# Patient Record
Sex: FEMALE | Race: ASIAN | NOT HISPANIC OR LATINO | Employment: UNEMPLOYED | URBAN - NONMETROPOLITAN AREA
[De-identification: names, ages, dates, MRNs, and addresses within clinical notes are randomized per-mention and may not be internally consistent; named-entity substitution may affect disease eponyms.]

---

## 2017-09-13 ENCOUNTER — TRANSCRIBE ORDERS (OUTPATIENT)
Dept: PHYSICAL THERAPY | Facility: HOSPITAL | Age: 52
End: 2017-09-13

## 2017-09-13 DIAGNOSIS — M54.42 LOW BACK PAIN WITH LEFT-SIDED SCIATICA, UNSPECIFIED BACK PAIN LATERALITY, UNSPECIFIED CHRONICITY: Primary | ICD-10-CM

## 2017-09-15 ENCOUNTER — HOSPITAL ENCOUNTER (OUTPATIENT)
Dept: PHYSICAL THERAPY | Facility: HOSPITAL | Age: 52
Setting detail: THERAPIES SERIES
Discharge: HOME | End: 2017-09-15

## 2017-09-15 DIAGNOSIS — M54.42 ACUTE BACK PAIN WITH SCIATICA, LEFT: Primary | ICD-10-CM

## 2017-09-15 PROCEDURE — 97110 THERAPEUTIC EXERCISES: CPT | Performed by: PHYSICAL THERAPIST

## 2017-09-15 PROCEDURE — 97161 PT EVAL LOW COMPLEX 20 MIN: CPT | Performed by: PHYSICAL THERAPIST

## 2017-09-19 ENCOUNTER — HOSPITAL ENCOUNTER (OUTPATIENT)
Dept: PHYSICAL THERAPY | Facility: HOSPITAL | Age: 52
Setting detail: THERAPIES SERIES
Discharge: HOME | End: 2017-09-19

## 2017-09-19 DIAGNOSIS — M54.42 ACUTE BACK PAIN WITH SCIATICA, LEFT: Primary | ICD-10-CM

## 2017-09-19 PROCEDURE — 97110 THERAPEUTIC EXERCISES: CPT

## 2017-09-19 NOTE — THERAPY TREATMENT NOTE
"    Outpatient Physical Therapy Ortho Treatment Note  [facility]  Janice Garcia PTA       Patient Name: Mallika Brewster  : 1965  MRN: [id number]  Today's Date: 2017      Visit Date: 2017     Visits: 2/2  Insurance Visits Approved: 20 visits  Recert Due: 10/06/2017  MD Appt: TBD Oct  Pain: pretreatment 4/10; post treatment \"just sore\"/10  Improvement: pt is subjectively reporting 0% improvement since initial evaluation    Visit Dx:    ICD-10-CM ICD-9-CM   1. Acute back pain with sciatica, left M54.42 724.3       There is no problem list on file for this patient.       No past medical history on file.     Past Surgical History:   Procedure Laterality Date   • CHOLECYSTECTOMY     • HYSTERECTOMY     • SHOULDER ROTATOR CUFF REPAIR Left              PT Ortho       17 0800    Subjective Comments    Subjective Comments states that she is doing well today. has a little pain in left buttocks area.   -    Precautions and Contraindications    Precautions/Limitations no known precautions/limitations  -    Subjective Pain    Able to rate subjective pain? yes  -    Pre-Treatment Pain Level 4  -    Post-Treatment Pain Level --   just sore  -      User Key  (r) = Recorded By, (t) = Taken By, (c) = Cosigned By    Initials Name Provider Type     Janice Garcia PTA Physical Therapy Assistant                            PT Assessment/Plan       17 0800       PT Assessment    Assessment Comments patient able to complete all therex with no increase in complaints. requires use of timer as she has difficulty keeping track of counting with therex today. timer method works well to keep patient on task.   -     PT Plan    PT Frequency 2x/week  -     PT Plan Comments review medical history for accuracy and completeness. next visit prone press ups, progress HEP with therex completed today.   -       User Key  (r) = Recorded By, (t) = Taken By, (c) = Cosigned By    Initials " Name Provider Type     Janice PATTERSON JANNIE Garcia Physical Therapy Assistant                Modalities       09/19/17 0800          Ice    Patient denies application of Ice Yes  -MH        User Key  (r) = Recorded By, (t) = Taken By, (c) = Cosigned By    Initials Name Provider Type    FAN PATTERSON JANNIE Garcia Physical Therapy Assistant                Exercises       09/19/17 0800          Subjective Comments    Subjective Comments states that she is doing well today. has a little pain in left buttocks area.   -MH      Subjective Pain    Able to rate subjective pain? yes  -MH      Pre-Treatment Pain Level 4  -MH      Post-Treatment Pain Level --   just sore  -MH      Exercise 1    Exercise Name 1 Pro II LE's  -MH      Time (Minutes) 1 10 mins  -MH      Additional Comments L 4.0  -MH      Exercise 2    Exercise Name 2 B St. HS S  -MH      Reps 2 2  -MH      Time (Seconds) 2 30 sec hold  -MH      Exercise 3    Exercise Name 3 B Seated Piriformis S  -MH      Reps 3 2  -MH      Time (Seconds) 3 30 sec hold  -MH      Exercise 4    Exercise Name 4 Sit to/from Stand with Lx Ext  -MH      Reps 4 20  -MH      Time (Seconds) 4 5 sec hold  -MH      Exercise 5    Exercise Name 5 Bridges  -MH      Reps 5 --  -MH      Time (Minutes) 5 4 minutes  -MH      Time (Seconds) 5 5 sec hold  -MH      Exercise 6    Exercise Name 6 Prone Alt Hip Ext  -MH      Time (Minutes) 6 4 minutes  -MH      Exercise 7    Exercise Name 7 Prone Hamcurls  -MH      Time (Minutes) 7 4 minutes  -MH      Exercise 8    Exercise Name 8 Prone TKE with glute sets  -MH      Time (Minutes) 8 4 minutes  -MH      Time (Seconds) 8 5 sec hold  -MH      Exercise 9    Exercise Name 9 ANIA  -MH      Time (Minutes) 9 5 minutes  -MH        User Key  (r) = Recorded By, (t) = Taken By, (c) = Cosigned By    Initials Name Provider Type     Janice PATTERSON JANNIE Garcia Physical Therapy Assistant                               PT OP Goals       09/19/17 0800       PT Short Term Goals    STG Date to  Achieve 09/29/17  -     STG 1 Pt I with HEP  -     STG 1 Progress Progressing  -     STG 2 Improve lumbar spine flex/ext AROM to WNL  -     STG 2 Progress Progressing  -     STG 3 Improve L LE MMT to 4/5  -     STG 3 Progress Progressing  -     STG 4 Reduce LBP/L buttocks pain to 3/10 with 75% reduction in L LE radicular symptoms  -     STG 4 Progress Progressing  -     Long Term Goals    LTG Date to Achieve 10/13/17  -     LTG 1 Improve L LE MMT to 4+/5  -     LTG 1 Progress Progressing  -     LTG 2 Reduce LBP/L buttocks pain to 1/10 with abolished 100% reduction in L LE radicular symptoms  -     LTG 2 Progress Progressing  -     LTG 3 Reduce Modified Oswestry score to 15 or less  -     LTG 3 Progress Ongoing  -     Time Calculation    PT Goal Re-Cert Due Date 10/06/17  -       User Key  (r) = Recorded By, (t) = Taken By, (c) = Cosigned By    Initials Name Provider Type     Janice Garcia PTA Physical Therapy Assistant                Therapy Education       09/19/17 0800          Therapy Education    Given HEP;Symptoms/condition management;Pain management;Posture/body mechanics  -      Program Reinforced  -      How Provided Demonstration;Verbal  -      Provided to Patient  -      Level of Understanding Verbalized;Demonstrated  -        User Key  (r) = Recorded By, (t) = Taken By, (c) = Cosigned By    Initials Name Provider Type     Janice Garcia PTA Physical Therapy Assistant                Time Calculation:   Start Time: 0800  Stop Time: 0853  Time Calculation (min): 53 min  Total Timed Code Minutes- PT: 53 minute(s)    Therapy Charges for Today     Code Description Service Date Service Provider Modifiers Qty    05643693238 HC PT THER PROC EA 15 MIN 9/19/2017 Janice Garcia PTA GP 4    13592980756 HC PT THER SUPP EA 15 MIN 9/19/2017 Janice Garcia PTA GP 1                    Janice Garcia PTA  9/19/2017

## 2017-09-22 ENCOUNTER — HOSPITAL ENCOUNTER (OUTPATIENT)
Dept: PHYSICAL THERAPY | Facility: HOSPITAL | Age: 52
Setting detail: THERAPIES SERIES
Discharge: HOME | End: 2017-09-22

## 2017-09-22 DIAGNOSIS — M54.42 ACUTE BACK PAIN WITH SCIATICA, LEFT: Primary | ICD-10-CM

## 2017-09-22 PROCEDURE — 97110 THERAPEUTIC EXERCISES: CPT

## 2017-09-22 NOTE — THERAPY TREATMENT NOTE
Outpatient Physical Therapy Ortho Treatment Note  [facility]  Janice Garcia PTA       Patient Name: Mallika Brewster  : 1965  MRN: [id number]  Today's Date: 2017      Visit Date: 2017     Visits: 3/3  Insurance Visits Approved: 20 visits  Recert Due: 10/06/2017  MD Appt: TBD Oct  Pain: pretreatment 4/10; post treatment 4/10  Improvement: pt is subjectively reporting 0% improvement since initial evaluation    Visit Dx:    ICD-10-CM ICD-9-CM   1. Acute back pain with sciatica, left M54.42 724.3       There is no problem list on file for this patient.       Past Medical History:   Diagnosis Date   • Acid reflux    • Arthritis    • Diabetes mellitus    • High cholesterol         Past Surgical History:   Procedure Laterality Date   • CHOLECYSTECTOMY     • HYSTERECTOMY     • SHOULDER ROTATOR CUFF REPAIR Left              PT Ortho       17 0800    Subjective Comments    Subjective Comments states that she is having some pain right now.   -    Precautions and Contraindications    Precautions/Limitations no known precautions/limitations  -    Subjective Pain    Able to rate subjective pain? yes  -    Pre-Treatment Pain Level 4  -      User Key  (r) = Recorded By, (t) = Taken By, (c) = Cosigned By    Initials Name Provider Type     Janice Garcia PTA Physical Therapy Assistant                            PT Assessment/Plan       17 08       PT Assessment    Assessment Comments patient has muscle cramping during prone exercises in Left LE  -     PT Plan    PT Frequency 2x/week  -     PT Plan Comments next prone press ups  -       User Key  (r) = Recorded By, (t) = Taken By, (c) = Cosigned By    Initials Name Provider Type     Janice Garcia PTA Physical Therapy Assistant                Modalities       17 0800          Ice    Patient denies application of Ice Yes  -        User Key  (r) = Recorded By, (t) = Taken By, (c) = Cosigned By     Initials Name Provider Type     Janice PATTERSON JANNIE Garcia Physical Therapy Assistant                Exercises       09/22/17 0800          Subjective Comments    Subjective Comments states that she is having some pain right now.   -MH      Subjective Pain    Able to rate subjective pain? yes  -MH      Pre-Treatment Pain Level 4  -MH      Post-Treatment Pain Level 4  -MH      Exercise 1    Exercise Name 1 Pro II LE's  -MH      Time (Minutes) 1 10 minutes  -MH      Additional Comments L 4.5  -MH      Exercise 2    Exercise Name 2 B St. HS S  -MH      Reps 2 2  -MH      Time (Seconds) 2 30 sec hold  -MH      Exercise 3    Exercise Name 3 B Seated Piriformis S  -MH      Reps 3 2  -MH      Time (Seconds) 3 30 sec hold  -MH      Exercise 4    Exercise Name 4 Sit to/from Stand with Lx Ext  -MH      Reps 4 --  -MH      Time (Minutes) 4 3 minutes  -MH      Time (Seconds) 4 5 sec hold  -MH      Exercise 5    Exercise Name 5 Bridges   -MH      Time (Minutes) 5 4 minutes  -MH      Time (Seconds) 5 5 sec hold  -MH      Exercise 6    Exercise Name 6 Prone Alt Hip Ext  -MH      Time (Minutes) 6 4 minutes  -MH      Exercise 7    Exercise Name 7 LTR  -MH      Time (Minutes) 7 2 minutes  -MH      Time (Seconds) 7 10 seconds  -MH      Exercise 8    Exercise Name 8 Prone TKE with glute sets  -MH      Time (Minutes) 8 3 minutes  -MH      Time (Seconds) 8 5 sec hold  -MH      Exercise 9    Exercise Name 9 Prone hamcurls  -MH      Time (Minutes) 9 3 minutes  -MH      Time (Seconds) 9 5 sec hold  -MH      Exercise 10    Exercise Name 10 ANIA  -MH      Time (Minutes) 10 5 minutes  -MH        User Key  (r) = Recorded By, (t) = Taken By, (c) = Cosigned By    Initials Name Provider Type     Janice PATTERSON JANNIE Garcia Physical Therapy Assistant                               PT OP Goals       09/22/17 0800       PT Short Term Goals    STG Date to Achieve 09/29/17  -MH     STG 1 Pt I with HEP  -MH     STG 1 Progress Met;Ongoing  -MH     STG 2 Improve lumbar  spine flex/ext AROM to WNL  -     STG 2 Progress Progressing  -     STG 3 Improve L LE MMT to 4/5  -     STG 3 Progress Progressing  -     STG 4 Reduce LBP/L buttocks pain to 3/10 with 75% reduction in L LE radicular symptoms  -     STG 4 Progress Progressing  -     Long Term Goals    LTG Date to Achieve 10/13/17  -     LTG 1 Improve L LE MMT to 4+/5  -     LTG 1 Progress Progressing  -     LTG 2 Reduce LBP/L buttocks pain to 1/10 with abolished 100% reduction in L LE radicular symptoms  -     LTG 2 Progress Progressing  -     LTG 3 Reduce Modified Oswestry score to 15 or less  -     LTG 3 Progress Ongoing  -     Time Calculation    PT Goal Re-Cert Due Date 10/06/17  -       User Key  (r) = Recorded By, (t) = Taken By, (c) = Cosigned By    Initials Name Provider Type     Janice Garcia PTA Physical Therapy Assistant                Therapy Education       09/22/17 0800          Therapy Education    Education Details all therex today issued for HEP  -      Given HEP;Symptoms/condition management;Pain management;Posture/body mechanics  -      Program Progressed  -      How Provided Verbal;Demonstration;Written  -      Provided to Patient  -      Level of Understanding Verbalized;Demonstrated;Teach back education performed  -        User Key  (r) = Recorded By, (t) = Taken By, (c) = Cosigned By    Initials Name Provider Type     Janice Garcia PTA Physical Therapy Assistant                Time Calculation:   Start Time: 0800  Stop Time: 0848  Time Calculation (min): 48 min  Total Timed Code Minutes- PT: 48 minute(s)    Therapy Charges for Today     Code Description Service Date Service Provider Modifiers Qty    55546835929 HC PT THER PROC EA 15 MIN 9/22/2017 Janice Garcia PTA GP 3    71191011286 HC PT THER SUPP EA 15 MIN 9/22/2017 Janice Garcia PTA GP 1                    Janice Garcia PTA  9/22/2017

## 2017-09-26 ENCOUNTER — HOSPITAL ENCOUNTER (OUTPATIENT)
Dept: PHYSICAL THERAPY | Facility: HOSPITAL | Age: 52
Setting detail: THERAPIES SERIES
Discharge: HOME | End: 2017-09-26

## 2017-09-26 DIAGNOSIS — M54.42 ACUTE BACK PAIN WITH SCIATICA, LEFT: Primary | ICD-10-CM

## 2017-09-26 PROCEDURE — 97110 THERAPEUTIC EXERCISES: CPT

## 2017-09-26 NOTE — THERAPY TREATMENT NOTE
Outpatient Physical Therapy Ortho Treatment Note  [facility]  Janice Garcia PTA       Patient Name: Mallika Brewster  : 1965  MRN: [id number]  Today's Date: 2017      Visit Date: 2017     Visits: 4/4  Insurance Visits Approved: 20 visits  Recert Due: 10/06/2017  MD Appt: Oct 27th  Pain: pretreatment 5/10; post treatment 5/10  Improvement: pt is subjectively reporting unsure% improvement since initial evaluation    Visit Dx:    ICD-10-CM ICD-9-CM   1. Acute back pain with sciatica, left M54.42 724.3       There is no problem list on file for this patient.       Past Medical History:   Diagnosis Date   • Acid reflux    • Arthritis    • Diabetes mellitus    • High cholesterol         Past Surgical History:   Procedure Laterality Date   • CHOLECYSTECTOMY     • HYSTERECTOMY     • SHOULDER ROTATOR CUFF REPAIR Left              PT Ortho       17 08    Precautions and Contraindications    Precautions/Limitations no known precautions/limitations  -    Subjective Pain    Post-Treatment Pain Level 5  -    Special Tests/Palpation    Special Tests/Palpation --   TTP Left Piriformis, with muscle tension noted  -      User Key  (r) = Recorded By, (t) = Taken By, (c) = Cosigned By    Initials Name Provider Type     Janice Garcia PTA Physical Therapy Assistant                            PT Assessment/Plan       17 08       PT Assessment    Assessment Comments patient has muscle tension noted at left piriformis. TTP significant along piriformis. cues for appropriate trans ab contraction to decrease symptoms of soreness in lumbar region.   -     PT Plan    PT Frequency 2x/week  -     PT Plan Comments next initiate prone press ups  -       User Key  (r) = Recorded By, (t) = Taken By, (c) = Cosigned By    Initials Name Provider Type     Janice Garcia PTA Physical Therapy Assistant                    Exercises       17 08          Subjective Comments     Subjective Comments pain has been a throbbing pain in the back. still radiating down the legs  -      Subjective Pain    Able to rate subjective pain? yes  -MH      Pre-Treatment Pain Level 5  -MH      Post-Treatment Pain Level 5  -MH      Exercise 1    Exercise Name 1 Pro II LE's   -MH      Time (Minutes) 1 10 minutes  -MH      Exercise 2    Exercise Name 2 B St. HS S  -MH      Reps 2 2  -MH      Time (Seconds) 2 30 sec hold  -MH      Exercise 3    Exercise Name 3 B Seated Piriformis S  -MH      Reps 3 2  -MH      Time (Seconds) 3 30 sec hold  -MH      Exercise 4    Exercise Name 4 PPT with trans abs  -MH      Reps 4 20  -MH      Exercise 5    Exercise Name 5 Bridges  -MH      Time (Minutes) 5 3 minutes  -MH      Time (Seconds) 5 5 sec hold  -MH      Additional Comments focus on technique to make sure to activate trans abs   -MH      Exercise 6    Exercise Name 6 LTR  -MH      Time (Minutes) 6 2 minutes  -MH      Time (Seconds) 6 10 sec hold  -MH      Exercise 7    Exercise Name 7 Prone Hamcurls  -MH      Time (Minutes) 7 3 minutes  -MH      Exercise 8    Exercise Name 8 ANIA  -MH      Time (Minutes) 8 5 minutes  -MH      Exercise 9    Exercise Name 9 prone alt Hip Ext  -MH      Time (Minutes) 9 3 minutes  -MH        User Key  (r) = Recorded By, (t) = Taken By, (c) = Cosigned By    Initials Name Provider Type     Janice Garcia PTA Physical Therapy Assistant                        Manual Rx (last 36 hours)      Manual Treatments       09/26/17 0900          Manual Rx 1    Manual Rx 1 Location STM and Trigger Point release at left piriformis  -      Manual Rx 1 Duration 5 minutes   -        User Key  (r) = Recorded By, (t) = Taken By, (c) = Cosigned By    Initials Name Provider Type     Janice Garcia PTA Physical Therapy Assistant                PT OP Goals       09/26/17 0800       PT Short Term Goals    STG Date to Achieve 09/29/17  -     STG 1 Pt I with HEP  -     STG 1 Progress Met;Ongoing  -      STG 2 Improve lumbar spine flex/ext AROM to WNL  -     STG 2 Progress Progressing  -     STG 3 Improve L LE MMT to 4/5  -MH     STG 3 Progress Progressing  -     STG 4 Reduce LBP/L buttocks pain to 3/10 with 75% reduction in L LE radicular symptoms  -     STG 4 Progress Ongoing  -     Long Term Goals    LTG Date to Achieve 10/13/17  -     LTG 1 Improve L LE MMT to 4+/5  -     LTG 1 Progress Progressing  -     LTG 2 Reduce LBP/L buttocks pain to 1/10 with abolished 100% reduction in L LE radicular symptoms  -     LTG 2 Progress Progressing  -     LTG 3 Reduce Modified Oswestry score to 15 or less  -     LTG 3 Progress Ongoing  -     Time Calculation    PT Goal Re-Cert Due Date 10/06/17  -       User Key  (r) = Recorded By, (t) = Taken By, (c) = Cosigned By    Initials Name Provider Type     Janice Garcia PTA Physical Therapy Assistant                Therapy Education       09/26/17 0800          Therapy Education    Given HEP;Symptoms/condition management;Pain management;Posture/body mechanics  -      Program Reinforced  -      How Provided Verbal;Demonstration  -      Provided to Patient  -      Level of Understanding Verbalized;Demonstrated  -        User Key  (r) = Recorded By, (t) = Taken By, (c) = Cosigned By    Initials Name Provider Type     Janice Garcia PTA Physical Therapy Assistant                Time Calculation:   Start Time: 0800  Stop Time: 0847  Time Calculation (min): 47 min  Total Timed Code Minutes- PT: 47 minute(s)    Therapy Charges for Today     Code Description Service Date Service Provider Modifiers Qty    34169573287 HC PT THER PROC EA 15 MIN 9/26/2017 Janice Garcia PTA GP 3    87821144694 HC PT THER SUPP EA 15 MIN 9/26/2017 Janice Garcia PTA GP 1                    Janice Garcia PTA  9/26/2017

## 2017-09-29 ENCOUNTER — HOSPITAL ENCOUNTER (OUTPATIENT)
Dept: PHYSICAL THERAPY | Facility: HOSPITAL | Age: 52
Setting detail: THERAPIES SERIES
Discharge: HOME | End: 2017-09-29

## 2017-09-29 DIAGNOSIS — M54.42 ACUTE BACK PAIN WITH SCIATICA, LEFT: Primary | ICD-10-CM

## 2017-09-29 PROCEDURE — 97110 THERAPEUTIC EXERCISES: CPT

## 2017-09-29 NOTE — THERAPY TREATMENT NOTE
Outpatient Physical Therapy Ortho Treatment Note  [facility]  Radha Galicia ATC       Patient Name: Mallika Brewster  : 1965  MRN: [id number]  Today's Date: 2017      Visit Date: 2017   Pt reports 4/10 pain pre treatment, 3/10 pain post treatment  Reportsunsure % of improvement.  Attended 5/5 visits.  Insurance available: 20 visits   Next MD appt: oct/.  Recertification: 10/06/2017.    Visit Dx:    ICD-10-CM ICD-9-CM   1. Acute back pain with sciatica, left M54.42 724.3       There is no problem list on file for this patient.       Past Medical History:   Diagnosis Date   • Acid reflux    • Arthritis    • Diabetes mellitus    • High cholesterol         Past Surgical History:   Procedure Laterality Date   • CHOLECYSTECTOMY     • HYSTERECTOMY     • SHOULDER ROTATOR CUFF REPAIR Left              PT Ortho       17 0800    Subjective Comments    Subjective Comments (P)  states her pain comes and goes the last few days but currently it is about a 4/10. states she joann very sore after last visit but now she feels better once she was able to work off the sorness  -HB    Precautions and Contraindications    Precautions/Limitations (P)  no known precautions/limitations  -HB    Subjective Pain    Able to rate subjective pain? (P)  yes  -HB    Pre-Treatment Pain Level (P)  4  -HB      User Key  (r) = Recorded By, (t) = Taken By, (c) = Cosigned By    Initials Name Provider Type    NEVAEH Galicia ATC                             PT Assessment/Plan       17 0900       PT Assessment    Assessment Comments (P)  tolerated therex well, demod good technique with press ups   -HB     PT Plan    PT Frequency (P)  2x/week  -HB     PT Plan Comments (P)  next visit address possible opp hip step ups   -HB       User Key  (r) = Recorded By, (t) = Taken By, (c) = Cosigned By    Initials Name Provider Type    NEVAEH Galicia ATC                  Modalities       09/29/17 0800          Ice    Patient denies application of Ice (P)  Yes  -HB        User Key  (r) = Recorded By, (t) = Taken By, (c) = Cosigned By    Initials Name Provider Type    HB Radha Galicia, ATC                 Exercises       09/29/17 0800          Subjective Comments    Subjective Comments (P)  states her pain comes and goes the last few days but currently it is about a 4/10. states she joann very sore after last visit but now she feels better once she was able to work off the sorness  -HB      Subjective Pain    Able to rate subjective pain? (P)  yes  -HB      Pre-Treatment Pain Level (P)  4  -HB      Exercise 1    Exercise Name 1 (P)  Pro II LE's   -HB      Time (Minutes) 1 (P)  10 minutes  -HB      Additional Comments (P)  L4.5  -HB      Exercise 2    Exercise Name 2 (P)  B St. HS S  -HB      Reps 2 (P)  2  -HB      Time (Seconds) 2 (P)  30 sec hold  -HB      Exercise 3    Exercise Name 3 (P)  B Seated Piriformis S  -HB      Reps 3 (P)  2  -HB      Time (Seconds) 3 (P)  30 sec hold  -HB      Exercise 4    Exercise Name 4 (P)  LTR  -HB      Time (Minutes) 4 (P)  2 minutes  -HB      Time (Seconds) 4 (P)  10 seconds  -HB      Exercise 5    Exercise Name 5 (P)  Bridges   -HB      Time (Minutes) 5 (P)  3 minutes  -HB      Time (Seconds) 5 (P)  5 sec hold  -HB      Additional Comments (P)  Focus on technique   -HB      Exercise 6    Exercise Name 6 (P)  Prone ham curls   -HB      Time (Minutes) 6 (P)  2 minutes  -HB      Time (Seconds) 6 (P)  10 sec hold   -HB      Exercise 7    Exercise Name 7 (P)  Prone press ups   -HB      Time (Minutes) 7 (P)  3 minutes  -HB      Time (Seconds) 7 (P)  3 sec hold   -HB      Exercise 8    Exercise Name 8 (P)  Prone TKE w/ GS  -HB      Time (Minutes) 8 (P)  3 minutes   -HB      Time (Seconds) 8 (P)  5 sec hold   -HB      Exercise 9    Exercise Name 9 (P)  Prone alt hip   -HB      Time (Minutes) 9 (P)  3 minutes  -HB      Exercise 10     Exercise Name 10 (P)  ANIA  -HB      Time (Minutes) 10 (P)  5 minutes  -HB        User Key  (r) = Recorded By, (t) = Taken By, (c) = Cosigned By    Initials Name Provider Type    HB Radha Galicia, ATC                                PT OP Goals       09/29/17 0800       PT Short Term Goals    STG Date to Achieve (P)  09/29/17  -HB     STG 1 (P)  Pt I with HEP  -HB     STG 1 Progress (P)  Met;Ongoing  -HB     STG 2 (P)  Improve lumbar spine flex/ext AROM to WNL  -HB     STG 2 Progress (P)  Progressing  -HB     STG 3 (P)  Improve L LE MMT to 4/5  -HB     STG 3 Progress (P)  Progressing  -HB     STG 4 (P)  Reduce LBP/L buttocks pain to 3/10 with 75% reduction in L LE radicular symptoms  -HB     STG 4 Progress (P)  Ongoing  -HB     Long Term Goals    LTG Date to Achieve (P)  10/13/17  -HB     LTG 1 (P)  Improve L LE MMT to 4+/5  -HB     LTG 1 Progress (P)  Progressing  -HB     LTG 2 (P)  Reduce LBP/L buttocks pain to 1/10 with abolished 100% reduction in L LE radicular symptoms  -HB     LTG 2 Progress (P)  Progressing  -HB     LTG 3 (P)  Reduce Modified Oswestry score to 15 or less  -HB     LTG 3 Progress (P)  Ongoing  -HB     Time Calculation    PT Goal Re-Cert Due Date (P)  10/06/17  -HB       User Key  (r) = Recorded By, (t) = Taken By, (c) = Cosigned By    Initials Name Provider Type    HB Radha Galicia, ATC                     Time Calculation:   Start Time: (P) 0803  Stop Time: (P) 0848  Time Calculation (min): (P) 45 min  Total Timed Code Minutes- PT: (P) 45 minute(s)    Therapy Charges for Today     Code Description Service Date Service Provider Modifiers Qty    55470800019 HC PT THER PROC EA 15 MIN 9/29/2017 Radha A Grayson, ATC  3                    Radha Galicia, ATC  9/29/2017

## 2017-10-03 ENCOUNTER — HOSPITAL ENCOUNTER (OUTPATIENT)
Dept: PHYSICAL THERAPY | Facility: HOSPITAL | Age: 52
Setting detail: THERAPIES SERIES
Discharge: HOME | End: 2017-10-03

## 2017-10-03 DIAGNOSIS — M54.42 ACUTE BACK PAIN WITH SCIATICA, LEFT: Primary | ICD-10-CM

## 2017-10-03 PROCEDURE — 97110 THERAPEUTIC EXERCISES: CPT | Performed by: PHYSICAL THERAPIST

## 2017-10-03 NOTE — THERAPY PROGRESS REPORT/RE-CERT
Outpatient Physical Therapy Ortho Progress Note  [facility]     Patient Name: Mallika Brewster  : 1965  MRN: [id number]  Today's Date: 10/3/2017      Visit Date: 10/03/2017  Pt reports 4/10 pain pre treatment, 1/10 pain post treatment  Reports  % of improvement.  Attended 6/6 visits.  Insurance available: 20 visits   Next MD appt: Oct/.  Recertification: 10/24/2017.  There is no problem list on file for this patient.       Past Medical History:   Diagnosis Date   • Acid reflux    • Arthritis    • Diabetes mellitus    • High cholesterol         Past Surgical History:   Procedure Laterality Date   • CHOLECYSTECTOMY     • HYSTERECTOMY     • SHOULDER ROTATOR CUFF REPAIR Left        Visit Dx:     ICD-10-CM ICD-9-CM   1. Acute back pain with sciatica, left M54.42 724.3                 PT Ortho       10/03/17 0800    Subjective Comments    Subjective Comments pt reports soreness mostly in the lower lumbar spine and the left SI region, overall 10% improvement overall  -BS    Precautions and Contraindications    Precautions/Limitations no known precautions/limitations  -BS    Subjective Pain    Able to rate subjective pain? yes  -BS    Pre-Treatment Pain Level 4  -BS    Post-Treatment Pain Level 1  -BS    Myotomal Screen- Lower Quarter Clearing    Hip flexion (L2) Bilateral:;4+ (Good +)  -BS    Knee extension (L3) Bilateral:;4+ (Good +)  -BS    Lumbar ROM Screen- Lower Quarter Clearing    Lumbar Flexion Impaired   50% mod limit  -BS    Lumbar Extension Impaired   min limit 75%  -BS      User Key  (r) = Recorded By, (t) = Taken By, (c) = Cosigned By    Initials Name Provider Type    JORDAN Soto, PT Physical Therapist                            Therapy Education       10/03/17 1800          Therapy Education    Given HEP;Symptoms/condition management;Pain management;Posture/body mechanics  -BS      Program Reinforced  -BS      How Provided Verbal;Demonstration  -BS      Provided to  Patient  -BS      Level of Understanding Verbalized;Demonstrated  -BS        User Key  (r) = Recorded By, (t) = Taken By, (c) = Cosigned By    Initials Name Provider Type    JORDAN Soto, PT Physical Therapist                PT OP Goals       10/03/17 0900 10/03/17 0800    PT Short Term Goals    STG Date to Achieve 10/17/17  -BS     STG 1 Pt I with HEP  -BS     STG 1 Progress Ongoing  -BS     STG 2 Improve lumbar spine flex/ext AROM to WNL  -BS     STG 2 Progress Progressing  -BS     STG 3 Improve L LE MMT to 4/5  -BS     STG 3 Progress Met  -BS     STG 4 Reduce LBP/L buttocks pain to 3/10 with 75% reduction in L LE radicular symptoms  -BS     STG 4 Progress Progressing  -BS     Long Term Goals    LTG Date to Achieve 10/31/17  -BS     LTG 1 Improve L LE MMT to 4+/5  -BS     LTG 1 Progress Progressing  -BS     LTG 2 Reduce LBP/L buttocks pain to 1/10 with abolished 100% reduction in L LE radicular symptoms  -BS     LTG 2 Progress Progressing  -BS     LTG 3 Reduce Modified Oswestry score to 15 or less  -BS     LTG 3 Progress Met  -BS     Time Calculation    PT Goal Re-Cert Due Date --  -BS 10/24/17  -BS      User Key  (r) = Recorded By, (t) = Taken By, (c) = Cosigned By    Initials Name Provider Type    JORDAN Soto, PT Physical Therapist                PT Assessment/Plan       10/03/17 0900       PT Assessment    Functional Limitations Impaired gait;Limitation in home management;Limitations in community activities;Performance in leisure activities;Performance in work activities;Performance in sport activities;Performance in self-care ADL  -BS     Assessment Comments pt progressing slowly overall toward all goals, however, had dramatic reduction in L LE sciatica pain today with unloaded ANIA and SGIS to the R, coupled with JOSUE, avoid aggressive core trunk stability until L LE radicular symptoms centralize.  -BS     Please refer to paper survey for additional self-reported information Yes  -BS     Rehab  Potential Good  -BS     Patient/caregiver participated in establishment of treatment plan and goals Yes  -BS     Patient would benefit from skilled therapy intervention Yes  -BS     PT Plan    PT Frequency 2x/week  -BS     Predicted Duration of Therapy Intervention (days/wks) 4 weeks  -BS     Planned CPT's? PT EVAL LOW COMPLEXITY: 16892;PT RE-EVAL: 51149;PT THER PROC EA 15 MIN: 46377;PT MANUAL THERAPY EA 15 MIN: 06216;PT THER ACT EA 15 MIN: 32837;PT ELECTRICAL STIM UNATTEND: ;PT ULTRASOUND EA 15 MIN: 44207;PT HOT/COLD PACK WC NONMCARE: 43378;PT THER SUPP EA 15 MIN  -BS     Physical Therapy Interventions (Optional Details) home exercise program;joint mobilization;lumbar stabilization;manual therapy techniques;modalities;patient/family education;postural re-education;ROM (Range of Motion);strengthening;stretching  -BS     PT Plan Comments avoid core stability until sciatica symptoms resolve, address prone progression and revisit standing back bends with side glides in standing.  -BS       User Key  (r) = Recorded By, (t) = Taken By, (c) = Cosigned By    Initials Name Provider Type    BS Herminio Soto, PT Physical Therapist                  Exercises       10/03/17 0800          Subjective Comments    Subjective Comments pt reports soreness mostly in the lower lumbar spine and the left SI region, overall 10% improvement overall  -BS      Subjective Pain    Able to rate subjective pain? yes  -BS      Pre-Treatment Pain Level 4  -BS      Post-Treatment Pain Level 1  -BS      Exercise 1    Exercise Name 1 Pro II LE's   -BS      Time (Minutes) 1 10 minutes  -BS      Additional Comments L4  -BS      Exercise 2    Exercise Name 2 JOSUE  -BS      Sets 2 3  -BS      Reps 2 10  -BS      Exercise 3    Exercise Name 3 SGIS to R  -BS      Sets 3 2  -BS      Reps 3 10  -BS      Exercise 4    Exercise Name 4 ANIA  -BS      Time (Minutes) 4 5', 2'  -BS      Exercise 5    Exercise Name 5 PPU  -BS      Sets 5 1  -BS      Reps 5 5   -BS      Time (Seconds) 5 increased L sciatica  -BS      Exercise 6    Exercise Name 6 L SLR  -BS      Sets 6 1  -BS      Reps 6 5  -BS      Time (Minutes) 6 d/c'd-increased L sciatica pain  -BS        User Key  (r) = Recorded By, (t) = Taken By, (c) = Cosigned By    Initials Name Provider Type    JORDAN Soto, PT Physical Therapist                              Outcome Measures       10/03/17 0800          Modified Oswestry    Modified Oswestry Score/Comments 11-32%  -BS      Functional Assessment    Outcome Measure Options Modifed Owestry  -BS        User Key  (r) = Recorded By, (t) = Taken By, (c) = Cosigned By    Initials Name Provider Type    JORDAN Soto, PT Physical Therapist            Time Calculation:   Start Time: 0803  Stop Time: 0845  Time Calculation (min): 42 min     Therapy Charges for Today     Code Description Service Date Service Provider Modifiers Qty    98288178981 HC PT THER PROC EA 15 MIN 10/3/2017 Herminio Soto, PT GP 3          PT G-Codes  Outcome Measure Options: Modifed Owestry         Herminio Soto, PT  10/3/2017

## 2017-10-06 ENCOUNTER — APPOINTMENT (OUTPATIENT)
Dept: PHYSICAL THERAPY | Facility: HOSPITAL | Age: 52
End: 2017-10-06

## 2017-10-10 ENCOUNTER — APPOINTMENT (OUTPATIENT)
Dept: PHYSICAL THERAPY | Facility: HOSPITAL | Age: 52
End: 2017-10-10

## 2017-10-13 ENCOUNTER — HOSPITAL ENCOUNTER (OUTPATIENT)
Dept: PHYSICAL THERAPY | Facility: HOSPITAL | Age: 52
Setting detail: THERAPIES SERIES
Discharge: HOME | End: 2017-10-13

## 2017-10-13 DIAGNOSIS — M54.42 ACUTE BACK PAIN WITH SCIATICA, LEFT: Primary | ICD-10-CM

## 2017-10-13 PROCEDURE — 97110 THERAPEUTIC EXERCISES: CPT

## 2017-10-13 NOTE — THERAPY TREATMENT NOTE
"    Outpatient Physical Therapy Ortho Treatment Note  [facility]  Janice Garcia PTA       Patient Name: Mallika Brewster  : 1965  MRN: [id number]  Today's Date: 10/13/2017      Visit Date: 10/13/2017     Visits: 7/9  Insurance Visits Approved: 20 visits  Recert Due: 10/24/2017  MD Appt: 10/27/2017  Pain: pretreatment 6/10; post treatment \"numb\"/10  Improvement: pt is subjectively reporting \"its better\"% improvement since initial evaluation    Visit Dx:    ICD-10-CM ICD-9-CM   1. Acute back pain with sciatica, left M54.42 724.3       There is no problem list on file for this patient.       Past Medical History:   Diagnosis Date   • Acid reflux    • Arthritis    • Diabetes mellitus    • High cholesterol         Past Surgical History:   Procedure Laterality Date   • CHOLECYSTECTOMY     • HYSTERECTOMY     • SHOULDER ROTATOR CUFF REPAIR Left              PT Ortho       10/13/17 0800    Precautions and Contraindications    Precautions/Limitations no known precautions/limitations  -    Subjective Pain    Able to rate subjective pain? yes  -    Pre-Treatment Pain Level 6  -    Post-Treatment Pain Level --   numb  -      User Key  (r) = Recorded By, (t) = Taken By, (c) = Cosigned By    Initials Name Provider Type     Janice Garcia PTA Physical Therapy Assistant                            PT Assessment/Plan       10/13/17 0800       PT Assessment    Assessment Comments patient has significant tenderness to palpation at left greater trochanter. displays tight ITB on left side as well. increased pain at left hip/greater trochanter with ABD therex and resisted hip therex. does not appear to have radicular symptoms today after discussion of symptoms with patient. symptoms are more specific tender point tenderness at greater trochanter. used ice to assist wtih tenderness.    -     PT Plan    PT Frequency 2x/week  -     PT Plan Comments continue ITB stretches, instruct in sidelying ITB " stretching. continue with glute strengthening   -       User Key  (r) = Recorded By, (t) = Taken By, (c) = Cosigned By    Initials Name Provider Type     Janice PATTERSON JANNIE Garcia Physical Therapy Assistant                Modalities       10/13/17 0800          Ice    Ice Applied Yes  -      Location left hip  -MH      Rx Minutes --   20 minutes  -      Ice S/P Rx Yes  -      Patient reports will apply ice at home to involved area Yes  -        User Key  (r) = Recorded By, (t) = Taken By, (c) = Cosigned By    Initials Name Provider Type     Janice PATTERSON RodotoriJANNIE Physical Therapy Assistant                Exercises       10/13/17 0800          Subjective Comments    Subjective Comments patient reports tenderness along left greater trochanter, states that its tender to touch. reports that she does still have some low back pain but it feels different than whats going on at the left hip. states that she doesn't have radicular symptoms every day now, reports that it comes and goes but not every day.   -      Subjective Pain    Able to rate subjective pain? yes  -      Pre-Treatment Pain Level 6  -MH      Post-Treatment Pain Level --   numb  -      Exercise 1    Exercise Name 1 Pro II LE's   -      Time (Minutes) 1 10 minutes  -      Additional Comments L 5.0  -      Exercise 2    Exercise Name 2 B seated piriformis S  -      Reps 2 2  -      Time (Seconds) 2 30 sec hold  -      Exercise 3    Exercise Name 3 sit to/from stand with Lx Ext  -      Reps 3 20  -      Time (Seconds) 3 5 sec hold  -      Exercise 4    Exercise Name 4 Right Sidelying-  clamshells  -      Reps 4 15  -MH      Additional Comments painful  -      Exercise 5    Exercise Name 5 ANIA  -      Time (Minutes) 5 5 minutes   -      Exercise 6    Exercise Name 6 Prone TKE with glute sets  -      Reps 6 20  -      Time (Seconds) 6 5 sec hold  -      Exercise 7    Exercise Name 7 prone alt hip extension  -      Reps 7  20  -MH      Exercise 8    Exercise Name 8 seated glute squeezes  -      Reps 8 30  -MH      Exercise 9    Exercise Name 9 right sidelying hip ABD with extension  -      Reps 9 5  -MH      Additional Comments painful  -        User Key  (r) = Recorded By, (t) = Taken By, (c) = Cosigned By    Initials Name Provider Type     Janice Garcia PTA Physical Therapy Assistant                               PT OP Goals       10/13/17 0800       PT Short Term Goals    STG Date to Achieve 10/17/17  -     STG 1 Pt I with HEP  -     STG 1 Progress Met  -     STG 2 Improve lumbar spine flex/ext AROM to WNL  -     STG 2 Progress Progressing  -     STG 3 Improve L LE MMT to 4/5  -     STG 3 Progress Met  -     STG 4 Reduce LBP/L buttocks pain to 3/10 with 75% reduction in L LE radicular symptoms  -     STG 4 Progress Partially Met  -     Long Term Goals    LTG Date to Achieve 10/31/17  -     LTG 1 Improve L LE MMT to 4+/5  -     LTG 1 Progress Progressing  -     LTG 2 Reduce LBP/L buttocks pain to 1/10 with abolished 100% reduction in L LE radicular symptoms  -     LTG 2 Progress Progressing  -     LTG 3 Reduce Modified Oswestry score to 15 or less  -     LTG 3 Progress Met  -     Time Calculation    PT Goal Re-Cert Due Date 10/24/17  -       User Key  (r) = Recorded By, (t) = Taken By, (c) = Cosigned By    Initials Name Provider Type     Janice Garcia PTA Physical Therapy Assistant                Therapy Education       10/13/17 0800          Therapy Education    Education Details instructed in use of ice  -      Given HEP;Symptoms/condition management;Pain management;Posture/body mechanics  -      Program Reinforced  -      How Provided Verbal;Demonstration  -      Provided to Patient  -      Level of Understanding Verbalized;Demonstrated;Teach back education performed  -        User Key  (r) = Recorded By, (t) = Taken By, (c) = Cosigned By    Initials Name Provider Type      Janice Garcia PTA Physical Therapy Assistant                Time Calculation:   Start Time: 0800  Stop Time: 0905  Time Calculation (min): 65 min  PT Non-Billable Time (min): 20 min  Total Timed Code Minutes- PT: 45 minute(s)    Therapy Charges for Today     Code Description Service Date Service Provider Modifiers Qty    06495013114 HC PT THER PROC EA 15 MIN 10/13/2017 Janice Garcia PTA GP 3    35632614151 HC PT THER SUPP EA 15 MIN 10/13/2017 Janice Garcia PTA GP 1                    Janice Garcia PTA  10/13/2017

## 2017-10-17 ENCOUNTER — HOSPITAL ENCOUNTER (OUTPATIENT)
Dept: PHYSICAL THERAPY | Facility: HOSPITAL | Age: 52
Setting detail: THERAPIES SERIES
Discharge: HOME | End: 2017-10-17

## 2017-10-17 DIAGNOSIS — M54.42 ACUTE BACK PAIN WITH SCIATICA, LEFT: Primary | ICD-10-CM

## 2017-10-17 PROCEDURE — 97110 THERAPEUTIC EXERCISES: CPT | Performed by: PHYSICAL THERAPIST

## 2017-10-17 NOTE — THERAPY TREATMENT NOTE
Outpatient Physical Therapy Ortho Treatment Note  [facility]     Patient Name: Mallika Brewster  : 1965  MRN: [id number]  Today's Date: 10/17/2017      Visit Date: 10/17/2017     Pt attended 8/8 scheduled visits.   Re-cert date 10/24/17  Pt will RTD 10/27/17    Visit Dx:    ICD-10-CM ICD-9-CM   1. Acute back pain with sciatica, left M54.42 724.3       There is no problem list on file for this patient.       Past Medical History:   Diagnosis Date   • Acid reflux    • Arthritis    • Diabetes mellitus    • High cholesterol         Past Surgical History:   Procedure Laterality Date   • CHOLECYSTECTOMY     • HYSTERECTOMY     • SHOULDER ROTATOR CUFF REPAIR Left              PT Ortho       10/17/17 0800    Subjective Comments    Subjective Comments States the hip felt better after the ice last visit. States deondre lateral hip area is still sore.   -LF    Subjective Pain    Able to rate subjective pain? yes  -LF    Pre-Treatment Pain Level 4  -LF      User Key  (r) = Recorded By, (t) = Taken By, (c) = Cosigned By    Initials Name Provider Type    TERRELL Rocha PT Physical Therapist                            PT Assessment/Plan       10/17/17 0800       PT Assessment    Assessment Comments Nice performance of exercises, no c/o increased pain , during session unless stands on L LE too long (stretching R) . States has been usig ice at home since last visit.   -LF     PT Plan    PT Plan Comments Continue therapy, progressing glute strengthening, hip stabilization training, progress HEP .   -LF       User Key  (r) = Recorded By, (t) = Taken By, (c) = Cosigned By    Initials Name Provider Type    TERRELL Rocha PT Physical Therapist                Modalities       10/17/17 0800          Ice    Ice Applied Yes  -LF      Location L hip   -LF      Rx Minutes 10 mins  -LF        User Key  (r) = Recorded By, (t) = Taken By, (c) = Cosigned By    Initials Name Provider Type    TERRELL Rocha PT  Physical Therapist                Exercises       10/17/17 0800 10/17/17 0700       Subjective Comments    Subjective Comments States the hip felt better after the ice last visit. States deondre lateral hip area is still sore.   -LF      Subjective Pain    Able to rate subjective pain? yes  -LF      Pre-Treatment Pain Level 4  -LF      Exercise 1    Exercise Name 1  Pro II  -LF     Time (Minutes) 1  10  -LF     Exercise 2    Exercise Name 2  sit<> stand JOSUE  -LF     Reps 2  10  -LF     Exercise 3    Exercise Name 3  piriformis stretch  -LF     Reps 3  10  -LF     Exercise 4    Exercise Name 4  standing HS stretch  -LF     Reps 4  4  -LF     Exercise 5    Exercise Name 5  prone hip ext, alternating, pillow under belly (to start in flexion)  -LF     Sets 5  2  -LF     Reps 5  10  -LF     Exercise 6    Exercise Name 6  bridging  -LF     Sets 6  2  -LF     Reps 6  10  -LF       User Key  (r) = Recorded By, (t) = Taken By, (c) = Cosigned By    Initials Name Provider Type    LF Elsa Rocha, PT Physical Therapist                               PT OP Goals       10/17/17 0700       PT Short Term Goals    STG Date to Achieve 10/17/17  -LF     STG 1 Pt I with HEP each session  -LF     STG 1 Progress Ongoing  -LF     STG 2 Improve lumbar spine flex/ext AROM to WNL  -LF     STG 2 Progress Progressing  -LF     STG 3 Decrease c/o hip pain to 1-2/10  -LF     STG 3 Progress Progressing  -LF     STG 4 Reduce LBP/L buttocks pain to 3/10 with 75% reduction in L LE radicular symptoms  -LF     STG 4 Progress Partially Met  -LF     Long Term Goals    LTG Date to Achieve 10/31/17  -LF     LTG 1 Improve L LE MMT to 4+/5  -LF     LTG 1 Progress Progressing  -LF     LTG 2 Reduce LBP/L buttocks pain to 1/10 with abolished 100% reduction in L LE radicular symptoms  -LF     LTG 2 Progress Progressing  -LF     LTG 3 Reduce Modified Oswestry score to 10 or less  -LF     LTG 3 Progress Progressing  -LF     Time Calculation    PT Goal Re-Cert Due  Date 10/24/17  -LF       User Key  (r) = Recorded By, (t) = Taken By, (c) = Cosigned By    Initials Name Provider Type    TERRELL Rocha PT Physical Therapist                Therapy Education       10/17/17 0800          Therapy Education    Given HEP;Pain management  -LF      Program Reinforced  -LF      How Provided Verbal  -LF      Provided to Patient  -LF      Level of Understanding Verbalized  -LF        User Key  (r) = Recorded By, (t) = Taken By, (c) = Cosigned By    Initials Name Provider Type    TERRELL Rocha PT Physical Therapist                Time Calculation:   Start Time: 0750  Stop Time: 0830  Time Calculation (min): 40 min  Total Timed Code Minutes- PT: 40 minute(s)    Therapy Charges for Today     Code Description Service Date Service Provider Modifiers Qty    04535388144 HC PT THER PROC EA 15 MIN 10/17/2017 Elsa Rocha, PT GP 2    60260619041 HC PT THER SUPP EA 15 MIN 10/17/2017 Elsa Rocha, PT GP 1                    Elsa Rocha, PT  10/17/2017

## 2017-10-19 ENCOUNTER — APPOINTMENT (OUTPATIENT)
Dept: PHYSICAL THERAPY | Facility: HOSPITAL | Age: 52
End: 2017-10-19

## 2017-10-24 ENCOUNTER — HOSPITAL ENCOUNTER (OUTPATIENT)
Dept: PHYSICAL THERAPY | Facility: HOSPITAL | Age: 52
Setting detail: THERAPIES SERIES
Discharge: HOME | End: 2017-10-24

## 2017-10-24 DIAGNOSIS — M54.42 ACUTE BACK PAIN WITH SCIATICA, LEFT: Primary | ICD-10-CM

## 2017-10-24 PROCEDURE — 97110 THERAPEUTIC EXERCISES: CPT | Performed by: PHYSICAL THERAPIST

## 2017-10-24 NOTE — THERAPY TREATMENT NOTE
Outpatient Physical Therapy Ortho Re-Assessment  [facility]     Patient Name: Mallika Brewster  : 1965  MRN: [id number]  Today's Date: 10/24/2017      Visit Date: 10/24/2017     Pt attended  scheduled visits. .  Re-cert date 17  Pt will RTD 10/27/17       There is no problem list on file for this patient.       Past Medical History:   Diagnosis Date   • Acid reflux    • Arthritis    • Back pain    • Bronchitis    • Diabetes mellitus    • High cholesterol    • Left hip pain         Past Surgical History:   Procedure Laterality Date   • CHOLECYSTECTOMY     • HYSTERECTOMY     • SHOULDER ROTATOR CUFF REPAIR Left        Visit Dx:     ICD-10-CM ICD-9-CM   1. Acute back pain with sciatica, left M54.42 724.3     Current Medications:   Z pack - antibiotics  Conjugated estrogens  Simvastatin  Esomemrazole  Methocarbanol  Gabapentin  Naproxen  Cefdiner   Vit D            PT Ortho       10/24/17 0800    Subjective Pain    Able to rate subjective pain? yes  -LF    Pre-Treatment Pain Level 4  -LF    Post-Treatment Pain Level 3  -LF    Special Tests/Palpation    Special Tests/Palpation --   TTP over L sciatic notch, greater trochanter  -LF    ROM (Range of Motion)    General ROM Detail trunk flexion 50 with c/o pulling in buttocks, ext/ SB WFL, no c/o   -LF      User Key  (r) = Recorded By, (t) = Taken By, (c) = Cosigned By    Initials Name Provider Type    TERRELL Rocha PT Physical Therapist                            Therapy Education       10/24/17 0800          Therapy Education    Given HEP;Symptoms/condition management;Pain management  -LF      Program New  -LF      How Provided Verbal;Demonstration;Written  -LF      Provided to Patient  -LF      Level of Understanding Teach back education performed;Verbalized;Demonstrated  -LF        User Key  (r) = Recorded By, (t) = Taken By, (c) = Cosigned By    Initials Name Provider Type    TERRELL Rocha PT Physical Therapist                 PT OP Goals       10/24/17 0800       PT Short Term Goals    STG Date to Achieve 11/07/17  -LF     STG 1 Pt I with HEP each session  -LF     STG 1 Progress Ongoing  -LF     STG 2 Improve lumbar spine flex/ext AROM to WNL  -LF     STG 2 Progress Progressing  -LF     STG 3 Decrease c/o hip pain to 1-2/10  -LF     STG 3 Progress Progressing  -LF     STG 4 Reduce LBP/L buttocks pain to 3/10 with 75% reduction in L LE radicular symptoms  -LF     STG 4 Progress Partially Met  -LF     Long Term Goals    LTG Date to Achieve 11/21/17  -LF     LTG 1 Improve L LE MMT to 4+/5  -LF     LTG 1 Progress Progressing  -LF     LTG 2 Reduce LBP/L buttocks pain to 1/10 with abolished 100% reduction in L LE radicular symptoms  -LF     LTG 2 Progress Progressing  -LF     LTG 3 Reduce Modified Oswestry score to 10 or less  -LF     LTG 3 Progress Progressing  -LF     Time Calculation    PT Goal Re-Cert Due Date 11/14/17  -LF       User Key  (r) = Recorded By, (t) = Taken By, (c) = Cosigned By    Initials Name Provider Type    LF Elsa Rocha, PT Physical Therapist                PT Assessment/Plan       10/24/17 0800       PT Assessment    Functional Limitations Impaired gait  -LF     Impairments Impaired flexibility;Pain  -LF     Assessment Comments Pt presents with improved trunk ROM, has c/o R hip pain. Pt is compliant with HEP. Pt continues to have hip apin, limited trunk flexion ,pain with ADLs, work duties, prolonged walking. Pt would benefit from continued skilled intervention to address the above mentioned deficits.   -LF     Please refer to paper survey for additional self-reported information Yes  -LF     Rehab Potential Good  -LF     Patient/caregiver participated in establishment of treatment plan and goals Yes  -LF     Patient would benefit from skilled therapy intervention Yes  -LF     PT Plan    PT Frequency 2x/week  -LF     Predicted Duration of Therapy Intervention (days/wks) 3 weeks  -LF     Planned CPT's? PT THER PROC  "EA 15 MIN: 93922;PT THER ACT EA 15 MIN: 16670;PT NEUROMUSC RE-EDUCATION EA 15 MIN: 73832  -LF     Physical Therapy Interventions (Optional Details) home exercise program;lumbar stabilization;manual therapy techniques;modalities;neuromuscular re-education;patient/family education;ROM (Range of Motion);strengthening;stretching  -LF     PT Plan Comments Continue therapy involving progressive hip/ trunk strengthening, stabilization training, progress HEP.   -LF       User Key  (r) = Recorded By, (t) = Taken By, (c) = Cosigned By    Initials Name Provider Type    LF Elsa Rocha, PT Physical Therapist                  Exercises       10/24/17 0800          Subjective Comments    Subjective Comments Pt states has not been to therapy du eto illness. State sfeelin gbetter today. Relates the L hip has been \"locking\" in the last couple days, whl estanding and walking. (spasming) Pt denies locking this morning , 8am. Pt states has been doing HEP while away. Pt dejan RTD 10/27/17.  -LF      Subjective Pain    Able to rate subjective pain? yes  -LF      Pre-Treatment Pain Level 4  -LF      Post-Treatment Pain Level 3  -LF      Exercise 1    Exercise Name 1 Pro II  -LF      Time (Minutes) 1 10  -LF      Exercise 2    Exercise Name 2 walk 1 lap in gym  -LF      Sets 2 --   1 lap   -LF      Exercise 3    Exercise Name 3 seated priformis stretch  -LF      Cueing 3 Verbal  -LF      Sets 3 2  -LF      Reps 3 10  -LF      Exercise 4    Exercise Name 4 seated resisted hip abd with band  -LF      Cueing 4 Verbal;Demo  -LF      Sets 4 3  -LF      Reps 4 20  -LF      Additional Comments Explain, demo, perform, educate for HEP, given WHEP  -LF      Exercise 5    Exercise Name 5 bridging  -LF      Cueing 5 Verbal  -LF      Sets 5 3  -LF      Reps 5 10  -LF      Exercise 6    Exercise Name 6 LTR  -LF      Cueing 6 Verbal  -LF      Sets 6 3  -LF      Reps 6 10  -LF      Exercise 7    Exercise Name 7 L sidelying clam  -LF      Cueing 7 Verbal  " -LF      Sets 7 3  -LF      Reps 7 10  -LF        User Key  (r) = Recorded By, (t) = Taken By, (c) = Cosigned By    Initials Name Provider Type    TERRELL Rocha, PT Physical Therapist                              Outcome Measures       10/24/17 0800          Modified Oswestry    Modified Oswestry Score/Comments 15=30%  -LF      Functional Assessment    Outcome Measure Options Modifed Owestry  -LF        User Key  (r) = Recorded By, (t) = Taken By, (c) = Cosigned By    Initials Name Provider Type    TERRELL Rocha, PT Physical Therapist            Time Calculation:   Start Time: 0758  Stop Time: 0838  Time Calculation (min): 40 min  Total Timed Code Minutes- PT: 40 minute(s)     Therapy Charges for Today     Code Description Service Date Service Provider Modifiers Qty    42252325511  PT THER PROC EA 15 MIN 10/24/2017 Elsa Rocha, PT GP 3          PT G-Codes  Outcome Measure Options: Modifed Owestry         Elsa Rocha, PT  10/24/2017

## 2017-10-26 ENCOUNTER — APPOINTMENT (OUTPATIENT)
Dept: PHYSICAL THERAPY | Facility: HOSPITAL | Age: 52
End: 2017-10-26

## 2017-10-31 ENCOUNTER — HOSPITAL ENCOUNTER (OUTPATIENT)
Dept: PHYSICAL THERAPY | Facility: HOSPITAL | Age: 52
Setting detail: THERAPIES SERIES
Discharge: HOME | End: 2017-10-31

## 2017-10-31 DIAGNOSIS — M54.42 ACUTE BACK PAIN WITH SCIATICA, LEFT: Primary | ICD-10-CM

## 2017-10-31 PROCEDURE — 97110 THERAPEUTIC EXERCISES: CPT

## 2017-11-02 ENCOUNTER — HOSPITAL ENCOUNTER (OUTPATIENT)
Dept: PHYSICAL THERAPY | Facility: HOSPITAL | Age: 52
Setting detail: THERAPIES SERIES
Discharge: HOME | End: 2017-11-02

## 2017-11-02 DIAGNOSIS — M54.42 ACUTE BACK PAIN WITH SCIATICA, LEFT: Primary | ICD-10-CM

## 2017-11-02 PROCEDURE — 97110 THERAPEUTIC EXERCISES: CPT

## 2017-11-02 NOTE — THERAPY TREATMENT NOTE
Outpatient Physical Therapy Ortho Treatment Note  [facility]  Radha Galicia ATC       Patient Name: Mallika Brewster  : 1965  MRN: [id number]  Today's Date: 2017      Visit Date: 2017  Pt reports 3/10 pain pre treatment,3 /10 pain post treatment  Reports beter% of improvement.  Attended  visits.  Insurance available: 20  Next MD appt: .  Recertification: 2017.  Visit Dx:    ICD-10-CM ICD-9-CM   1. Acute back pain with sciatica, left M54.42 724.3       There is no problem list on file for this patient.       Past Medical History:   Diagnosis Date   • Acid reflux    • Arthritis    • Back pain    • Bronchitis    • Diabetes mellitus    • High cholesterol    • Left hip pain         Past Surgical History:   Procedure Laterality Date   • CHOLECYSTECTOMY     • HYSTERECTOMY     • SHOULDER ROTATOR CUFF REPAIR Left              PT Ortho       17 0800    Subjective Comments    Subjective Comments (P)  patient reports that she still has her normal and contant 3-10 pain   -HB    Subjective Pain    Able to rate subjective pain? (P)  yes  -HB    Pre-Treatment Pain Level (P)  3   3-4  -HB      10/31/17 0800    Subjective Comments    Subjective Comments (P)  patient states feeling sore and having her normal pain in low back   -HB    Subjective Pain    Able to rate subjective pain? (P)  yes  -HB    Pre-Treatment Pain Level (P)  3  -HB    Post-Treatment Pain Level (P)  3  -HB    Subjective Pain Comment (P)  looser  -HB      User Key  (r) = Recorded By, (t) = Taken By, (c) = Cosigned By    Initials Name Provider Type     Radha Galicia ATC                             PT Assessment/Plan       17 0800       PT Assessment    Assessment Comments (P)  tolerate therex well.   -HB     PT Plan    PT Frequency (P)  2x/week  -HB     PT Plan Comments (P)  cont and progress as able   -HB       User Key  (r) = Recorded By, (t) = Taken By, (c) = Cosigned By     Initials Name Provider Type    HB Radha Galicia, ATC                 Modalities       11/02/17 0800          Subjective Pain    Post-Treatment Pain Level (P)  3  -HB      Subjective Pain Comment (P)  better and looser  -HB        User Key  (r) = Recorded By, (t) = Taken By, (c) = Cosigned By    Initials Name Provider Type    HB Radha Galicia, ATC                 Exercises       11/02/17 0800          Subjective Comments    Subjective Comments (P)  patient reports that she still has her normal and contant 3-4/10 pain   -HB      Subjective Pain    Able to rate subjective pain? (P)  yes  -HB      Pre-Treatment Pain Level (P)  3   3-4  -HB      Post-Treatment Pain Level (P)  3  -HB      Subjective Pain Comment (P)  better and looser  -HB      Exercise 1    Exercise Name 1 (P)  Pro II L5.0  -HB      Time (Minutes) 1 (P)  10 minutes   -HB      Exercise 2    Exercise Name 2 (P)  Seated piriformis  -HB      Reps 2 (P)  2  -HB      Time (Seconds) 2 (P)  30 sec   -HB      Exercise 3    Exercise Name 3 (P)  LTR   -HB      Reps 3 (P)  10  -HB      Time (Seconds) 3 (P)  10 sec   -HB      Exercise 4    Exercise Name 4 (P)  bridges   -HB      Reps 4 (P)  20  -HB      Exercise 5    Exercise Name 5 (P)  B clam shells   -HB      Reps 5 (P)  20  -HB      Exercise 6    Exercise Name 6 (P)  dead bug   -HB      Reps 6 (P)  15  -HB      Exercise 7    Exercise Name 7 (P)  prone TKE w/ GS  -HB      Reps 7 (P)  20  -HB      Exercise 8    Exercise Name 8 (P)  prone heel squeezes   -HB      Reps 8 (P)  20  -HB      Exercise 9    Exercise Name 9 (P)  Prone IR/ER  -HB      Reps 9 (P)  20  -HB      Exercise 10    Exercise Name 10 (P)  St TB SLR 3 way   -HB      Reps 10 (P)  10  -HB      Exercise 11    Exercise Name 11 (P)  B hip hikes   -HB      Reps 11 (P)  15  -HB      Exercise 12    Exercise Name 12 (P)  ANIA   -HB      Time (Minutes) 12 (P)  5 minutes   -HB      Exercise 13    Exercise Name 13 (P)  Prone  Pressups   -HB      Additional Comments (P)  defered due to pain ifrom neck crick   -HB      Exercise 14    Exercise Name 14 (P)  UTS   -HB      Reps 14 (P)  2  -HB      Time (Seconds) 14 (P)  30 sec   -HB        User Key  (r) = Recorded By, (t) = Taken By, (c) = Cosigned By    Initials Name Provider Type    HB Radha Galicia, ATC                                PT OP Goals       11/02/17 0800       PT Short Term Goals    STG Date to Achieve (P)  11/07/17  -HB     STG 1 (P)  Pt I with HEP each session  -HB     STG 1 Progress (P)  Ongoing  -HB     STG 2 (P)  Improve lumbar spine flex/ext AROM to WNL  -HB     STG 2 Progress (P)  Progressing  -HB     STG 3 (P)  Decrease c/o hip pain to 1-2/10  -HB     STG 3 Progress (P)  Progressing  -HB     STG 4 (P)  Reduce LBP/L buttocks pain to 3/10 with 75% reduction in L LE radicular symptoms  -HB     STG 4 Progress (P)  Partially Met  -HB     Long Term Goals    LTG Date to Achieve (P)  11/21/17  -HB     LTG 1 (P)  Improve L LE MMT to 4+/5  -HB     LTG 1 Progress (P)  Progressing  -HB     LTG 2 (P)  Reduce LBP/L buttocks pain to 1/10 with abolished 100% reduction in L LE radicular symptoms  -HB     LTG 2 Progress (P)  Progressing  -HB     LTG 3 (P)  Reduce Modified Oswestry score to 10 or less  -HB     LTG 3 Progress (P)  Progressing  -HB     Time Calculation    PT Goal Re-Cert Due Date (P)  11/14/17  -HB       User Key  (r) = Recorded By, (t) = Taken By, (c) = Cosigned By    Initials Name Provider Type    NEVAEH Galicia ATC                 Therapy Education       11/02/17 0800          Therapy Education    Given (P)  HEP  -HB      Program (P)  Reinforced  -HB      How Provided (P)  Verbal;Demonstration  -HB      Provided to (P)  Patient  -HB      Level of Understanding (P)  Verbalized;Demonstrated  -HB        User Key  (r) = Recorded By, (t) = Taken By, (c) = Cosigned By    Initials Name Provider Type    NEVAEH Galicia, ATC                  Time Calculation:   Start Time: (P) 0800  Stop Time: (P) 0842  Time Calculation (min): (P) 42 min    Therapy Charges for Today     Code Description Service Date Service Provider Modifiers Qty    17422613054  PT THER PROC EA 15 MIN 11/2/2017 Radha Galicia, ATC  3                    Radha Galicia, ATC  11/2/2017

## 2017-11-07 ENCOUNTER — HOSPITAL ENCOUNTER (OUTPATIENT)
Dept: PHYSICAL THERAPY | Facility: HOSPITAL | Age: 52
Setting detail: THERAPIES SERIES
Discharge: HOME | End: 2017-11-07

## 2017-11-07 DIAGNOSIS — M54.42 ACUTE BACK PAIN WITH SCIATICA, LEFT: Primary | ICD-10-CM

## 2017-11-07 PROCEDURE — 97110 THERAPEUTIC EXERCISES: CPT

## 2017-11-07 NOTE — THERAPY TREATMENT NOTE
Outpatient Physical Therapy Ortho Treatment Note  [facility]  Radha Galicia ATC       Patient Name: Mallika Brewster  : 1965  MRN: [id number]  Today's Date: 2017      Visit Date: 2017  Pt reports 3/10 pain pre treatment, 3/10 pain post treatment  Reports better% of improvement.  Attended 12/12 visits.  Insurance available: 20  Next MD appt: .  Recertification: 2017.  Visit Dx:    ICD-10-CM ICD-9-CM   1. Acute back pain with sciatica, left M54.42 724.3       There is no problem list on file for this patient.       Past Medical History:   Diagnosis Date   • Acid reflux    • Arthritis    • Back pain    • Bronchitis    • Diabetes mellitus    • High cholesterol    • Left hip pain         Past Surgical History:   Procedure Laterality Date   • CHOLECYSTECTOMY     • HYSTERECTOMY     • SHOULDER ROTATOR CUFF REPAIR Left              PT Ortho       17 0800    Subjective Comments    Subjective Comments (P)  reports that her normal amount of pain is still present and remains in the low back   -HB    Subjective Pain    Able to rate subjective pain? (P)  yes  -HB    Pre-Treatment Pain Level (P)  3  -HB      User Key  (r) = Recorded By, (t) = Taken By, (c) = Cosigned By    Initials Name Provider Type     Radha Galicia ATC                             PT Assessment/Plan       17 0800       PT Assessment    Assessment Comments (P)  per primary PT, patient will now be one land one pool for visits with still focus on land therex with prone extention exercises   -     PT Plan    PT Frequency (P)  2x/week  -     Predicted Duration of Therapy Intervention (days/wks) (P)  1 land/1pool  -     PT Plan Comments (P)  begin pool and next land add PN pball exercises   -       User Key  (r) = Recorded By, (t) = Taken By, (c) = Cosigned By    Initials Name Provider Type    NEVAEH Galicia ATC                     Exercises       17  0800          Subjective Comments    Subjective Comments (P)  reports that her normal amount of pain is still present and remains in the low back   -HB      Subjective Pain    Able to rate subjective pain? (P)  yes  -HB      Pre-Treatment Pain Level (P)  3  -HB      Exercise 1    Exercise Name 1 (P)  Pro II L5.0  -HB      Time (Minutes) 1 (P)  10 minutes   -HB      Exercise 2    Exercise Name 2 (P)  Seated piriformis  -HB      Reps 2 (P)  2  -HB      Time (Seconds) 2 (P)  30 sec   -HB      Exercise 3    Exercise Name 3 (P)  LTR   -HB      Reps 3 (P)  10  -HB      Time (Seconds) 3 (P)  10 sec   -HB      Exercise 4    Exercise Name 4 (P)  bridges   -HB      Reps 4 (P)  20  -HB      Exercise 5    Exercise Name 5 (P)  B clam shells   -HB      Exercise 6    Exercise Name 6 (P)  dead bug   -HB      Reps 6 (P)  20  -HB      Exercise 7    Exercise Name 7 (P)  prone TKE w/ GS  -HB      Reps 7 (P)  20  -HB      Exercise 8    Exercise Name 8 (P)  prone heel squeezes   -HB      Reps 8 (P)  20  -HB      Exercise 9    Exercise Name 9 (P)  Prone IR/ER  -HB      Reps 9 (P)  20  -HB      Exercise 10    Exercise Name 10 (P)  ANIA  -HB      Time (Minutes) 10 (P)  5 minutes   -HB      Exercise 11    Exercise Name 11 (P)  B hip hikes   -HB      Reps 11 (P)  20  -HB      Exercise 12    Exercise Name 12 (P)  fwd step ups with opp hip ext   -HB      Reps 12 (P)  15  -HB      Additional Comments (P)  Bilateral   -HB      Exercise 13    Exercise Name 13 (P)  lat step ups with opp hip abd  -HB      Reps 13 (P)  15  -HB      Additional Comments (P)  bilateral   -HB        User Key  (r) = Recorded By, (t) = Taken By, (c) = Cosigned By    Initials Name Provider Type    HB Radha Galicia, ATC                                PT OP Goals       11/07/17 0800       Time Calculation    PT Goal Re-Cert Due Date (P)  11/14/17  -HB       User Key  (r) = Recorded By, (t) = Taken By, (c) = Cosigned By    Initials Name Provider Type    NEVAEH Garcia  YSEENIA Galicia ATC                 Therapy Education       11/07/17 0800          Therapy Education    Given (P)  HEP  -HB      Program (P)  Reinforced  -HB      How Provided (P)  Verbal;Demonstration  -HB      Provided to (P)  Patient  -HB      Level of Understanding (P)  Verbalized;Demonstrated  -HB        User Key  (r) = Recorded By, (t) = Taken By, (c) = Cosigned By    Initials Name Provider Type    HB Radha Galicia, ATC                 Time Calculation:   Start Time: (P) 0758  Stop Time: (P) 0847  Time Calculation (min): (P) 49 min    Therapy Charges for Today     Code Description Service Date Service Provider Modifiers Qty    75703847080 HC PT THER PROC EA 15 MIN 11/7/2017 Radha Galicia ATC  3                    Radha Galicia ATC  11/7/2017

## 2017-11-09 ENCOUNTER — HOSPITAL ENCOUNTER (OUTPATIENT)
Dept: PHYSICAL THERAPY | Facility: HOSPITAL | Age: 52
Setting detail: THERAPIES SERIES
Discharge: HOME | End: 2017-11-09

## 2017-11-09 DIAGNOSIS — M54.42 ACUTE BACK PAIN WITH SCIATICA, LEFT: Primary | ICD-10-CM

## 2017-11-09 PROCEDURE — 97110 THERAPEUTIC EXERCISES: CPT

## 2017-11-09 NOTE — THERAPY TREATMENT NOTE
Outpatient Physical Therapy Ortho Treatment Note  [facility]  Radha Galicia ATC       Patient Name: Mallika Brewster  : 1965  MRN: [id number]  Today's Date: 2017      Visit Date: 2017  Pt reports 3/10 pain pre treatment, 3/10 pain post treatment  Reports better% of improvement.  Attended  visits.  Insurance available: 20  Next MD appt: .  Recertification: 2017.  Visit Dx:    ICD-10-CM ICD-9-CM   1. Acute back pain with sciatica, left M54.42 724.3       There is no problem list on file for this patient.       Past Medical History:   Diagnosis Date   • Acid reflux    • Arthritis    • Back pain    • Bronchitis    • Diabetes mellitus    • High cholesterol    • Left hip pain         Past Surgical History:   Procedure Laterality Date   • CHOLECYSTECTOMY     • HYSTERECTOMY     • SHOULDER ROTATOR CUFF REPAIR Left              PT Ortho       17 0800    Subjective Comments    Subjective Comments (P)  reports that her blood sugar has been up and down that past 12 hours and does not fel comfortable getting in the pool  this date  -HB    Subjective Pain    Able to rate subjective pain? (P)  yes  -HB    Pre-Treatment Pain Level (P)  3  -HB      17 0800    Subjective Comments    Subjective Comments (P)  reports that her normal amount of pain is still present and remains in the low back   -HB    Subjective Pain    Able to rate subjective pain? (P)  yes  -HB    Pre-Treatment Pain Level (P)  3  -HB      User Key  (r) = Recorded By, (t) = Taken By, (c) = Cosigned By    Initials Name Provider Type     Radha Galicia ATC                             PT Assessment/Plan       17 0800       PT Assessment    Assessment Comments (P)  tolerated therex well  -HB     PT Plan    PT Frequency (P)  2x/week  -HB     PT Plan Comments (P)  begin pool   -HB       User Key  (r) = Recorded By, (t) = Taken By, (c) = Cosigned By    Initials Name Provider Type     HB Radha PATTERSON [facility], [facility]                     Exercises       11/09/17 0800          Subjective Comments    Subjective Comments (P)  reports that her blood sugar has been up and down that past 12 hours and does not fel comfortable getting in the pool  this date  -HB      Subjective Pain    Able to rate subjective pain? (P)  yes  -HB      Pre-Treatment Pain Level (P)  3  -HB      Exercise 1    Exercise Name 1 (P)  Pro II L5.0  -HB      Time (Minutes) 1 (P)  10 minutes   -HB      Exercise 2    Exercise Name 2 (P)  Seated piriformis  -HB      Reps 2 (P)  2  -HB      Time (Seconds) 2 (P)  30 sec   -HB      Exercise 3    Exercise Name 3 (P)  LTR   -HB      Reps 3 (P)  10  -HB      Time (Seconds) 3 (P)  10 sec   -HB      Exercise 4    Exercise Name 4 (P)  bridges   -HB      Reps 4 (P)  20  -HB      Exercise 5    Exercise Name 5 (P)  prone heel squeezes  -HB      Reps 5 (P)  20  -HB      Exercise 6    Exercise Name 6 (P)  prone IR/ER  -HB      Reps 6 (P)  20  -HB      Exercise 7    Exercise Name 7 (P)  fwd step ups woith opp hip ext   -HB      Reps 7 (P)  15  -HB      Additional Comments (P)  bilateral   -HB      Exercise 8    Exercise Name 8 (P)  lat step ups with opp hip ext   -HB      Reps 8 (P)  15  -HB      Additional Comments (P)  bilateral   -HB      Exercise 9    Exercise Name 9 (P)  Hip hikes   -HB      Reps 9 (P)  20  -HB      Exercise 10    Exercise Name 10 (P)  pball: PN marches   -HB      Reps 10 (P)  20  -HB      Exercise 11    Exercise Name 11 (P)  pball: PN circles   -HB      Reps 11 (P)  15  -HB      Additional Comments (P)  cw/ccw  -HB      Exercise 12    Exercise Name 12 (P)  pball: PN LAQ  -HB      Reps 12 (P)  20  -HB      Exercise 13    Exercise Name 13 (P)  pball: PN ppt  -HB      Reps 13 (P)  20  -HB      Exercise 14    Exercise Name 14 (P)  pball: PN bounces   -HB      Reps 14 (P)  20  -HB      Exercise 15    Exercise Name 15 (P)  ANIA   -HB      Time (Minutes) 15 (P)  5 minutes   -HB         User Key  (r) = Recorded By, (t) = Taken By, (c) = Cosigned By    Initials Name Provider Type    HB Radha Galicia, ATC                                PT OP Goals       11/09/17 0800       Time Calculation    PT Goal Re-Cert Due Date (P)  11/14/17  -HB       User Key  (r) = Recorded By, (t) = Taken By, (c) = Cosigned By    Initials Name Provider Type    HB Radha Galicia, ATC                 Therapy Education       11/09/17 0800          Therapy Education    Given (P)  HEP  -HB      Program (P)  Reinforced  -HB      How Provided (P)  Verbal;Demonstration  -HB      Provided to (P)  Patient  -HB      Level of Understanding (P)  Verbalized;Demonstrated  -HB        User Key  (r) = Recorded By, (t) = Taken By, (c) = Cosigned By    Initials Name Provider Type    HB Radha Galicia, ATC                 Time Calculation:   Start Time: (P) 0800  Stop Time: (P) 0845  Time Calculation (min): (P) 45 min    Therapy Charges for Today     Code Description Service Date Service Provider Modifiers Qty    50142262091 HC PT THER PROC EA 15 MIN 11/9/2017 Radha Galicia, ATC  3                    Radha Galicia, ATC  11/9/2017

## 2017-11-14 ENCOUNTER — APPOINTMENT (OUTPATIENT)
Dept: PHYSICAL THERAPY | Facility: HOSPITAL | Age: 52
End: 2017-11-14

## 2017-11-16 ENCOUNTER — HOSPITAL ENCOUNTER (OUTPATIENT)
Dept: PHYSICAL THERAPY | Facility: HOSPITAL | Age: 52
Setting detail: THERAPIES SERIES
Discharge: HOME | End: 2017-11-16

## 2017-11-16 DIAGNOSIS — M54.42 ACUTE BACK PAIN WITH SCIATICA, LEFT: Primary | ICD-10-CM

## 2017-11-16 PROCEDURE — 97110 THERAPEUTIC EXERCISES: CPT

## 2017-11-16 NOTE — THERAPY TREATMENT NOTE
Outpatient Physical Therapy Ortho Treatment Note  [facility]     Patient Name: Mallika Brewster  : 1965  MRN: [id number]  Today's Date: 2017      Visit Date: 2017  Pt reports 3/10 pain pre treatment, 3/10 pain post treatment  Reports 60% of improvement.  Attended  visits.  Insurance available: 20 visits  Next MD appt: .  Recertification: 2017.  Visit Dx:    ICD-10-CM ICD-9-CM   1. Acute back pain with sciatica, left M54.42 724.3       There is no problem list on file for this patient.       Past Medical History:   Diagnosis Date   • Acid reflux    • Arthritis    • Back pain    • Bronchitis    • Diabetes mellitus    • High cholesterol    • Left hip pain         Past Surgical History:   Procedure Laterality Date   • CHOLECYSTECTOMY     • HYSTERECTOMY     • SHOULDER ROTATOR CUFF REPAIR Left              PT Ortho       17 0800    Subjective Comments    Subjective Comments Pt stated that she did not want to get into pool today secondary to having to go to work today and did not want to mess up her make up and hair.  -TL    Subjective Pain    Able to rate subjective pain? yes  -TL    Pre-Treatment Pain Level 3  -TL    ROM (Range of Motion)    General ROM Detail trunk flexion 90 degrees 6 inches from floor, SB and ext normal without c/o pain this date.  -TL      User Key  (r) = Recorded By, (t) = Taken By, (c) = Cosigned By    Initials Name Provider Type    TL Erica Williamson, PTA Physical Therapy Assistant                            PT Assessment/Plan       17 0800       PT Assessment    Assessment Comments Pt met short term goals 1 , 2 and 4. Pt progressing toward LTG. Pt ready for d.c possible next visit.  -TL     PT Plan    PT Frequency 2x/week  -TL     PT Plan Comments Possible d/c next visit if MMT good for left leg and pt agrees.  -TL       User Key  (r) = Recorded By, (t) = Taken By, (c) = Cosigned By    Initials Name Provider Type    TL  Erica Williamson PTA Physical Therapy Assistant                    Exercises       11/16/17 0800          Subjective Comments    Subjective Comments Pt stated that she did not want to get into pool today secondary to having to go to work today and did not want to mess up her make up and hair.  -TL      Subjective Pain    Able to rate subjective pain? yes  -TL      Pre-Treatment Pain Level 3  -TL      Exercise 1    Exercise Name 1 Pro II L5.0  -TL      Time (Minutes) 1 10 minutes   -TL      Additional Comments legs  -TL      Exercise 2    Exercise Name 2 Seated piriformis  -TL      Reps 2 2  -TL      Time (Seconds) 2 30 sec   -TL      Exercise 3    Exercise Name 3 LTR   -TL      Reps 3 10  -TL      Time (Seconds) 3 10 sec   -TL      Exercise 4    Exercise Name 4 bridges   -TL      Reps 4 30  -TL      Time (Seconds) 4 30 sec hold  -TL      Exercise 5    Exercise Name 5 QP hip hiking  -TL      Reps 5 20  -TL      Exercise 6    Exercise Name 6 QP kicks  -TL      Reps 6 20  -TL      Exercise 7    Exercise Name 7 prayer S  -TL      Sets 7 2  -TL      Time (Seconds) 7 30 sec hold  -TL      Exercise 8    Exercise Name 8 Fwd step ups opp  -TL      Reps 8 15  -TL      Additional Comments bilateral  -TL      Exercise 9    Exercise Name 9 Pball marching  -TL      Reps 9 20  -TL      Additional Comments focused on balance and posturing  -TL      Exercise 10    Exercise Name 10 Pball LAQ  -TL      Reps 10 20  -TL      Time (Seconds) 10 5 sec hold  -TL      Additional Comments focused on balance and posture  -TL      Exercise 11    Exercise Name 11 PBall wand over head and chest press  -TL      Reps 11 20  -TL      Exercise 12    Exercise Name 12 Pball shld rows mid   -TL      Reps 12 20  -TL      Additional Comments RTB  -TL        User Key  (r) = Recorded By, (t) = Taken By, (c) = Cosigned By    Initials Name Provider Type    TL Erica Williamson PTA Physical Therapy Assistant                               PT OP Goals        11/16/17 0800       PT Short Term Goals    STG Date to Achieve 11/07/17  -TL     STG 1 Pt I with HEP each session  -TL     STG 1 Progress Met  -TL     STG 2 Improve lumbar spine flex/ext AROM to WNL  -TL     STG 2 Progress Met  -TL     STG 3 Decrease c/o hip pain to 1-2/10  -TL     STG 3 Progress Progressing  -TL     STG 4 Reduce LBP/L buttocks pain to 3/10 with 75% reduction in L LE radicular symptoms  -TL     STG 4 Progress Met  -TL     Long Term Goals    LTG Date to Achieve 11/21/17  -TL     LTG 1 Improve L LE MMT to 4+/5  -TL     LTG 1 Progress Progressing  -TL     LTG 2 Reduce LBP/L buttocks pain to 1/10 with abolished 100% reduction in L LE radicular symptoms  -TL     LTG 2 Progress Progressing  -TL     LTG 3 Reduce Modified Oswestry score to 10 or less  -TL     LTG 3 Progress Progressing  -TL       User Key  (r) = Recorded By, (t) = Taken By, (c) = Cosigned By    Initials Name Provider Type    ANDRA Williamson PTA Physical Therapy Assistant                Therapy Education       11/16/17 0800          Therapy Education    Given HEP  -TL      Program Reinforced  -TL      How Provided Demonstration;Verbal  -TL      Provided to Patient  -TL      Level of Understanding Verbalized  -TL        User Key  (r) = Recorded By, (t) = Taken By, (c) = Cosigned By    Initials Name Provider Type    ANDRA Williamson PTA Physical Therapy Assistant                Time Calculation:   Start Time: 0800  Stop Time: 0843  Time Calculation (min): 43 min  Total Timed Code Minutes- PT: 43 minute(s)    Therapy Charges for Today     Code Description Service Date Service Provider Modifiers Qty    91860664745  PT THER PROC EA 15 MIN 11/16/2017 Erica Williamson PTA GP 3                    Erica Williamson PTA  11/16/2017

## 2017-11-17 ENCOUNTER — HOSPITAL ENCOUNTER (OUTPATIENT)
Dept: PHYSICAL THERAPY | Facility: HOSPITAL | Age: 52
Setting detail: THERAPIES SERIES
Discharge: HOME | End: 2017-11-17

## 2017-11-17 DIAGNOSIS — M54.42 ACUTE BACK PAIN WITH SCIATICA, LEFT: Primary | ICD-10-CM

## 2017-11-17 PROCEDURE — 97110 THERAPEUTIC EXERCISES: CPT | Performed by: PHYSICAL THERAPIST

## 2017-11-17 NOTE — THERAPY DISCHARGE NOTE
Outpatient Physical Therapy Ortho Discharge Summary  [facility]     Patient Name: Mallika Brewster  : 1965  MRN: [id number]  Today's Date: 2017      Visit Date: 2017  Pt reports 3/10 pain pre treatment, 1/10 pain post treatment  Reports 60% of improvement.  Attended 14/18 visits.  Insurance available: 20 visits  Next MD appt: .  Recertification: N/A-D/c'd.  Visit Dx:    ICD-10-CM ICD-9-CM   1. Acute back pain with sciatica, left M54.42 724.3       There is no problem list on file for this patient.       Past Medical History:   Diagnosis Date   • Acid reflux    • Arthritis    • Back pain    • Bronchitis    • Diabetes mellitus    • High cholesterol    • Left hip pain         Past Surgical History:   Procedure Laterality Date   • CHOLECYSTECTOMY     • HYSTERECTOMY     • SHOULDER ROTATOR CUFF REPAIR Left              PT Ortho       17 0800    Precautions and Contraindications    Precautions/Limitations no known precautions/limitations  -BS    Subjective Pain    Post-Treatment Pain Level 1  -BS    Myotomal Screen- Lower Quarter Clearing    Hip flexion (L2) 4 (Good)  -BS    Knee extension (L3) 4 (Good)  -BS      17 0800    Subjective Comments    Subjective Comments Pt stated that she did not want to get into pool today secondary to having to go to work today and did not want to mess up her make up and hair.  -TL    Subjective Pain    Able to rate subjective pain? yes  -TL    Pre-Treatment Pain Level 3  -TL    ROM (Range of Motion)    General ROM Detail trunk flexion 90 degrees 6 inches from floor, SB and ext normal without c/o pain this date.  -TL      User Key  (r) = Recorded By, (t) = Taken By, (c) = Cosigned By    Initials Name Provider Type    BS Herminio Soto, PT Physical Therapist    TL Erica Williamson, JANNIE Physical Therapy Assistant                            PT Assessment/Plan       17 0800       PT Assessment    Functional Limitations  Impaired gait  -BS     Impairments Impaired flexibility;Pain  -BS     Assessment Comments Reduced LBP and L LE sciatica to 1/10 level with prone lumbar ext mobilization and with prone press ups with clinician OP at L5 segment. D/c'd at this time per pt request and met a functional plateau.  -BS     Please refer to paper survey for additional self-reported information Yes  -BS     Rehab Potential Good  -BS     Patient/caregiver participated in establishment of treatment plan and goals Yes  -BS     Patient would benefit from skilled therapy intervention No  -BS     PT Plan    PT Frequency Other (comment)   D/c visit  -BS     Predicted Duration of Therapy Intervention (days/wks) D/c'd today  -BS     PT Plan Comments D/c'd from skilled PT at this time  -BS       User Key  (r) = Recorded By, (t) = Taken By, (c) = Cosigned By    Initials Name Provider Type    BS Herminio Soto, PT Physical Therapist                    Exercises       11/17/17 0800          Subjective Comments    Subjective Comments pt reports left sided sciatica rarely radiates inferior to the proximal left thigh, doing HEP 2x/day, 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  -BS      Subjective Pain    Able to rate subjective pain? yes  -BS      Pre-Treatment Pain Level 3  -BS      Post-Treatment Pain Level 1  -BS      Exercise 1    Exercise Name 1 Pro II   -BS      Time (Minutes) 1 10 minutes   -BS      Exercise 2    Exercise Name 2 prone lumbar ext mobilization w/ clinician at L5  -BS      Time (Minutes) 2 3'  -BS      Exercise 3    Exercise Name 3 REIL w/ clinician OP  -BS      Sets 3 1  -BS      Reps 3 10  -BS      Exercise 4    Exercise Name 4 REIL   -BS      Sets 4 1  -BS      Reps 4 10  -BS      Exercise 5    Exercise Name 5 JOSUE   -BS      Sets 5 2  -BS      Reps 5 10  -BS      Exercise 6    Exercise Name 6 JOSUE w/ self OP  -BS      Sets 6 1  -BS      Reps 6 10  -BS       Exercise 7    Exercise Name 7 SGIS to the R  -BS      Sets 7 1  -BS      Reps 7 10  -BS      Exercise 8    Exercise Name 8 prone B hip ext  -BS      Sets 8 2  -BS      Reps 8 10  -BS      Exercise 9    Exercise Name 9 B SLR  -BS      Sets 9 1  -BS      Reps 9 10  -BS        User Key  (r) = Recorded By, (t) = Taken By, (c) = Cosigned By    Initials Name Provider Type    JORDAN Soto, PT Physical Therapist                               PT OP Goals       11/17/17 0800       PT Short Term Goals    STG Date to Achieve 11/07/17  -BS     STG 1 Pt I with HEP each session  -BS     STG 1 Progress Met  -BS     STG 2 Improve lumbar spine flex/ext AROM to WNL  -BS     STG 2 Progress Met  -BS     STG 3 Decrease c/o hip pain to 1-2/10  -BS     STG 3 Progress Met  -BS     STG 4 Reduce LBP/L buttocks pain to 3/10 with 75% reduction in L LE radicular symptoms  -BS     STG 4 Progress Met  -BS     Long Term Goals    LTG Date to Achieve 11/21/17  -BS     LTG 1 Improve L LE MMT to 4+/5  -BS     LTG 1 Progress Progressing  -BS     LTG 2 Reduce LBP/L buttocks pain to 1/10 with abolished 100% reduction in L LE radicular symptoms  -BS     LTG 2 Progress Partially Met  -BS     LTG 3 Reduce Modified Oswestry score to 10 or less  -BS     LTG 3 Progress Progressing  -BS       User Key  (r) = Recorded By, (t) = Taken By, (c) = Cosigned By    Initials Name Provider Type    JORDAN Soto, PT Physical Therapist                Therapy Education       11/17/17 0800          Therapy Education    Given HEP  -BS      Program Reinforced  -BS      How Provided Demonstration;Verbal  -BS      Provided to Patient  -BS      Level of Understanding Verbalized  -BS        User Key  (r) = Recorded By, (t) = Taken By, (c) = Cosigned By    Initials Name Provider Type    JORDAN Soto PT Physical Therapist                Outcome Measures       11/17/17 0800          Modified Oswestry    Modified Oswestry Score/Comments 15-30%  -BS      Functional  Assessment    Outcome Measure Options Odilia Back  -JORDAN        User Key  (r) = Recorded By, (t) = Taken By, (c) = Cosigned By    Initials Name Provider Type    BS Herminio Soto, PT Physical Therapist            Time Calculation:   Start Time: 0801  Stop Time: 0841  Time Calculation (min): 40 min    Therapy Charges for Today     Code Description Service Date Service Provider Modifiers Qty    03927985423 HC PT THER PROC EA 15 MIN 11/17/2017 Herminio Soto, PT GP 3          PT G-Codes  Outcome Measure Options: Odilia Soto, PT  11/17/2017

## 2017-11-21 ENCOUNTER — APPOINTMENT (OUTPATIENT)
Dept: PHYSICAL THERAPY | Facility: HOSPITAL | Age: 52
End: 2017-11-21

## 2020-03-30 NOTE — THERAPY EVALUATION
Outpatient Physical Therapy Ortho Initial Evaluation  [facility]     Patient Name: Mallika Brewster  : 1965  MRN: [id number]  Today's Date: 9/15/2017      Visit Date: 09/15/2017  Visit   Re-cert date: 10/6/17  Return to MD:TIFFANIE  There is no problem list on file for this patient.       No past medical history on file.     No past surgical history on file.    Visit Dx:     ICD-10-CM ICD-9-CM   1. Acute back pain with sciatica, left M54.42 724.3     Outpatient Medications     None      Allergies: NKA            PT Ortho       09/15/17 1000    Subjective Comments    Subjective Comments 51 yo female with 3+ month h/o acute low back pain and L sided sciatica travelling distally to the left ankle. Insidious onset LBP, worsening LBP and L sided sciatica since onset. Had an MRI on 17 showing L5-S1 mild posterior disc bulge. Aggravating factors: prolonged standing, bending over, driving for prolonged periods or sitting > 30 min.  -BS    Precautions and Contraindications    Precautions/Limitations no known precautions/limitations  -BS    Subjective Pain    Able to rate subjective pain? yes  -BS    Pre-Treatment Pain Level 5  -BS    Post-Treatment Pain Level 4  -BS    Quarter Clearing    Quarter Clearing Lower Quarter Clearing  -BS    Sensory Screen for Light Touch- Lower Quarter Clearing    L1 (inguinal area) Bilateral:;Intact  -BS    L2 (anterior mid thigh) Bilateral:;Intact  -BS    L3 (distal anterior thigh) Bilateral:;Intact  -BS    L4 (medial lower leg/foot) Bilateral:;Intact  -BS    L5 (lateral lower leg/great toe) Bilateral:;Intact  -BS    S1 (bottom of foot) Bilateral:;Intact  -BS    Myotomal Screen- Lower Quarter Clearing    Hip flexion (L2) Right:;4+ (Good +);Left:;3+ (Fair +)  -BS    Knee extension (L3) Right:;4- (Good -);Left:;3+ (Fair +)  -BS    Ankle DF (L4) Bilateral:;5 (Normal)  -BS    Great toe extension (L5) Bilateral:;5 (Normal)  -BS    Ankle PF (S1) Bilateral:;5  Pt on nasal cannula flow as documented in flowsheets.  Pt in no apparent respiratory distress.  Will continue to monitor.     (Normal)  -BS    Knee flexion (S2) Right:;5 (Normal);Left:;4+ (Good +)  -BS    Lumbar ROM Screen- Lower Quarter Clearing    Lumbar Flexion Impaired   min limit 75%  -BS    Lumbar Extension Impaired   50% mod limit  -BS    Lumbar Lateral Flexion Normal  -BS    Lumbar Rotation Normal  -BS      User Key  (r) = Recorded By, (t) = Taken By, (c) = Cosigned By    Initials Name Provider Type    JORDAN Soto, PT Physical Therapist                            Therapy Education       09/15/17 1300          Therapy Education    Given HEP;Symptoms/condition management;Pain management;Posture/body mechanics;Mobility training  -BS      Program New  -BS      How Provided Verbal;Demonstration;Written  -BS      Provided to Patient  -BS      Level of Understanding Verbalized;Demonstrated  -BS        User Key  (r) = Recorded By, (t) = Taken By, (c) = Cosigned By    Initials Name Provider Type    JORDAN Soto, PT Physical Therapist                PT OP Goals       09/15/17 1300 09/15/17 1000    PT Short Term Goals    STG Date to Achieve 09/29/17  -BS     STG 1 Pt I with HEP  -BS     STG 1 Progress New  -BS     STG 2 Improve lumbar spine flex/ext AROM to WNL  -BS     STG 2 Progress New  -BS     STG 3 Improve L LE MMT to 4/5  -BS     STG 3 Progress New  -BS     STG 4 Reduce LBP/L buttocks pain to 3/10 with 75% reduction in L LE radicular symptoms  -BS     STG 4 Progress New  -BS     Long Term Goals    LTG Date to Achieve 10/13/17  -BS     LTG 1 Improve L LE MMT to 4+/5  -BS     LTG 1 Progress New  -BS     LTG 2 Reduce LBP/L buttocks pain to 1/10 with abolished 100% reduction in L LE radicular symptoms  -BS     LTG 2 Progress New  -BS     LTG 3 Reduce Modified Oswestry score to 15 or less  -BS     LTG 3 Progress New  -BS     Time Calculation    PT Goal Re-Cert Due Date --  -BS 10/06/17  -BS      User Key  (r) = Recorded By, (t) = Taken By, (c) = Cosigned By    Initials Name Provider Type    JORDAN Soto, PT Physical Therapist                 PT Assessment/Plan       09/15/17 1300       PT Assessment    Functional Limitations Impaired gait;Limitation in home management;Limitations in community activities;Performance in leisure activities;Performance in work activities;Performance in sport activities;Performance in self-care ADL  -BS     Assessment Comments acute onset left lumbar radiculopathy. Patient presents with left sided LBP/L buttocks pain, intermittent L LE radicular symptoms, L>R LE weakness, limited lumbar ROM and limited sitting tolerance.  -BS     Please refer to paper survey for additional self-reported information Yes  -BS     Rehab Potential Good  -BS     Patient/caregiver participated in establishment of treatment plan and goals Yes  -BS     Patient would benefit from skilled therapy intervention Yes  -BS     PT Plan    PT Frequency 2x/week  -BS     Predicted Duration of Therapy Intervention (days/wks) 4 weeks  -BS     Planned CPT's? PT EVAL LOW COMPLEXITY: 31963;PT RE-EVAL: 64662;PT THER PROC EA 15 MIN: 82063;PT THER ACT EA 15 MIN: 64460;PT MANUAL THERAPY EA 15 MIN: 63863;PT ELECTRICAL STIM UNATTEND: ;PT ULTRASOUND EA 15 MIN: 10227;PT TRACTION LUMBAR: 90933;PT HOT/COLD PACK WC NONMCARE: 63765;PT THER SUPP EA 15 MIN  -BS     Physical Therapy Interventions (Optional Details) gait training;home exercise program;joint mobilization;lumbar stabilization;manual therapy techniques;modalities;neuromuscular re-education;patient/family education;postural re-education;ROM (Range of Motion);stair training;strengthening;stretching;transfer training  -BS     PT Plan Comments f/u with prone progression, attempt prone on elbows next session  -BS       User Key  (r) = Recorded By, (t) = Taken By, (c) = Cosigned By    Initials Name Provider Type    BS Herminio Soto, PT Physical Therapist                  Exercises       09/15/17 1000          Subjective Comments    Subjective Comments 51 yo female with 3+ month h/o acute low back pain and  L sided sciatica travelling distally to the left ankle. Insidious onset LBP, worsening LBP and L sided sciatica since onset. Had an MRI on 5/24/17 showing L5-S1 mild posterior disc bulge. Aggravating factors: prolonged standing, bending over, driving for prolonged periods or sitting > 30 min.  -BS      Subjective Pain    Able to rate subjective pain? yes  -BS      Pre-Treatment Pain Level 5  -BS      Post-Treatment Pain Level 4  -BS      Exercise 1    Exercise Name 1 prone lying   -BS      Time (Minutes) 1 5'  -BS      Exercise 2    Exercise Name 2 prone lying on 1 pillow  -BS      Time (Minutes) 2 2'  -BS      Exercise 3    Exercise Name 3 supine piriformis stretch  -BS      Sets 3 1  -BS      Reps 3 1  -BS        User Key  (r) = Recorded By, (t) = Taken By, (c) = Cosigned By    Initials Name Provider Type    JORDAN Soto, PT Physical Therapist                              Outcome Measures       09/15/17 1000          Modified Oswestry    Modified Oswestry Score/Comments 21-42%  -BS      Functional Assessment    Outcome Measure Options Modifed Owestry  -BS        User Key  (r) = Recorded By, (t) = Taken By, (c) = Cosigned By    Initials Name Provider Type    JORDAN Soto, PT Physical Therapist            Time Calculation:   Start Time: 1018  Stop Time: 1058  Time Calculation (min): 40 min     Therapy Charges for Today     Code Description Service Date Service Provider Modifiers Qty    40996579868 HC PT EVAL LOW COMPLEXITY 2 9/15/2017 Herminio Soto, PT GP 1    23145232190 HC PT THER PROC EA 15 MIN 9/15/2017 Herminio Soto, PT GP 1          PT G-Codes  Outcome Measure Options: Modifed Owestry         Herminio Soto PT  9/15/2017

## 2020-05-08 ENCOUNTER — OFFICE VISIT (OUTPATIENT)
Dept: INTERNAL MEDICINE | Facility: CLINIC | Age: 55
End: 2020-05-08

## 2020-05-08 VITALS
HEIGHT: 60 IN | SYSTOLIC BLOOD PRESSURE: 117 MMHG | DIASTOLIC BLOOD PRESSURE: 74 MMHG | HEART RATE: 85 BPM | OXYGEN SATURATION: 95 % | TEMPERATURE: 96.9 F | WEIGHT: 149.4 LBS | BODY MASS INDEX: 29.33 KG/M2

## 2020-05-08 DIAGNOSIS — F41.9 ANXIETY: ICD-10-CM

## 2020-05-08 DIAGNOSIS — Z13.29 THYROID DISORDER SCREENING: ICD-10-CM

## 2020-05-08 DIAGNOSIS — K21.9 GASTROESOPHAGEAL REFLUX DISEASE WITHOUT ESOPHAGITIS: ICD-10-CM

## 2020-05-08 DIAGNOSIS — N95.1 MENOPAUSAL SYNDROME: ICD-10-CM

## 2020-05-08 DIAGNOSIS — E11.9 TYPE 2 DIABETES MELLITUS WITHOUT COMPLICATION, WITHOUT LONG-TERM CURRENT USE OF INSULIN (HCC): Primary | ICD-10-CM

## 2020-05-08 DIAGNOSIS — J30.89 ENVIRONMENTAL AND SEASONAL ALLERGIES: ICD-10-CM

## 2020-05-08 DIAGNOSIS — I10 ESSENTIAL HYPERTENSION: ICD-10-CM

## 2020-05-08 DIAGNOSIS — Z12.39 SCREENING FOR BREAST CANCER: ICD-10-CM

## 2020-05-08 DIAGNOSIS — F33.1 MODERATE EPISODE OF RECURRENT MAJOR DEPRESSIVE DISORDER (HCC): ICD-10-CM

## 2020-05-08 DIAGNOSIS — E78.5 HYPERLIPIDEMIA, UNSPECIFIED HYPERLIPIDEMIA TYPE: ICD-10-CM

## 2020-05-08 DIAGNOSIS — F51.01 PRIMARY INSOMNIA: ICD-10-CM

## 2020-05-08 DIAGNOSIS — Z13.21 ENCOUNTER FOR VITAMIN DEFICIENCY SCREENING: ICD-10-CM

## 2020-05-08 DIAGNOSIS — K58.1 IRRITABLE BOWEL SYNDROME WITH CONSTIPATION: ICD-10-CM

## 2020-05-08 LAB
ALBUMIN SERPL-MCNC: 4.4 G/DL (ref 3.5–5.2)
ALBUMIN/GLOB SERPL: 1.4 G/DL
ALP SERPL-CCNC: 61 U/L (ref 39–117)
ALT SERPL W P-5'-P-CCNC: 75 U/L (ref 1–33)
ANION GAP SERPL CALCULATED.3IONS-SCNC: 13.8 MMOL/L (ref 5–15)
AST SERPL-CCNC: 56 U/L (ref 1–32)
BASOPHILS # BLD AUTO: 0.11 10*3/MM3 (ref 0–0.2)
BASOPHILS NFR BLD AUTO: 1.3 % (ref 0–1.5)
BILIRUB SERPL-MCNC: 1.5 MG/DL (ref 0.2–1.2)
BUN BLD-MCNC: 12 MG/DL (ref 6–20)
BUN/CREAT SERPL: 18.2 (ref 7–25)
CALCIUM SPEC-SCNC: 10 MG/DL (ref 8.6–10.5)
CHLORIDE SERPL-SCNC: 100 MMOL/L (ref 98–107)
CHOLEST SERPL-MCNC: 167 MG/DL (ref 0–200)
CO2 SERPL-SCNC: 25.2 MMOL/L (ref 22–29)
CREAT BLD-MCNC: 0.66 MG/DL (ref 0.57–1)
DEPRECATED RDW RBC AUTO: 37.6 FL (ref 37–54)
EOSINOPHIL # BLD AUTO: 0.3 10*3/MM3 (ref 0–0.4)
EOSINOPHIL NFR BLD AUTO: 3.6 % (ref 0.3–6.2)
ERYTHROCYTE [DISTWIDTH] IN BLOOD BY AUTOMATED COUNT: 12.1 % (ref 12.3–15.4)
GFR SERPL CREATININE-BSD FRML MDRD: 114 ML/MIN/1.73
GFR SERPL CREATININE-BSD FRML MDRD: 94 ML/MIN/1.73
GLOBULIN UR ELPH-MCNC: 3.1 GM/DL
GLUCOSE BLD-MCNC: 155 MG/DL (ref 65–99)
HBA1C MFR BLD: 8.13 % (ref 4.8–5.6)
HCT VFR BLD AUTO: 44.1 % (ref 34–46.6)
HDLC SERPL-MCNC: 46 MG/DL (ref 40–60)
HGB BLD-MCNC: 15.1 G/DL (ref 12–15.9)
IMM GRANULOCYTES # BLD AUTO: 0.01 10*3/MM3 (ref 0–0.05)
IMM GRANULOCYTES NFR BLD AUTO: 0.1 % (ref 0–0.5)
LDLC SERPL CALC-MCNC: 64 MG/DL (ref 0–100)
LDLC/HDLC SERPL: 1.39 {RATIO}
LYMPHOCYTES # BLD AUTO: 3.22 10*3/MM3 (ref 0.7–3.1)
LYMPHOCYTES NFR BLD AUTO: 38.2 % (ref 19.6–45.3)
MCH RBC QN AUTO: 29.4 PG (ref 26.6–33)
MCHC RBC AUTO-ENTMCNC: 34.2 G/DL (ref 31.5–35.7)
MCV RBC AUTO: 86 FL (ref 79–97)
MONOCYTES # BLD AUTO: 0.57 10*3/MM3 (ref 0.1–0.9)
MONOCYTES NFR BLD AUTO: 6.8 % (ref 5–12)
NEUTROPHILS # BLD AUTO: 4.23 10*3/MM3 (ref 1.7–7)
NEUTROPHILS NFR BLD AUTO: 50 % (ref 42.7–76)
NRBC BLD AUTO-RTO: 0 /100 WBC (ref 0–0.2)
PLATELET # BLD AUTO: 371 10*3/MM3 (ref 140–450)
PMV BLD AUTO: 10.8 FL (ref 6–12)
POTASSIUM BLD-SCNC: 4.4 MMOL/L (ref 3.5–5.2)
PROT SERPL-MCNC: 7.5 G/DL (ref 6–8.5)
RBC # BLD AUTO: 5.13 10*6/MM3 (ref 3.77–5.28)
SODIUM BLD-SCNC: 139 MMOL/L (ref 136–145)
TRIGL SERPL-MCNC: 286 MG/DL (ref 0–150)
VLDLC SERPL-MCNC: 57.2 MG/DL (ref 5–40)
WBC NRBC COR # BLD: 8.44 10*3/MM3 (ref 3.4–10.8)

## 2020-05-08 PROCEDURE — 80061 LIPID PANEL: CPT | Performed by: NURSE PRACTITIONER

## 2020-05-08 PROCEDURE — 85025 COMPLETE CBC W/AUTO DIFF WBC: CPT | Performed by: NURSE PRACTITIONER

## 2020-05-08 PROCEDURE — 84443 ASSAY THYROID STIM HORMONE: CPT | Performed by: NURSE PRACTITIONER

## 2020-05-08 PROCEDURE — 80053 COMPREHEN METABOLIC PANEL: CPT | Performed by: NURSE PRACTITIONER

## 2020-05-08 PROCEDURE — 82607 VITAMIN B-12: CPT | Performed by: NURSE PRACTITIONER

## 2020-05-08 PROCEDURE — 82672 ASSAY OF ESTROGEN: CPT | Performed by: NURSE PRACTITIONER

## 2020-05-08 PROCEDURE — 99204 OFFICE O/P NEW MOD 45 MIN: CPT | Performed by: NURSE PRACTITIONER

## 2020-05-08 PROCEDURE — 82746 ASSAY OF FOLIC ACID SERUM: CPT | Performed by: NURSE PRACTITIONER

## 2020-05-08 PROCEDURE — 83036 HEMOGLOBIN GLYCOSYLATED A1C: CPT | Performed by: NURSE PRACTITIONER

## 2020-05-08 PROCEDURE — 82306 VITAMIN D 25 HYDROXY: CPT | Performed by: NURSE PRACTITIONER

## 2020-05-08 RX ORDER — LANCETS 30 GAUGE
EACH MISCELLANEOUS
Qty: 100 EACH | Refills: 5 | Status: SHIPPED | OUTPATIENT
Start: 2020-05-08 | End: 2022-04-08 | Stop reason: SDUPTHER

## 2020-05-08 RX ORDER — BLOOD-GLUCOSE METER
1 KIT MISCELLANEOUS AS NEEDED
Qty: 1 EACH | Refills: 0 | Status: SHIPPED | OUTPATIENT
Start: 2020-05-08

## 2020-05-08 RX ORDER — PANTOPRAZOLE SODIUM 40 MG/1
40 TABLET, DELAYED RELEASE ORAL DAILY
Qty: 90 TABLET | Refills: 3
Start: 2020-05-08 | End: 2020-08-03

## 2020-05-08 RX ORDER — LOSARTAN POTASSIUM 25 MG/1
1 TABLET ORAL DAILY
COMMUNITY
Start: 2020-04-15 | End: 2022-02-09 | Stop reason: SDUPTHER

## 2020-05-08 RX ORDER — HYDROXYZINE HYDROCHLORIDE 25 MG/1
25 TABLET, FILM COATED ORAL
COMMUNITY
Start: 2020-04-27 | End: 2020-05-27

## 2020-05-08 RX ORDER — MONTELUKAST SODIUM 10 MG/1
1 TABLET ORAL DAILY
COMMUNITY
Start: 2020-04-05 | End: 2021-04-22 | Stop reason: SDUPTHER

## 2020-05-08 RX ORDER — ESCITALOPRAM OXALATE 10 MG/1
10 TABLET ORAL DAILY
COMMUNITY
Start: 2020-04-27 | End: 2020-07-07 | Stop reason: ALTCHOICE

## 2020-05-08 RX ORDER — NABUMETONE 500 MG/1
TABLET, FILM COATED ORAL
COMMUNITY
Start: 2020-05-01

## 2020-05-08 RX ORDER — SIMVASTATIN 20 MG
1 TABLET ORAL DAILY
COMMUNITY
Start: 2020-04-27 | End: 2021-08-06 | Stop reason: SDUPTHER

## 2020-05-08 RX ORDER — TOLTERODINE 4 MG/1
4 CAPSULE, EXTENDED RELEASE ORAL DAILY
COMMUNITY
Start: 2020-01-27 | End: 2020-05-08

## 2020-05-08 RX ORDER — TRAZODONE HYDROCHLORIDE 50 MG/1
TABLET ORAL
Qty: 45 TABLET | Refills: 1 | Status: SHIPPED | OUTPATIENT
Start: 2020-05-08 | End: 2020-07-07

## 2020-05-08 RX ORDER — SUCRALFATE 1 G/1
4 TABLET ORAL DAILY
COMMUNITY
Start: 2020-05-05

## 2020-05-08 NOTE — PROGRESS NOTES
Subjective   Chief Complaint   Patient presents with   • Establish Care     Needing primary care provider       Mallika Brewster is a 55 y.o. female here today to establish care for diabetes, hypertension, GERD, depression, anxiety, and insomnia.  Blood sugars have always been well controlled taking metformin but she has been unable to check them due to not having a meter and strips.  Blood pressures have been stable and at goal.  She follows a heart healthy low-cholesterol and fat diabetic diet.  She has done well with her diet.  She has anxiety and depression that has been well controlled with Lexapro.  She takes hydroxyzine as needed for anxiety.  She has not been sleeping well.  She has difficulty falling asleep and wakes up frequently throughout the night.  She has not tried anything previously for sleep.  She has some significant GERD symptoms and was taking Prilosec but ran out of this.  She is still taking Carafate.  She has irritable bowel with constipation that has improved with Linzess.  She has seasonal allergies and takes Singulair at bedtime.  She has some menopausal symptoms with hot flashes and night sweats and used to take estrogen.  She is concerned about her estrogen level.  No shortness of breath or chest pain.    Review of Systems   Constitutional: Positive for fatigue. Negative for activity change, appetite change, chills, diaphoresis, fever, unexpected weight gain and unexpected weight loss.   HENT: Negative for congestion, ear discharge, ear pain, mouth sores, nosebleeds, sinus pressure, sneezing and sore throat.    Eyes: Negative for pain, discharge and itching.   Respiratory: Negative for cough, chest tightness, shortness of breath and wheezing.    Cardiovascular: Negative for chest pain, palpitations and leg swelling.   Gastrointestinal: Positive for nausea, GERD and indigestion. Negative for abdominal pain, constipation, diarrhea and vomiting.        Colonoscopy - 2017 - normal    Endocrine: Negative for heat intolerance, polydipsia, polyphagia and polyuria.   Genitourinary: Negative for dysuria, flank pain, frequency, hematuria and urgency.   Musculoskeletal: Positive for arthralgias and back pain. Negative for gait problem, joint swelling, myalgias, neck pain and neck stiffness.   Skin: Negative for color change, pallor and rash.   Allergic/Immunologic: Positive for environmental allergies. Negative for immunocompromised state.   Neurological: Negative for seizures, speech difficulty, weakness and numbness.   Hematological: Negative for adenopathy.   Psychiatric/Behavioral: Positive for sleep disturbance. Negative for agitation, decreased concentration, suicidal ideas and depressed mood. The patient is not nervous/anxious.        Past Medical History:   Diagnosis Date   • Acid reflux    • Arthritis    • Back pain    • Bronchitis    • Diabetes mellitus (CMS/HCC)    • High cholesterol    • Left hip pain      Past Surgical History:   Procedure Laterality Date   • CHOLECYSTECTOMY     • HYSTERECTOMY     • SHOULDER ROTATOR CUFF REPAIR Left      Family History   Problem Relation Age of Onset   • Mental illness Other      Social History     Tobacco Use   Smoking Status Current Every Day Smoker   • Packs/day: 0.50   • Start date: 9/22/1987   Smokeless Tobacco Never Used      Social History     Substance and Sexual Activity   Alcohol Use No      Current Outpatient Medications on File Prior to Visit   Medication Sig   • escitalopram (LEXAPRO) 10 MG tablet Take 10 mg by mouth Daily.   • linaclotide (LINZESS) 145 MCG capsule capsule Take 145 mcg by mouth Daily.   • losartan (COZAAR) 25 MG tablet Take 1 tablet by mouth Daily.   • metFORMIN (GLUCOPHAGE) 1000 MG tablet    • montelukast (SINGULAIR) 10 MG tablet Take 1 tablet by mouth Daily.   • nabumetone (RELAFEN) 500 MG tablet    • simvastatin (ZOCOR) 20 MG tablet Take 1 tablet by mouth Daily.   • sucralfate (CARAFATE) 1 g tablet Take 4 tablets by mouth  "Daily.     No current facility-administered medications on file prior to visit.      No Known Allergies    Objective   Vitals:    05/08/20 1314   BP: 117/74   BP Location: Left arm   Patient Position: Sitting   Cuff Size: Adult   Pulse: 85   Temp: 96.9 °F (36.1 °C)   TempSrc: Temporal   SpO2: 95%   Weight: 67.8 kg (149 lb 6.4 oz)   Height: 152.4 cm (60\")     Body mass index is 29.18 kg/m².    Physical Exam   Constitutional: She is oriented to person, place, and time. She appears well-developed and well-nourished.   HENT:   Head: Normocephalic and atraumatic.   Nose: Nose normal.   Eyes: Pupils are equal, round, and reactive to light. Conjunctivae and lids are normal.   Neck: Trachea normal. No thyromegaly present.   Cardiovascular: Normal rate, regular rhythm and normal heart sounds.   Pulmonary/Chest: Effort normal and breath sounds normal. No respiratory distress.   Neurological: She is alert and oriented to person, place, and time. She has normal strength. GCS eye subscore is 4. GCS verbal subscore is 5. GCS motor subscore is 6.   Skin: Skin is warm and dry.   Psychiatric: She has a normal mood and affect. Her speech is normal and behavior is normal. Thought content normal. Cognition and memory are normal.   Vitals reviewed.      Assessment/Plan   Mallika was seen today for establish care.    Diagnoses and all orders for this visit:    Type 2 diabetes mellitus without complication, without long-term current use of insulin (CMS/[facility])  Chronic issue stable requiring medication management and monitoring. Will eat well balanced heart healthy diabetic diet, drink adequate water, increase physical activity, and get adequate rest. Will monitor blood sugars daily and keep log for next appt. Will contact office earlier if blood sugars are averaging above 250.   -     glucose blood (Glucose Meter Test) test strip; Use as instructed to check blood glucose levels 3 times daily  -     glucose monitor monitoring kit; 1 each As " Needed (Check blood sugars 3 times daily PRN.).  -     Lancets misc; Use as instructed to test blood glucose levels 3 times daily.  -     CBC Auto Differential  -     Comprehensive Metabolic Panel  -     Hemoglobin A1c    Essential hypertension  Chronic issue unstable requiring medication management and monitoring. Will eat well balanced heart healthy diet, drink adequate water, increase physical activity, and get adequate rest. Monitor blood pressures daily and contact office for any readings consistently above 140/90. Patient will report any associated symptoms such as headaches, blurry vision, or nausea. Patient will go to ER for any chest pressure or chest pain.   -     CBC Auto Differential  -     Comprehensive Metabolic Panel  -     Lipid Panel    Hyperlipidemia, unspecified hyperlipidemia type  Chronic stable requiring medication management, lifestyle changes, and monitoring. Discussed how this being unstable increases risk of cardiovascular disease and adverse events. Will follow a hearth healthy diet low in cholesterol and fat. Discussed increasing fiber intake. Suggested fish oil or omega 3 supplement of 1200mg twice daily. Educated on how these help lower triglycerides and LDL. Will drink adequate water and increase physical activity as tolerated. Discussed importance of medication compliance.  -     Lipid Panel    Gastroesophageal reflux disease without esophagitis  Chronic issue unstable requiring medication management and monitoring. Will eat well balanced diet refraining from spicy and acidic foods, increase water intake refraining from soda/caffeine and alcohol, increase physical activity, and get adequate rest.   -     pantoprazole (PROTONIX) 40 MG EC tablet; Take 1 tablet by mouth Daily.    Primary insomnia  Chronic issue unstable requiring medication management and monitoring. Will eat well balanced diet, drink adequate water decreasing caffeine intake, and increase physical activity during the  day. Discussed relaxation and coping skills and exercises. Discussed sleep hygiene and limiting electronic devices prior to bedtime.    -     traZODone (DESYREL) 50 MG tablet; Take 1-2 tablets one hour before bedtime as needed for sleep.    Moderate episode of recurrent major depressive disorder (CMS/HCC)  Chronic issue unstable requiring medication management and monitoring. Will eat well balanced diet, increase water intake, increase physical activity during the day, and get adequate rest. Discussed relaxation and coping skills and exercises.  Continue Lexapro.    Anxiety  Chronic issue unstable requiring medication management and monitoring. Will eat well balanced diet, increase water intake, increase physical activity during the day, and get adequate rest. Discussed relaxation and coping skills and exercises.  Continue Lexapro and hydroxyzine    Environmental and seasonal allergies  Chronic issue stable requiring medication management. Suggested coolmist humidifier at home especially at bedtime to help with congestion and breathing.  Will eat a well-balanced diet, increase water intake, and get adequate rest.   Continue Singulair.  Suggested if she has any worsening of symptoms to start a Claritin or Zyrtec in the morning.    Irritable bowel syndrome with constipation  Chronic condition stable requiring medication management and monitoring. Will eat well balanced heart healthy diet high in fiber, decrease processed foods, increase water intake, increase physical activity as tolerated, and get adequate rest. Will follow back up if having any worsening of abdominal bloating, pain, or no bowel movement after 3 days.   Continue Linzess.    Menopausal syndrome  Chronic issue requiring further work-up to determine if medication management is needed.  She is agreeable to getting updated mammogram.  -     Estrogens, Total    Thyroid disorder screening  -     TSH Rfx On Abnormal To Free T4    Encounter for vitamin  deficiency screening  -     Vitamin B12 & Folate  -     Vitamin D 25 Hydroxy    Screening for breast cancer  -     Mammo Screening Digital Tomosynthesis Bilateral With CAD; Future           Current Outpatient Medications:   •  escitalopram (LEXAPRO) 10 MG tablet, Take 10 mg by mouth Daily., Disp: , Rfl:   •  linaclotide (LINZESS) 145 MCG capsule capsule, Take 145 mcg by mouth Daily., Disp: , Rfl:   •  glucose blood (Glucose Meter Test) test strip, Use as instructed to check blood glucose levels 3 times daily, Disp: 100 each, Rfl: 5  •  glucose monitor monitoring kit, 1 each As Needed (Check blood sugars 3 times daily PRN.)., Disp: 1 each, Rfl: 0  •  Lancets misc, Use as instructed to test blood glucose levels 3 times daily., Disp: 100 each, Rfl: 5  •  losartan (COZAAR) 25 MG tablet, Take 1 tablet by mouth Daily., Disp: , Rfl:   •  metFORMIN (GLUCOPHAGE) 1000 MG tablet, , Disp: , Rfl:   •  montelukast (SINGULAIR) 10 MG tablet, Take 1 tablet by mouth Daily., Disp: , Rfl:   •  nabumetone (RELAFEN) 500 MG tablet, , Disp: , Rfl:   •  pantoprazole (PROTONIX) 40 MG EC tablet, Take 1 tablet by mouth Daily., Disp: 90 tablet, Rfl: 3  •  simvastatin (ZOCOR) 20 MG tablet, Take 1 tablet by mouth Daily., Disp: , Rfl:   •  sucralfate (CARAFATE) 1 g tablet, Take 4 tablets by mouth Daily., Disp: , Rfl:   •  traZODone (DESYREL) 50 MG tablet, Take 1-2 tablets one hour before bedtime as needed for sleep., Disp: 45 tablet, Rfl: 1  •  vitamin D (ERGOCALCIFEROL) 1.25 MG (78394 UT) capsule capsule, Take 1 capsule by mouth 1 (One) Time Per Week., Disp: 12 capsule, Rfl: 2       Plan of care reviewed with the patient at the conclusion of today's visit.  Education was provided regarding diagnosis, management, and any prescribed or recommended OTC medications.  Patient verbalized understanding of and agreement with management plan.     Return in about 1 month (around 6/8/2020), or if symptoms worsen or fail to improve.      Amanda Ojeda  BHARAT Lagos    Please note that portions of this note were completed with a voice recognition program. Efforts were made to edit the dictations, but occasionally words are mistranscribed.

## 2020-05-09 LAB
25(OH)D3 SERPL-MCNC: 21 NG/ML (ref 30–100)
FOLATE SERPL-MCNC: 10.4 NG/ML (ref 4.78–24.2)
TSH SERPL DL<=0.05 MIU/L-ACNC: 0.34 UIU/ML (ref 0.27–4.2)
VIT B12 BLD-MCNC: 478 PG/ML (ref 211–946)

## 2020-05-12 RX ORDER — ERGOCALCIFEROL 1.25 MG/1
50000 CAPSULE ORAL WEEKLY
Qty: 12 CAPSULE | Refills: 2 | Status: SHIPPED | OUTPATIENT
Start: 2020-05-12 | End: 2020-08-13 | Stop reason: SDUPTHER

## 2020-05-12 NOTE — PROGRESS NOTES
My chart message:    Your vitamin D is low and this can contribute to fatigue, depression, and bone loss. I am calling in a once weekly vitamin D supplement to start. If insurance does not pay for this then you can start Vitamin D over the counter 2,000 IUs daily instead. Your triglycerides are very elevated and so is your liver enzymes. Have you had any further testing on your liver before? I think this is most likely due to fatty liver disease which is from high triglycerides. Your blood sugars are a bit elevated. I want you to follow a strict low cholesterol/fat diabetic diet.  How long have you been on zocor?

## 2020-05-13 ENCOUNTER — TELEPHONE (OUTPATIENT)
Dept: INTERNAL MEDICINE | Facility: CLINIC | Age: 55
End: 2020-05-13

## 2020-05-13 LAB — ESTROGEN SERPL-MCNC: 94 PG/ML

## 2020-05-13 NOTE — TELEPHONE ENCOUNTER
PT IS REQUESTING A CALL BACK ABOUT  HER A1C LEVEL. SHE KNOWS  IT IS ELEVATED BUT SHE WANTS TO KNOW IF SHE NEEDS TO INCREASE HER MEDICATION OR CHANGE IT     PT CALL BACK [phone]

## 2020-05-26 ENCOUNTER — OFFICE VISIT (OUTPATIENT)
Dept: INTERNAL MEDICINE | Facility: CLINIC | Age: 55
End: 2020-05-26

## 2020-05-26 VITALS
TEMPERATURE: 97.8 F | DIASTOLIC BLOOD PRESSURE: 70 MMHG | HEIGHT: 60 IN | BODY MASS INDEX: 29.06 KG/M2 | HEART RATE: 72 BPM | SYSTOLIC BLOOD PRESSURE: 120 MMHG | WEIGHT: 148 LBS

## 2020-05-26 DIAGNOSIS — E11.9 TYPE 2 DIABETES MELLITUS WITHOUT COMPLICATION, WITH LONG-TERM CURRENT USE OF INSULIN (HCC): Primary | ICD-10-CM

## 2020-05-26 DIAGNOSIS — K21.9 GASTROESOPHAGEAL REFLUX DISEASE WITHOUT ESOPHAGITIS: ICD-10-CM

## 2020-05-26 DIAGNOSIS — Z79.4 TYPE 2 DIABETES MELLITUS WITHOUT COMPLICATION, WITH LONG-TERM CURRENT USE OF INSULIN (HCC): Primary | ICD-10-CM

## 2020-05-26 DIAGNOSIS — R07.81 RIB PAIN ON LEFT SIDE: ICD-10-CM

## 2020-05-26 PROCEDURE — 99214 OFFICE O/P EST MOD 30 MIN: CPT | Performed by: NURSE PRACTITIONER

## 2020-05-31 PROBLEM — E78.5 HYPERLIPIDEMIA: Status: ACTIVE | Noted: 2020-05-31

## 2020-05-31 PROBLEM — J30.89 ENVIRONMENTAL AND SEASONAL ALLERGIES: Status: ACTIVE | Noted: 2020-05-31

## 2020-05-31 PROBLEM — F51.01 PRIMARY INSOMNIA: Status: ACTIVE | Noted: 2020-05-31

## 2020-05-31 PROBLEM — F41.9 ANXIETY: Status: ACTIVE | Noted: 2020-05-31

## 2020-05-31 PROBLEM — E11.9 TYPE 2 DIABETES MELLITUS WITHOUT COMPLICATION, WITHOUT LONG-TERM CURRENT USE OF INSULIN (HCC): Status: ACTIVE | Noted: 2020-05-31

## 2020-05-31 PROBLEM — I10 ESSENTIAL HYPERTENSION: Status: ACTIVE | Noted: 2020-05-31

## 2020-05-31 PROBLEM — N95.1 MENOPAUSAL SYNDROME: Status: ACTIVE | Noted: 2020-05-31

## 2020-05-31 PROBLEM — K58.1 IRRITABLE BOWEL SYNDROME WITH CONSTIPATION: Status: ACTIVE | Noted: 2020-05-31

## 2020-05-31 PROBLEM — F33.1 MODERATE EPISODE OF RECURRENT MAJOR DEPRESSIVE DISORDER (HCC): Status: ACTIVE | Noted: 2020-05-31

## 2020-05-31 PROBLEM — K21.9 GASTROESOPHAGEAL REFLUX DISEASE WITHOUT ESOPHAGITIS: Status: ACTIVE | Noted: 2020-05-31

## 2020-06-09 ENCOUNTER — APPOINTMENT (OUTPATIENT)
Dept: OTHER | Facility: HOSPITAL | Age: 55
End: 2020-06-09

## 2020-06-09 ENCOUNTER — HOSPITAL ENCOUNTER (OUTPATIENT)
Dept: MAMMOGRAPHY | Facility: HOSPITAL | Age: 55
Discharge: HOME | End: 2020-06-09
Admitting: NURSE PRACTITIONER

## 2020-06-09 ENCOUNTER — OFFICE VISIT (OUTPATIENT)
Dept: INTERNAL MEDICINE | Facility: CLINIC | Age: 55
End: 2020-06-09

## 2020-06-09 VITALS
SYSTOLIC BLOOD PRESSURE: 116 MMHG | BODY MASS INDEX: 29.45 KG/M2 | DIASTOLIC BLOOD PRESSURE: 74 MMHG | HEART RATE: 79 BPM | OXYGEN SATURATION: 98 % | RESPIRATION RATE: 14 BRPM | HEIGHT: 60 IN | TEMPERATURE: 97.7 F | WEIGHT: 150 LBS

## 2020-06-09 DIAGNOSIS — E11.9 TYPE 2 DIABETES MELLITUS WITHOUT COMPLICATION, WITH LONG-TERM CURRENT USE OF INSULIN (HCC): Primary | ICD-10-CM

## 2020-06-09 DIAGNOSIS — Z12.39 SCREENING FOR BREAST CANCER: ICD-10-CM

## 2020-06-09 DIAGNOSIS — I10 ESSENTIAL HYPERTENSION: ICD-10-CM

## 2020-06-09 DIAGNOSIS — Z79.4 TYPE 2 DIABETES MELLITUS WITHOUT COMPLICATION, WITH LONG-TERM CURRENT USE OF INSULIN (HCC): Primary | ICD-10-CM

## 2020-06-09 PROCEDURE — 77063 BREAST TOMOSYNTHESIS BI: CPT

## 2020-06-09 PROCEDURE — 77067 SCR MAMMO BI INCL CAD: CPT | Performed by: RADIOLOGY

## 2020-06-09 PROCEDURE — 77063 BREAST TOMOSYNTHESIS BI: CPT | Performed by: RADIOLOGY

## 2020-06-09 PROCEDURE — 99214 OFFICE O/P EST MOD 30 MIN: CPT | Performed by: NURSE PRACTITIONER

## 2020-06-09 PROCEDURE — 77067 SCR MAMMO BI INCL CAD: CPT
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
## 2020-07-07 ENCOUNTER — OFFICE VISIT (OUTPATIENT)
Dept: INTERNAL MEDICINE | Facility: CLINIC | Age: 55
End: 2020-07-07

## 2020-07-07 VITALS
OXYGEN SATURATION: 98 % | BODY MASS INDEX: 28.98 KG/M2 | SYSTOLIC BLOOD PRESSURE: 122 MMHG | HEIGHT: 60 IN | DIASTOLIC BLOOD PRESSURE: 78 MMHG | WEIGHT: 147.6 LBS | HEART RATE: 93 BPM | TEMPERATURE: 97.3 F

## 2020-07-07 DIAGNOSIS — F41.9 ANXIETY: ICD-10-CM

## 2020-07-07 DIAGNOSIS — F33.1 MODERATE EPISODE OF RECURRENT MAJOR DEPRESSIVE DISORDER (HCC): Primary | ICD-10-CM

## 2020-07-07 DIAGNOSIS — F51.01 PRIMARY INSOMNIA: ICD-10-CM

## 2020-07-07 PROCEDURE — 99214 OFFICE O/P EST MOD 30 MIN: CPT | Performed by: NURSE PRACTITIONER

## 2020-07-07 RX ORDER — FLUOXETINE HYDROCHLORIDE 20 MG/1
20 CAPSULE ORAL DAILY
Qty: 30 CAPSULE | Refills: 2 | Status: SHIPPED | OUTPATIENT
Start: 2020-07-07 | End: 2020-07-30 | Stop reason: SDUPTHER

## 2020-07-07 RX ORDER — BUPROPION HYDROCHLORIDE 150 MG/1
150 TABLET ORAL DAILY
Qty: 30 TABLET | Refills: 2 | Status: SHIPPED | OUTPATIENT
Start: 2020-07-07 | End: 2020-07-30 | Stop reason: SDUPTHER

## 2020-07-07 RX ORDER — QUETIAPINE FUMARATE 25 MG/1
TABLET, FILM COATED ORAL
Qty: 45 TABLET | Refills: 2 | Status: SHIPPED | OUTPATIENT
Start: 2020-07-07 | End: 2020-07-13 | Stop reason: SDUPTHER

## 2020-07-07 NOTE — PROGRESS NOTES
Subjective   Chief Complaint   Patient presents with   • Follow-up     4wk recheck      Mallika Brewster is a 55 y.o. female here today for follow up depression, anxiety, and insomnia. Lexapro has not been helping with anxiety and depression. She has been struggling with symptoms. Family situation with son is making symptoms worse. She is not sleeping well with trazodone. She has difficult time falling asleep and wakes up frequently throughout the night.  No thoughts of self-harm or harming others.  No shortness of breath or chest pain.    I have reviewed the following portions of the patient's history and confirmed they are accurate: allergies, current medications, past family history, past medical history, past social history, past surgical history and problem list    I have personally completed the patient's review of systems.    Review of Systems   Constitutional: Positive for fatigue. Negative for activity change, appetite change, chills, diaphoresis, fever, unexpected weight gain and unexpected weight loss.   HENT: Negative for congestion, ear discharge, ear pain, mouth sores, nosebleeds, sinus pressure, sneezing and sore throat.    Eyes: Negative for pain, discharge and itching.   Respiratory: Negative for cough, chest tightness, shortness of breath and wheezing.    Cardiovascular: Negative for chest pain, palpitations and leg swelling.   Gastrointestinal: Negative for abdominal pain, constipation, diarrhea, nausea, vomiting, GERD and indigestion.        Colonoscopy - 2017 - normal   Endocrine: Negative for heat intolerance, polydipsia, polyphagia and polyuria.   Genitourinary: Negative for dysuria, flank pain, frequency, hematuria and urgency.   Musculoskeletal: Positive for arthralgias and back pain. Negative for gait problem, joint swelling, myalgias, neck pain and neck stiffness.   Skin: Negative for color change, pallor and rash.   Allergic/Immunologic: Negative for environmental allergies and  "immunocompromised state.   Neurological: Negative for seizures, speech difficulty, weakness and numbness.   Hematological: Negative for adenopathy.   Psychiatric/Behavioral: Positive for sleep disturbance and depressed mood. Negative for agitation, decreased concentration and suicidal ideas. The patient is nervous/anxious.        Current Outpatient Medications on File Prior to Visit   Medication Sig   • glucose blood (Glucose Meter Test) test strip Use as instructed to check blood glucose levels 3 times daily   • glucose monitor monitoring kit 1 each As Needed (Check blood sugars 3 times daily PRN.).   • Lancets misc Use as instructed to test blood glucose levels 3 times daily.   • linaclotide (LINZESS) 145 MCG capsule capsule Take 145 mcg by mouth Daily.   • losartan (COZAAR) 25 MG tablet Take 1 tablet by mouth Daily.   • metFORMIN (GLUCOPHAGE) 1000 MG tablet    • montelukast (SINGULAIR) 10 MG tablet Take 1 tablet by mouth Daily.   • nabumetone (RELAFEN) 500 MG tablet    • pantoprazole (PROTONIX) 40 MG EC tablet Take 1 tablet by mouth Daily.   • simvastatin (ZOCOR) 20 MG tablet Take 1 tablet by mouth Daily.   • SITagliptin (JANUVIA) 50 MG tablet Take 1 tablet by mouth Daily.   • sucralfate (CARAFATE) 1 g tablet Take 4 tablets by mouth Daily.   • vitamin D (ERGOCALCIFEROL) 1.25 MG (84500 UT) capsule capsule Take 1 capsule by mouth 1 (One) Time Per Week.     No current facility-administered medications on file prior to visit.        Objective   Vitals:    07/07/20 0802   BP: 122/78   BP Location: Left arm   Patient Position: Sitting   Cuff Size: Adult   Pulse: 93   Temp: 97.3 °F (36.3 °C)   TempSrc: Temporal   SpO2: 98%   Weight: 67 kg (147 lb 9.6 oz)   Height: 152.4 cm (60\")     Body mass index is 28.83 kg/m².    Physical Exam   Constitutional: She is oriented to person, place, and time. She appears well-developed and well-nourished.   HENT:   Head: Normocephalic and atraumatic.   Nose: Nose normal.   Eyes: Pupils " are equal, round, and reactive to light. Conjunctivae and lids are normal.   Neck: Trachea normal. No thyromegaly present.   Cardiovascular: Normal rate, regular rhythm and normal heart sounds.   Pulmonary/Chest: Effort normal and breath sounds normal. No respiratory distress.   Neurological: She is alert and oriented to person, place, and time. She has normal strength. GCS eye subscore is 4. GCS verbal subscore is 5. GCS motor subscore is 6.   Skin: Skin is warm and dry.   Psychiatric: Her speech is normal and behavior is normal. Thought content normal. Her mood appears anxious. Cognition and memory are normal. She exhibits a depressed mood.   Vitals reviewed.      Assessment/Plan   Mallika was seen today for follow-up.    Diagnoses and all orders for this visit:    Moderate episode of recurrent major depressive disorder (CMS/HCC)  Chronic issue unstable requiring medication management and monitoring. Will eat well balanced diet, increase water intake, increase physical activity during the day, and get adequate rest. Discussed relaxation and coping skills and exercises. Suggested self referral to behavioral therapist.  -     FLUoxetine (PROzac) 20 MG capsule; Take 1 capsule by mouth Daily.  -     buPROPion XL (WELLBUTRIN XL) 150 MG 24 hr tablet; Take 1 tablet by mouth Daily.    Anxiety  Chronic issue unstable requiring medication management and monitoring. Will eat well balanced diet, increase water intake, increase physical activity during the day, and get adequate rest. Discussed relaxation and coping skills and exercises.   -     FLUoxetine (PROzac) 20 MG capsule; Take 1 capsule by mouth Daily.  -     buPROPion XL (WELLBUTRIN XL) 150 MG 24 hr tablet; Take 1 tablet by mouth Daily.    Primary insomnia  Chronic issue unstable requiring medication management and monitoring. Will eat well balanced diet, drink adequate water decreasing caffeine intake, and increase physical activity during the day. Discussed relaxation  and coping skills and exercises. Discussed sleep hygiene and limiting electronic devices prior to bedtime.    -     QUEtiapine (SEROquel) 25 MG tablet; Take 1-2 tablets 30 minutes before bedtime as needed for sleep.           Current Outpatient Medications:   •  buPROPion XL (WELLBUTRIN XL) 150 MG 24 hr tablet, Take 1 tablet by mouth Daily., Disp: 30 tablet, Rfl: 2  •  FLUoxetine (PROzac) 20 MG capsule, Take 1 capsule by mouth Daily., Disp: 30 capsule, Rfl: 2  •  glucose blood (Glucose Meter Test) test strip, Use as instructed to check blood glucose levels 3 times daily, Disp: 100 each, Rfl: 5  •  glucose monitor monitoring kit, 1 each As Needed (Check blood sugars 3 times daily PRN.)., Disp: 1 each, Rfl: 0  •  Lancets misc, Use as instructed to test blood glucose levels 3 times daily., Disp: 100 each, Rfl: 5  •  linaclotide (LINZESS) 145 MCG capsule capsule, Take 145 mcg by mouth Daily., Disp: , Rfl:   •  losartan (COZAAR) 25 MG tablet, Take 1 tablet by mouth Daily., Disp: , Rfl:   •  metFORMIN (GLUCOPHAGE) 1000 MG tablet, , Disp: , Rfl:   •  montelukast (SINGULAIR) 10 MG tablet, Take 1 tablet by mouth Daily., Disp: , Rfl:   •  nabumetone (RELAFEN) 500 MG tablet, , Disp: , Rfl:   •  pantoprazole (PROTONIX) 40 MG EC tablet, Take 1 tablet by mouth Daily., Disp: 90 tablet, Rfl: 3  •  QUEtiapine (SEROquel) 25 MG tablet, Take 1-2 tablets 30 minutes before bedtime as needed for sleep., Disp: 180 tablet, Rfl: 2  •  simvastatin (ZOCOR) 20 MG tablet, Take 1 tablet by mouth Daily., Disp: , Rfl:   •  SITagliptin (JANUVIA) 50 MG tablet, Take 1 tablet by mouth Daily., Disp: 90 tablet, Rfl: 0  •  sucralfate (CARAFATE) 1 g tablet, Take 4 tablets by mouth Daily., Disp: , Rfl:   •  vitamin D (ERGOCALCIFEROL) 1.25 MG (33357 UT) capsule capsule, Take 1 capsule by mouth 1 (One) Time Per Week., Disp: 12 capsule, Rfl: 2       Plan of care reviewed with the patient at the conclusion of today's visit.  Education was provided regarding  diagnosis, management, and any prescribed or recommended OTC medications.  Patient verbalized understanding of and agreement with management plan.     Return in about 2 weeks (around 7/21/2020), or if symptoms worsen or fail to improve, for telehealth. .      BHARAT Alonzo    Please note that portions of this note were completed with a voice recognition program. Efforts were made to edit the dictations, but occasionally words are mistranscribed.

## 2020-07-13 ENCOUNTER — TELEPHONE (OUTPATIENT)
Dept: INTERNAL MEDICINE | Facility: CLINIC | Age: 55
End: 2020-07-13

## 2020-07-13 RX ORDER — QUETIAPINE FUMARATE 25 MG/1
TABLET, FILM COATED ORAL
Qty: 180 TABLET | Refills: 2 | Status: SHIPPED | OUTPATIENT
Start: 2020-07-13 | End: 2021-02-17

## 2020-07-13 NOTE — TELEPHONE ENCOUNTER
PT CALLED STATED THAT [facility] IS NEEDING INFORMATION TO REFILL RX FOR PT THAT HELPS HER SLEEP, PT NOT SURE THE NAME OF RX.    PLEASE ADVISE.  CALL BACK:[phone]     [facility] - [facility] [address] [place] [address]

## 2020-07-27 ENCOUNTER — HOSPITAL ENCOUNTER (OUTPATIENT)
Dept: GENERAL RADIOLOGY | Facility: HOSPITAL | Age: 55
Discharge: HOME | End: 2020-07-27
Admitting: NURSE PRACTITIONER

## 2020-07-27 ENCOUNTER — OFFICE VISIT (OUTPATIENT)
Dept: INTERNAL MEDICINE | Facility: CLINIC | Age: 55
End: 2020-07-27

## 2020-07-27 VITALS
WEIGHT: 151 LBS | HEART RATE: 81 BPM | RESPIRATION RATE: 16 BRPM | HEIGHT: 60 IN | BODY MASS INDEX: 29.64 KG/M2 | OXYGEN SATURATION: 98 % | TEMPERATURE: 97.1 F | SYSTOLIC BLOOD PRESSURE: 122 MMHG | DIASTOLIC BLOOD PRESSURE: 78 MMHG

## 2020-07-27 DIAGNOSIS — M25.532 LEFT WRIST PAIN: ICD-10-CM

## 2020-07-27 DIAGNOSIS — M79.642 LEFT HAND PAIN: ICD-10-CM

## 2020-07-27 DIAGNOSIS — R22.41 LOCALIZED SWELLING OF RIGHT FOOT: ICD-10-CM

## 2020-07-27 DIAGNOSIS — M65.332 TRIGGER MIDDLE FINGER OF LEFT HAND: ICD-10-CM

## 2020-07-27 DIAGNOSIS — M79.671 RIGHT FOOT PAIN: ICD-10-CM

## 2020-07-27 DIAGNOSIS — M79.671 RIGHT FOOT PAIN: Primary | ICD-10-CM

## 2020-07-27 PROCEDURE — 73630 X-RAY EXAM OF FOOT: CPT

## 2020-07-27 PROCEDURE — 73130 X-RAY EXAM OF HAND: CPT

## 2020-07-27 PROCEDURE — 73110 X-RAY EXAM OF WRIST: CPT

## 2020-07-27 PROCEDURE — 99214 OFFICE O/P EST MOD 30 MIN: CPT | Performed by: NURSE PRACTITIONER

## 2020-07-27 NOTE — PROGRESS NOTES
Subjective   Chief Complaint   Patient presents with   • Foot Swelling      right x 6 days   • Foot Pain     right      Mallika Brewster is a 55 y.o. female here today for right foot pain and left hand pain.  She has 1 week of right foot pain and swelling.  Swelling is localized to the dorsal lateral area.  No recent injuries.  No bruising or discoloration.  She reports this happened before several years ago and swelling and pain resolved on its own.  She is having difficulty walking due to her pain.  She has recurrent left hand and wrist pain with middle trigger finger that's become a chronic issue. Had similar symptoms with right hand and had tendon release procedure on fingers and carpal tunnel surgery.     I have reviewed the following portions of the patient's history and confirmed they are accurate: allergies, current medications, past family history, past medical history, past social history, past surgical history and problem list    I have personally completed the patient's review of systems.    Review of Systems   Constitutional: Positive for fatigue. Negative for activity change, appetite change, chills, diaphoresis, fever, unexpected weight gain and unexpected weight loss.   HENT: Negative for congestion, ear discharge, ear pain, mouth sores, nosebleeds, sinus pressure, sneezing and sore throat.    Eyes: Negative for pain, discharge and itching.   Respiratory: Negative for cough, chest tightness, shortness of breath and wheezing.    Cardiovascular: Negative for chest pain, palpitations and leg swelling.   Gastrointestinal: Negative for abdominal pain, constipation, diarrhea, nausea, vomiting, GERD and indigestion.        Colonoscopy - 2017 - normal   Endocrine: Negative for heat intolerance, polydipsia, polyphagia and polyuria.   Genitourinary: Negative for dysuria, flank pain, frequency, hematuria and urgency.   Musculoskeletal: Positive for arthralgias, back pain, gait problem, joint swelling and  myalgias. Negative for neck pain and neck stiffness.   Skin: Negative for color change, pallor and rash.   Allergic/Immunologic: Negative for environmental allergies and immunocompromised state.   Neurological: Negative for seizures, speech difficulty, weakness and numbness.   Hematological: Negative for adenopathy.   Psychiatric/Behavioral: Positive for sleep disturbance and depressed mood. Negative for agitation, decreased concentration and suicidal ideas. The patient is nervous/anxious.        Current Outpatient Medications on File Prior to Visit   Medication Sig   • buPROPion XL (WELLBUTRIN XL) 150 MG 24 hr tablet Take 1 tablet by mouth Daily.   • FLUoxetine (PROzac) 20 MG capsule Take 1 capsule by mouth Daily.   • glucose blood (Glucose Meter Test) test strip Use as instructed to check blood glucose levels 3 times daily   • glucose monitor monitoring kit 1 each As Needed (Check blood sugars 3 times daily PRN.).   • Lancets misc Use as instructed to test blood glucose levels 3 times daily.   • linaclotide (LINZESS) 145 MCG capsule capsule Take 145 mcg by mouth Daily.   • losartan (COZAAR) 25 MG tablet Take 1 tablet by mouth Daily.   • metFORMIN (GLUCOPHAGE) 1000 MG tablet    • montelukast (SINGULAIR) 10 MG tablet Take 1 tablet by mouth Daily.   • nabumetone (RELAFEN) 500 MG tablet    • pantoprazole (PROTONIX) 40 MG EC tablet Take 1 tablet by mouth Daily.   • QUEtiapine (SEROquel) 25 MG tablet Take 1-2 tablets 30 minutes before bedtime as needed for sleep.   • simvastatin (ZOCOR) 20 MG tablet Take 1 tablet by mouth Daily.   • SITagliptin (JANUVIA) 50 MG tablet Take 1 tablet by mouth Daily.   • sucralfate (CARAFATE) 1 g tablet Take 4 tablets by mouth Daily.   • vitamin D (ERGOCALCIFEROL) 1.25 MG (90832 UT) capsule capsule Take 1 capsule by mouth 1 (One) Time Per Week.     No current facility-administered medications on file prior to visit.        Objective   Vitals:    07/27/20 1457   BP: 122/78   Pulse: 81  "  Resp: 16   Temp: 97.1 °F (36.2 °C)   TempSrc: Temporal   SpO2: 98%   Weight: 68.5 kg (151 lb)   Height: 152.4 cm (60\")   PainSc:   6   PainLoc: Foot     Body mass index is 29.49 kg/m².    Physical Exam   Constitutional: She is oriented to person, place, and time. She appears well-developed and well-nourished.   HENT:   Head: Normocephalic and atraumatic.   Nose: Nose normal.   Eyes: Pupils are equal, round, and reactive to light. Conjunctivae and lids are normal.   Neck: Trachea normal. No thyromegaly present.   Pulmonary/Chest: Effort normal. No respiratory distress.   Musculoskeletal:        Left wrist: She exhibits tenderness.        Left hand: She exhibits tenderness.        Right foot: There is tenderness and swelling.   Left middle trigger finger.         Neurological: She is alert and oriented to person, place, and time. She has normal strength. GCS eye subscore is 4. GCS verbal subscore is 5. GCS motor subscore is 6.   Skin: Skin is warm and dry.   Psychiatric: She has a normal mood and affect. Her speech is normal and behavior is normal.   Vitals reviewed.      Assessment/Plan   Problem List Items Addressed This Visit     None      Visit Diagnoses     Right foot pain    -  Primary  New onset issue unstable requiring further work up and monitoring. Will eat well balanced diet, increase water intake, increase physical activity as tolerated, and get adequate rest. Can use warmth or ice for discomfort in this area. Discussed resting joint and can use supportive wrap.    Discussed possible steroid pack after x-ray is resulted.    Relevant Orders    XR Foot 3+ View Right    Left hand pain      Chronic issue unstable requiring medication management and monitoring. Will eat well balanced diet, increase water intake, increase physical activity as tolerated, and get adequate rest. Can use warmth or ice for discomfort in this area. Discussed stretching exercises.   Continue Relafen    Relevant Orders    XR Hand 3+ " View Left    Left wrist pain      Chronic issue unstable requiring medication management and monitoring. Will eat well balanced diet, increase water intake, increase physical activity as tolerated, and get adequate rest. Can use warmth or ice for discomfort in this area. Discussed stretching exercises.   Continue Relafen.    Relevant Orders    XR Wrist 3+ View Left    Localized swelling of right foot      New onset issue unstable requiring further work up and monitoring. Will eat well balanced diet, increase water intake, increase physical activity as tolerated, and get adequate rest. Can use warmth or ice for discomfort in this area. Discussed resting joint and can use supportive wrap.    Discussed possible steroid pack after x-ray is resulted.    Relevant Orders    XR Foot 3+ View Right    Trigger middle finger of left hand      Chronic issue unstable requiring medication management and monitoring. Will eat well balanced diet, increase water intake, increase physical activity as tolerated, and get adequate rest. Can use warmth or ice for discomfort in this area. Discussed stretching exercises.     Relevant Orders    XR Hand 3+ View Left             Current Outpatient Medications:   •  buPROPion XL (WELLBUTRIN XL) 150 MG 24 hr tablet, Take 1 tablet by mouth Daily., Disp: 30 tablet, Rfl: 2  •  FLUoxetine (PROzac) 20 MG capsule, Take 1 capsule by mouth Daily., Disp: 30 capsule, Rfl: 2  •  glucose blood (Glucose Meter Test) test strip, Use as instructed to check blood glucose levels 3 times daily, Disp: 100 each, Rfl: 5  •  glucose monitor monitoring kit, 1 each As Needed (Check blood sugars 3 times daily PRN.)., Disp: 1 each, Rfl: 0  •  Lancets misc, Use as instructed to test blood glucose levels 3 times daily., Disp: 100 each, Rfl: 5  •  linaclotide (LINZESS) 145 MCG capsule capsule, Take 145 mcg by mouth Daily., Disp: , Rfl:   •  losartan (COZAAR) 25 MG tablet, Take 1 tablet by mouth Daily., Disp: , Rfl:   •   metFORMIN (GLUCOPHAGE) 1000 MG tablet, , Disp: , Rfl:   •  montelukast (SINGULAIR) 10 MG tablet, Take 1 tablet by mouth Daily., Disp: , Rfl:   •  nabumetone (RELAFEN) 500 MG tablet, , Disp: , Rfl:   •  pantoprazole (PROTONIX) 40 MG EC tablet, Take 1 tablet by mouth Daily., Disp: 90 tablet, Rfl: 3  •  QUEtiapine (SEROquel) 25 MG tablet, Take 1-2 tablets 30 minutes before bedtime as needed for sleep., Disp: 180 tablet, Rfl: 2  •  simvastatin (ZOCOR) 20 MG tablet, Take 1 tablet by mouth Daily., Disp: , Rfl:   •  SITagliptin (JANUVIA) 50 MG tablet, Take 1 tablet by mouth Daily., Disp: 90 tablet, Rfl: 0  •  sucralfate (CARAFATE) 1 g tablet, Take 4 tablets by mouth Daily., Disp: , Rfl:   •  vitamin D (ERGOCALCIFEROL) 1.25 MG (30864 UT) capsule capsule, Take 1 capsule by mouth 1 (One) Time Per Week., Disp: 12 capsule, Rfl: 2       Plan of care reviewed with the patient at the conclusion of today's visit.  Education was provided regarding diagnosis, management, and any prescribed or recommended OTC medications.  Patient verbalized understanding of and agreement with management plan.     No follow-ups on file.      BHARAT Alonzo    Please note that portions of this note were completed with a voice recognition program. Efforts were made to edit the dictations, but occasionally words are mistranscribed.

## 2020-07-28 NOTE — PROGRESS NOTES
My chart message:    Wrist xray is normal. I am still waiting on other xray results. Let me know if you have any questions.

## 2020-07-30 ENCOUNTER — TELEPHONE (OUTPATIENT)
Dept: INTERNAL MEDICINE | Facility: CLINIC | Age: 55
End: 2020-07-30

## 2020-07-30 DIAGNOSIS — F33.1 MODERATE EPISODE OF RECURRENT MAJOR DEPRESSIVE DISORDER (HCC): ICD-10-CM

## 2020-07-30 DIAGNOSIS — F41.9 ANXIETY: ICD-10-CM

## 2020-07-30 RX ORDER — PREDNISONE 1 MG/1
TABLET ORAL
Qty: 21 TABLET | Refills: 0 | Status: SHIPPED | OUTPATIENT
Start: 2020-07-30 | End: 2020-09-08

## 2020-07-30 RX ORDER — FLUOXETINE HYDROCHLORIDE 20 MG/1
20 CAPSULE ORAL DAILY
Qty: 30 CAPSULE | Refills: 2 | Status: SHIPPED | OUTPATIENT
Start: 2020-07-30 | End: 2020-09-09 | Stop reason: SDUPTHER

## 2020-07-30 RX ORDER — BUPROPION HYDROCHLORIDE 150 MG/1
150 TABLET ORAL DAILY
Qty: 30 TABLET | Refills: 2 | Status: SHIPPED | OUTPATIENT
Start: 2020-07-30 | End: 2020-09-09 | Stop reason: SDUPTHER

## 2020-07-30 NOTE — TELEPHONE ENCOUNTER
Caller: garrick maldonado    Relationship: self     Best call back number: [phone]    Medication needed:   Requested Prescriptions     Pending Prescriptions Disp Refills   • buPROPion XL (WELLBUTRIN XL) 150 MG 24 hr tablet 30 tablet 2     Sig: Take 1 tablet by mouth Daily.   • FLUoxetine (PROzac) 20 MG capsule 30 capsule 2     Sig: Take 1 capsule by mouth Daily.       When do you need the refill by: 08/04/20    What details did the patient provide when requesting the medication: patient has a 5 day supply, however since [facility] patient is wondering if it can be called in right away    Does the patient have less than a 3 day supply:  [] Yes  [x] No    What is the patient's preferred pharmacy: [facility] - [address] [phone] [facility] [phone]

## 2020-07-30 NOTE — PROGRESS NOTES
My chart message:    Xray does not show any fractures but arthritis in the area of swelling. I am going to prescribe you a steroid pack to help with inflammation and pain. Let me know if this does not help.

## 2020-07-30 NOTE — TELEPHONE ENCOUNTER
PATIENT REQUESTING CALL BACK REGARDING  RESULTS OF XRAY RIGHT FOOT    PATIENT CALL BACK    [phone]

## 2020-08-03 ENCOUNTER — OFFICE VISIT (OUTPATIENT)
Dept: GASTROENTEROLOGY | Facility: CLINIC | Age: 55
End: 2020-08-03

## 2020-08-03 ENCOUNTER — TELEPHONE (OUTPATIENT)
Dept: INTERNAL MEDICINE | Facility: CLINIC | Age: 55
End: 2020-08-03

## 2020-08-03 VITALS
TEMPERATURE: 97.3 F | HEIGHT: 60 IN | BODY MASS INDEX: 29.17 KG/M2 | SYSTOLIC BLOOD PRESSURE: 128 MMHG | HEART RATE: 89 BPM | DIASTOLIC BLOOD PRESSURE: 88 MMHG | WEIGHT: 148.6 LBS

## 2020-08-03 DIAGNOSIS — R10.13 DYSPEPSIA: ICD-10-CM

## 2020-08-03 DIAGNOSIS — K21.9 GASTROESOPHAGEAL REFLUX DISEASE, ESOPHAGITIS PRESENCE NOT SPECIFIED: Primary | ICD-10-CM

## 2020-08-03 PROCEDURE — 99204 OFFICE O/P NEW MOD 45 MIN: CPT | Performed by: NURSE PRACTITIONER

## 2020-08-03 RX ORDER — ESOMEPRAZOLE MAGNESIUM 40 MG/1
40 CAPSULE, DELAYED RELEASE ORAL 2 TIMES DAILY
Qty: 60 CAPSULE | Refills: 11 | Status: SHIPPED | OUTPATIENT
Start: 2020-08-03 | End: 2020-09-09 | Stop reason: SDUPTHER

## 2020-08-03 NOTE — PROGRESS NOTES
GASTROENTEROLOGY OFFICE NOTE  Mallika Brewster  [id number]  1965    CARE TEAM  Patient Care Team:  Amanda Katz APRN as PCP - General (Nurse Practitioner)    Referring Provider: Amanda Katz APRN    Chief Complaint   Patient presents with   • Heartburn   • Bloated        HISTORY OF PRESENT ILLNESS:  Ms. Brewster is a 55-year-old female who presents today with complaints of chronic, worsening acid reflux.  She has been taking pantoprazole 40 mg daily for several years.  Over the last year or so she has developed a lot of burping and bloating and further complains of early satiety.  She has added over-the-counter Nexium as needed at nighttime for worsening reflux.  She reports she wakes up with severe heartburn most every morning.  She has arthritis and since May has been taken meloxicam for treatment.    Her last colonoscopy was on May 4, 2017, at the age of 52 done at [facility].  Colonoscopy was normal with recommendations to repeat again in 10 years.    PAST MEDICAL HISTORY  Past Medical History:   Diagnosis Date   • Acid reflux    • Arthritis    • Back pain    • Bronchitis    • Diabetes mellitus (CMS/HCC)    • High cholesterol    • Left hip pain         PAST SURGICAL HISTORY  Past Surgical History:   Procedure Laterality Date   • CHOLECYSTECTOMY     • HYSTERECTOMY     • OOPHORECTOMY     • SHOULDER ROTATOR CUFF REPAIR Left         MEDICATIONS:    Current Outpatient Medications:   •  buPROPion XL (WELLBUTRIN XL) 150 MG 24 hr tablet, Take 1 tablet by mouth Daily., Disp: 30 tablet, Rfl: 2  •  esomeprazole (nexIUM) 40 MG capsule, Take 1 capsule by mouth 2 (Two) Times a Day., Disp: 60 capsule, Rfl: 11  •  FLUoxetine (PROzac) 20 MG capsule, Take 1 capsule by mouth Daily., Disp: 30 capsule, Rfl: 2  •  glucose blood (Glucose Meter Test) test strip, Use as instructed to check blood glucose levels 3 times daily, Disp: 100 each, Rfl: 5  •  glucose monitor monitoring kit, 1 each As Needed  (Check blood sugars 3 times daily PRN.)., Disp: 1 each, Rfl: 0  •  Lancets misc, Use as instructed to test blood glucose levels 3 times daily., Disp: 100 each, Rfl: 5  •  linaclotide (LINZESS) 145 MCG capsule capsule, Take 145 mcg by mouth Daily., Disp: , Rfl:   •  losartan (COZAAR) 25 MG tablet, Take 1 tablet by mouth Daily., Disp: , Rfl:   •  metFORMIN (GLUCOPHAGE) 1000 MG tablet, , Disp: , Rfl:   •  montelukast (SINGULAIR) 10 MG tablet, Take 1 tablet by mouth Daily., Disp: , Rfl:   •  nabumetone (RELAFEN) 500 MG tablet, , Disp: , Rfl:   •  predniSONE (DELTASONE) 5 MG tablet, Take as directed on package instruction - 6 day taper., Disp: 21 tablet, Rfl: 0  •  QUEtiapine (SEROquel) 25 MG tablet, Take 1-2 tablets 30 minutes before bedtime as needed for sleep., Disp: 180 tablet, Rfl: 2  •  simvastatin (ZOCOR) 20 MG tablet, Take 1 tablet by mouth Daily., Disp: , Rfl:   •  SITagliptin (JANUVIA) 50 MG tablet, Take 1 tablet by mouth Daily., Disp: 90 tablet, Rfl: 0  •  sucralfate (CARAFATE) 1 g tablet, Take 4 tablets by mouth Daily., Disp: , Rfl:   •  vitamin D (ERGOCALCIFEROL) 1.25 MG (12623 UT) capsule capsule, Take 1 capsule by mouth 1 (One) Time Per Week., Disp: 12 capsule, Rfl: 2    ALLERGIES  No Known Allergies    FAMILY HISTORY:  Family History   Adopted: Yes   Problem Relation Age of Onset   • Mental illness Other        SOCIAL HISTORY  Social History     Socioeconomic History   • Marital status:      Spouse name: Not on file   • Number of children: Not on file   • Years of education: Not on file   • Highest education level: Not on file   Tobacco Use   • Smoking status: Current Every Day Smoker     Packs/day: 0.50     Start date: 9/22/1987   • Smokeless tobacco: Never Used   Substance and Sexual Activity   • Alcohol use: No   • Drug use: No       REVIEW OF SYSTEMS  Review of Systems   Constitutional: Negative for activity change, appetite change, chills, diaphoresis, fatigue, fever, unexpected weight gain  "and unexpected weight loss.   HENT: Negative for congestion, dental problem, drooling, ear discharge, ear pain, facial swelling, hearing loss, mouth sores, nosebleeds, postnasal drip, rhinorrhea, sinus pressure, sneezing, sore throat, swollen glands, tinnitus, trouble swallowing and voice change.    Respiratory: Negative for apnea, cough, choking, chest tightness, shortness of breath, wheezing and stridor.    Cardiovascular: Negative for chest pain, palpitations and leg swelling.   Gastrointestinal: Positive for abdominal distention, GERD and indigestion. Negative for abdominal pain, anal bleeding, blood in stool, constipation, diarrhea, nausea, rectal pain and vomiting.   Endocrine: Negative for cold intolerance, heat intolerance, polydipsia, polyphagia and polyuria.   Musculoskeletal: Negative for arthralgias, back pain, gait problem, joint swelling, myalgias, neck pain, neck stiffness and bursitis.   Skin: Negative for color change, dry skin, rash and skin lesions.   Allergic/Immunologic: Negative for environmental allergies, food allergies and immunocompromised state.   Neurological: Negative for dizziness, tremors, seizures, syncope, facial asymmetry, speech difficulty, weakness, light-headedness, numbness, headache, memory problem and confusion.   Hematological: Negative for adenopathy. Does not bruise/bleed easily.   Psychiatric/Behavioral: Negative for agitation, behavioral problems, decreased concentration, dysphoric mood, hallucinations, self-injury, sleep disturbance, suicidal ideas, negative for hyperactivity, depressed mood and stress. The patient is not nervous/anxious.          PHYSICAL EXAM   /88 (BP Location: Right arm, Patient Position: Sitting, Cuff Size: Adult)   Pulse 89   Temp 97.3 °F (36.3 °C) (Temporal)   Ht 152.4 cm (60\")   Wt 67.4 kg (148 lb 9.6 oz)   BMI 29.02 kg/m²   Physical Exam   Constitutional: She is oriented to person, place, and time. She appears well-developed and " well-nourished.   HENT:   Head: Normocephalic.   Mouth/Throat: Oropharynx is clear and moist.   Eyes: Pupils are equal, round, and reactive to light. EOM are normal.   Neck: Normal range of motion. Neck supple.   Cardiovascular: Normal rate and regular rhythm.   Pulmonary/Chest: Effort normal and breath sounds normal. She has no wheezes. She has no rales.   Abdominal: Soft. Bowel sounds are normal. She exhibits no mass. There is tenderness in the epigastric area. There is no rebound and no guarding. No hernia.   Musculoskeletal: Normal range of motion.   Neurological: She is alert and oriented to person, place, and time. No cranial nerve deficit.   Skin: Skin is warm and dry.   Psychiatric: She has a normal mood and affect. Her behavior is normal. Judgment normal.   Nursing note and vitals reviewed.    Results Review:  [facility] reviewed and discussed with patient.     ASSESSMENT / PLAN  1.  Chronic GERD  2.  Dyspepsia  -Discontinue pantoprazole and OTC Nexium  -Begin Nexium 40 mg twice daily  -EGD    Return for Follow up after procedures.    I discussed the patients findings and my recommendations with patient    BHRAAT Cruz

## 2020-08-03 NOTE — TELEPHONE ENCOUNTER
----- Message from Aydee Parrish sent at 7/30/2020  2:17 PM EDT -----  Regarding: FW: Test Results Question  Contact: [phone]      ----- Message -----  From: Mildred Pan MA  Sent: 7/30/2020   7:12 AM EDT  To: BHARAT Ley  Subject: FW: Test Results Question                            ----- Message -----  From: Mallika Brewster  Sent: 7/29/2020   4:45 PM EDT  To: Amanda Martinez [facility]  Subject: Test Results Question                            I have a question about XR On my right foot , because it hurts and still swollen , thank you

## 2020-08-03 NOTE — TELEPHONE ENCOUNTER
Pt states her foot still hurts really bad and  has had no improvement. She said she did take and complete the steroid pack.

## 2020-08-04 DIAGNOSIS — M79.671 RIGHT FOOT PAIN: Primary | ICD-10-CM

## 2020-08-11 ENCOUNTER — OFFICE VISIT (OUTPATIENT)
Dept: INTERNAL MEDICINE | Facility: CLINIC | Age: 55
End: 2020-08-11

## 2020-08-11 ENCOUNTER — LAB (OUTPATIENT)
Dept: LAB | Facility: HOSPITAL | Age: 55
End: 2020-08-11

## 2020-08-11 VITALS
WEIGHT: 146 LBS | DIASTOLIC BLOOD PRESSURE: 80 MMHG | HEART RATE: 88 BPM | HEIGHT: 60 IN | RESPIRATION RATE: 18 BRPM | OXYGEN SATURATION: 98 % | TEMPERATURE: 97.1 F | BODY MASS INDEX: 28.66 KG/M2 | SYSTOLIC BLOOD PRESSURE: 124 MMHG

## 2020-08-11 DIAGNOSIS — R74.8 ELEVATED LIVER ENZYMES: ICD-10-CM

## 2020-08-11 DIAGNOSIS — E55.9 VITAMIN D DEFICIENCY: ICD-10-CM

## 2020-08-11 DIAGNOSIS — M54.2 NECK PAIN: ICD-10-CM

## 2020-08-11 DIAGNOSIS — E11.9 TYPE 2 DIABETES MELLITUS WITHOUT COMPLICATION, WITHOUT LONG-TERM CURRENT USE OF INSULIN (HCC): ICD-10-CM

## 2020-08-11 DIAGNOSIS — R22.1 NECK SWELLING: Primary | ICD-10-CM

## 2020-08-11 LAB
ALBUMIN SERPL-MCNC: 4.3 G/DL (ref 3.5–5.2)
ALBUMIN/GLOB SERPL: 1.6 G/DL
ALP SERPL-CCNC: 51 U/L (ref 39–117)
ALT SERPL W P-5'-P-CCNC: 87 U/L (ref 1–33)
ANION GAP SERPL CALCULATED.3IONS-SCNC: 9.9 MMOL/L (ref 5–15)
AST SERPL-CCNC: 51 U/L (ref 1–32)
BASOPHILS # BLD AUTO: 0.09 10*3/MM3 (ref 0–0.2)
BASOPHILS NFR BLD AUTO: 1 % (ref 0–1.5)
BILIRUB SERPL-MCNC: 1.1 MG/DL (ref 0–1.2)
BUN SERPL-MCNC: 10 MG/DL (ref 6–20)
BUN/CREAT SERPL: 13 (ref 7–25)
CALCIUM SPEC-SCNC: 9.7 MG/DL (ref 8.6–10.5)
CHLORIDE SERPL-SCNC: 100 MMOL/L (ref 98–107)
CO2 SERPL-SCNC: 27.1 MMOL/L (ref 22–29)
CREAT SERPL-MCNC: 0.77 MG/DL (ref 0.57–1)
DEPRECATED RDW RBC AUTO: 40.4 FL (ref 37–54)
EOSINOPHIL # BLD AUTO: 0.3 10*3/MM3 (ref 0–0.4)
EOSINOPHIL NFR BLD AUTO: 3.4 % (ref 0.3–6.2)
ERYTHROCYTE [DISTWIDTH] IN BLOOD BY AUTOMATED COUNT: 12.4 % (ref 12.3–15.4)
GFR SERPL CREATININE-BSD FRML MDRD: 78 ML/MIN/1.73
GFR SERPL CREATININE-BSD FRML MDRD: 95 ML/MIN/1.73
GLOBULIN UR ELPH-MCNC: 2.7 GM/DL
GLUCOSE SERPL-MCNC: 195 MG/DL (ref 65–99)
HBA1C MFR BLD: 7.2 % (ref 4.8–5.6)
HCT VFR BLD AUTO: 41.7 % (ref 34–46.6)
HGB BLD-MCNC: 13.9 G/DL (ref 12–15.9)
IMM GRANULOCYTES # BLD AUTO: 0.03 10*3/MM3 (ref 0–0.05)
IMM GRANULOCYTES NFR BLD AUTO: 0.3 % (ref 0–0.5)
LYMPHOCYTES # BLD AUTO: 2.81 10*3/MM3 (ref 0.7–3.1)
LYMPHOCYTES NFR BLD AUTO: 31.5 % (ref 19.6–45.3)
MCH RBC QN AUTO: 29.8 PG (ref 26.6–33)
MCHC RBC AUTO-ENTMCNC: 33.3 G/DL (ref 31.5–35.7)
MCV RBC AUTO: 89.5 FL (ref 79–97)
MONOCYTES # BLD AUTO: 0.63 10*3/MM3 (ref 0.1–0.9)
MONOCYTES NFR BLD AUTO: 7.1 % (ref 5–12)
NEUTROPHILS NFR BLD AUTO: 5.06 10*3/MM3 (ref 1.7–7)
NEUTROPHILS NFR BLD AUTO: 56.7 % (ref 42.7–76)
NRBC BLD AUTO-RTO: 0 /100 WBC (ref 0–0.2)
PLATELET # BLD AUTO: 371 10*3/MM3 (ref 140–450)
PMV BLD AUTO: 10 FL (ref 6–12)
POTASSIUM SERPL-SCNC: 4 MMOL/L (ref 3.5–5.2)
PROT SERPL-MCNC: 7 G/DL (ref 6–8.5)
RBC # BLD AUTO: 4.66 10*6/MM3 (ref 3.77–5.28)
SODIUM SERPL-SCNC: 137 MMOL/L (ref 136–145)
TSH SERPL DL<=0.05 MIU/L-ACNC: 0.69 UIU/ML (ref 0.27–4.2)
WBC # BLD AUTO: 8.92 10*3/MM3 (ref 3.4–10.8)

## 2020-08-11 PROCEDURE — 83036 HEMOGLOBIN GLYCOSYLATED A1C: CPT | Performed by: NURSE PRACTITIONER

## 2020-08-11 PROCEDURE — 85025 COMPLETE CBC W/AUTO DIFF WBC: CPT | Performed by: NURSE PRACTITIONER

## 2020-08-11 PROCEDURE — 99214 OFFICE O/P EST MOD 30 MIN: CPT | Performed by: NURSE PRACTITIONER

## 2020-08-11 PROCEDURE — 82306 VITAMIN D 25 HYDROXY: CPT | Performed by: NURSE PRACTITIONER

## 2020-08-11 PROCEDURE — 84443 ASSAY THYROID STIM HORMONE: CPT | Performed by: NURSE PRACTITIONER

## 2020-08-11 PROCEDURE — 36415 COLL VENOUS BLD VENIPUNCTURE: CPT | Performed by: NURSE PRACTITIONER

## 2020-08-11 PROCEDURE — 80053 COMPREHEN METABOLIC PANEL: CPT | Performed by: NURSE PRACTITIONER

## 2020-08-11 PROCEDURE — 80074 ACUTE HEPATITIS PANEL: CPT | Performed by: NURSE PRACTITIONER
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
## 2020-08-12 PROBLEM — Z79.4 TYPE 2 DIABETES MELLITUS WITHOUT COMPLICATION, WITH LONG-TERM CURRENT USE OF INSULIN (HCC): Status: ACTIVE | Noted: 2020-05-31

## 2020-08-12 PROBLEM — R74.8 ELEVATED LIVER ENZYMES: Status: ACTIVE | Noted: 2020-08-12

## 2020-08-12 PROBLEM — E55.9 VITAMIN D DEFICIENCY: Status: ACTIVE | Noted: 2020-08-12

## 2020-08-12 LAB
25(OH)D3 SERPL-MCNC: 37.8 NG/ML (ref 30–100)
HAV IGM SERPL QL IA: NORMAL
HBV CORE IGM SERPL QL IA: NORMAL
HBV SURFACE AG SERPL QL IA: NORMAL
HCV AB SER DONR QL: NORMAL

## 2020-08-12 NOTE — ASSESSMENT & PLAN NOTE
Chronic issue worsening and unstable. Educated to refrain from alcohol and tylenol use. Liver enzymes will be recheck and hep panel completed. If labs continue to be elevated then liver US will be ordered.

## 2020-08-12 NOTE — ASSESSMENT & PLAN NOTE
Chronic issue stable requiring medication management and monitoring. Will eat well balanced heart healthy diabetic diet, drink adequate water, increase physical activity, and get adequate rest. Will monitor blood sugars daily and keep log for next appt. Will contact office earlier if blood sugars are averaging above 250.   Continue metformin and januvia.

## 2020-08-12 NOTE — ASSESSMENT & PLAN NOTE
Chronic issue requiring diet changes, monitoring, and dietary supplement. Will increase dietary intake of Vitamin D through dairy milk, plant/nut based milk, and fortified foods such as juice and cereal. Will start dietary supplement as directed.

## 2020-08-13 RX ORDER — ERGOCALCIFEROL 1.25 MG/1
50000 CAPSULE ORAL WEEKLY
Qty: 12 CAPSULE | Refills: 2 | Status: SHIPPED | OUTPATIENT
Start: 2020-08-13 | End: 2021-04-27

## 2020-08-13 NOTE — PROGRESS NOTES
My chart message:     Vitamin D looks better but I still want you to take the supplement.  Your blood sugars have improved.  Your liver enzymes are a bit higher so I have ordered the liver ultrasound that we talked about.  Your blood cell counts are normal which means that you do not have infection.  This rules out any infectious cause for your neck swelling.  I will call you soon as I get results on your ultrasounds.  All other results are normal.

## 2020-08-18 ENCOUNTER — HOSPITAL ENCOUNTER (OUTPATIENT)
Dept: ULTRASOUND IMAGING | Facility: HOSPITAL | Age: 55
Discharge: HOME | End: 2020-08-18
Admitting: NURSE PRACTITIONER

## 2020-08-18 ENCOUNTER — HOSPITAL ENCOUNTER (OUTPATIENT)
Dept: ULTRASOUND IMAGING | Facility: HOSPITAL | Age: 55
Discharge: HOME | End: 2020-08-18

## 2020-08-18 DIAGNOSIS — R74.8 ELEVATED LIVER ENZYMES: ICD-10-CM

## 2020-08-18 DIAGNOSIS — R22.1 NECK SWELLING: ICD-10-CM

## 2020-08-18 PROCEDURE — 76536 US EXAM OF HEAD AND NECK: CPT

## 2020-08-18 PROCEDURE — 76705 ECHO EXAM OF ABDOMEN: CPT

## 2020-08-19 ENCOUNTER — TELEPHONE (OUTPATIENT)
Dept: INTERNAL MEDICINE | Facility: CLINIC | Age: 55
End: 2020-08-19

## 2020-08-19 NOTE — TELEPHONE ENCOUNTER
PATIENT IS REQUESTING RESULTS FOR ULTRASOUND ON HER LIVER. TEST WAS PERFORMED 8/18/20.    PLEASE CALL [phone]

## 2020-08-20 NOTE — PROGRESS NOTES
My chart message:    You have significant fatty liver disease. You need to eat a diet low in cholesterol and fat and increase your fresh fruits and vegetables. Start an over the counter omega 3 supplement. Take you cholesterol medication every night. I want to monitor your labs every 3 months.

## 2020-08-21 ENCOUNTER — HOSPITAL ENCOUNTER (OUTPATIENT)
Dept: MRI IMAGING | Facility: HOSPITAL | Age: 55
Discharge: HOME | End: 2020-08-21
Admitting: NURSE PRACTITIONER

## 2020-08-21 ENCOUNTER — TELEPHONE (OUTPATIENT)
Dept: INTERNAL MEDICINE | Facility: CLINIC | Age: 55
End: 2020-08-21

## 2020-08-21 DIAGNOSIS — M79.671 RIGHT FOOT PAIN: ICD-10-CM

## 2020-08-21 PROCEDURE — 73718 MRI LOWER EXTREMITY W/O DYE: CPT

## 2020-08-21 NOTE — PROGRESS NOTES
My chart message:    I apologize and thought this was already resulted to you. There is no fluid or infection and looks like a lipoma which is a fatty cyst. I suggest going to general surgery to see of they can remove this for you. You want me to make a referral?

## 2020-08-21 NOTE — TELEPHONE ENCOUNTER
PATIENT CALLING IN TO REQUEST TEST RESULTS FROM ULTRA SOUND OF HER NECK.    PLEASE CALL AND ADVISE [phone]

## 2020-08-23 ENCOUNTER — APPOINTMENT (OUTPATIENT)
Dept: PREADMISSION TESTING | Facility: HOSPITAL | Age: 55
End: 2020-08-23

## 2020-08-23 PROCEDURE — U0004 COV-19 TEST NON-CDC HGH THRU: HCPCS

## 2020-08-23 PROCEDURE — C9803 HOPD COVID-19 SPEC COLLECT: HCPCS

## 2020-08-23 PROCEDURE — U0002 COVID-19 LAB TEST NON-CDC: HCPCS

## 2020-08-24 DIAGNOSIS — M65.9 TENOSYNOVITIS: ICD-10-CM

## 2020-08-24 DIAGNOSIS — G57.51 TARSAL TUNNEL SYNDROME OF RIGHT SIDE: Primary | ICD-10-CM

## 2020-08-24 DIAGNOSIS — D17.0 LIPOMA OF NECK: Primary | ICD-10-CM

## 2020-08-24 DIAGNOSIS — M79.671 RIGHT FOOT PAIN: ICD-10-CM

## 2020-08-24 LAB
REF LAB TEST METHOD: NORMAL
SARS-COV-2 RNA RESP QL NAA+PROBE: NOT DETECTED

## 2020-08-24 NOTE — PROGRESS NOTES
My chart message:    You have some fluid build up and inflammation within your foot. I am referring you to ortho specialist to take a look and see if they can do anything to help.

## 2020-08-26 ENCOUNTER — OUTSIDE FACILITY SERVICE (OUTPATIENT)
Dept: GASTROENTEROLOGY | Facility: CLINIC | Age: 55
End: 2020-08-26

## 2020-08-26 DIAGNOSIS — E11.9 TYPE 2 DIABETES MELLITUS WITHOUT COMPLICATION, WITH LONG-TERM CURRENT USE OF INSULIN (HCC): ICD-10-CM

## 2020-08-26 DIAGNOSIS — Z79.4 TYPE 2 DIABETES MELLITUS WITHOUT COMPLICATION, WITH LONG-TERM CURRENT USE OF INSULIN (HCC): ICD-10-CM

## 2020-08-26 PROCEDURE — 43239 EGD BIOPSY SINGLE/MULTIPLE: CPT | Performed by: INTERNAL MEDICINE

## 2020-08-26 PROCEDURE — 43249 ESOPH EGD DILATION <30 MM: CPT | Performed by: INTERNAL MEDICINE

## 2020-08-27 RX ORDER — SITAGLIPTIN 50 MG/1
TABLET, FILM COATED ORAL
Qty: 90 TABLET | Refills: 3 | Status: SHIPPED | OUTPATIENT
Start: 2020-08-27 | End: 2021-07-13

## 2020-08-31 ENCOUNTER — TELEPHONE (OUTPATIENT)
Dept: INTERNAL MEDICINE | Facility: CLINIC | Age: 55
End: 2020-08-31

## 2020-08-31 NOTE — TELEPHONE ENCOUNTER
Patient stated her physical therapist sent a referral to pain management but she stated her insurance needs it to come form her PCP.     Please call patient and advise [phone]
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
## 2020-08-31 NOTE — TELEPHONE ENCOUNTER
Patient requesting a referral to a hand specialist for her trigger finger and pain.    Patient's call back number [phone]

## 2020-08-31 NOTE — TELEPHONE ENCOUNTER
I referred her to ortho on 8/24 for her foot. She should be receiving call with an appt. They can also address her trigger finger.

## 2020-08-31 NOTE — TELEPHONE ENCOUNTER
What is the pain management referral for? I have referred her to ortho for her foot. If this is for neck pain she will need MRI first.

## 2020-09-01 DIAGNOSIS — M54.6 CHRONIC MIDLINE THORACIC BACK PAIN: ICD-10-CM

## 2020-09-01 DIAGNOSIS — G89.29 CHRONIC MIDLINE THORACIC BACK PAIN: ICD-10-CM

## 2020-09-01 DIAGNOSIS — M54.2 CERVICAL PAIN: Primary | ICD-10-CM

## 2020-09-02 ENCOUNTER — OFFICE VISIT (OUTPATIENT)
Dept: ORTHOPEDIC SURGERY | Facility: CLINIC | Age: 55
End: 2020-09-02

## 2020-09-02 VITALS — OXYGEN SATURATION: 98 % | HEIGHT: 60 IN | BODY MASS INDEX: 28.65 KG/M2 | HEART RATE: 94 BPM | WEIGHT: 145.94 LBS

## 2020-09-02 DIAGNOSIS — E11.65 UNCONTROLLED TYPE 2 DIABETES MELLITUS WITH HYPERGLYCEMIA (HCC): ICD-10-CM

## 2020-09-02 DIAGNOSIS — M79.671 RIGHT FOOT PAIN: Primary | ICD-10-CM

## 2020-09-02 DIAGNOSIS — M19.071 OSTEOARTHRITIS OF RIGHT ANKLE OR FOOT: ICD-10-CM

## 2020-09-02 PROCEDURE — 99203 OFFICE O/P NEW LOW 30 MIN: CPT | Performed by: ORTHOPAEDIC SURGERY

## 2020-09-02 NOTE — PROGRESS NOTES
NEW PATIENT    Patient: Mallika Brewster  : 1965    Primary Care Provider: Amanda Katz APRN    Requesting Provider: As above    Pain of the Right Foot      History    Chief Complaint: Right foot pain    History of Present Illness: This is a very pleasant 55-year-old woman here with right foot pain.  She reports she has had intermittent pain on the dorsum of the right foot for about 2 years.  It tends to come and go with activity, in April she had an episode where it was more severe and persistent.  She noted some swelling on the dorsum of the foot.  She took a Medrol Dosepak and that helped a great deal.  She has had nonweightbearing x-rays in the system from  that I looked at, they are unremarkable.  She had an MRI done 2020 that I looked at and the report.  The report reads it is possible tarsal tunnel syndrome but that cannot be determined from an MRI.  Moreover it does not mention the fact that she has tarsometatarsal arthritis especially the second and third.  She has arthritis in other areas, and she takes Relafen which helps.  She has been diabetic for 3 years her most recent hemoglobin A1c was 7.2 which is not ideal control.  We talked about the importance of good glucose control to decrease the risk of complications such as neuropathy which can end up with amputation.  She did quit smoking 3 years ago and that is excellent, I explained how smoking increases the risk of diabetic foot complications.  She reports she feels better in more supportive shoes.  She rates the pain is 6 out of 10 at its worst, sharp and aching    She also reports she has had a right trigger finger surgery in [place], she has a left trigger finger and carpal tunnel and she would like to see 1 of the partners, we will make an appointment    Current Outpatient Medications on File Prior to Visit   Medication Sig Dispense Refill   • buPROPion XL (WELLBUTRIN XL) 150 MG 24 hr tablet Take 1 tablet by mouth  Daily. 30 tablet 2   • esomeprazole (nexIUM) 40 MG capsule Take 1 capsule by mouth 2 (Two) Times a Day. 60 capsule 11   • FLUoxetine (PROzac) 20 MG capsule Take 1 capsule by mouth Daily. 30 capsule 2   • glucose blood (Glucose Meter Test) test strip Use as instructed to check blood glucose levels 3 times daily 100 each 5   • glucose monitor monitoring kit 1 each As Needed (Check blood sugars 3 times daily PRN.). 1 each 0   • JANUVIA 50 MG tablet TAKE 1 TABLET DAILY 90 tablet 3   • Lancets misc Use as instructed to test blood glucose levels 3 times daily. 100 each 5   • linaclotide (LINZESS) 145 MCG capsule capsule Take 145 mcg by mouth Daily.     • losartan (COZAAR) 25 MG tablet Take 1 tablet by mouth Daily.     • metFORMIN (GLUCOPHAGE) 1000 MG tablet      • montelukast (SINGULAIR) 10 MG tablet Take 1 tablet by mouth Daily.     • nabumetone (RELAFEN) 500 MG tablet      • predniSONE (DELTASONE) 5 MG tablet Take as directed on package instruction - 6 day taper. 21 tablet 0   • QUEtiapine (SEROquel) 25 MG tablet Take 1-2 tablets 30 minutes before bedtime as needed for sleep. 180 tablet 2   • simvastatin (ZOCOR) 20 MG tablet Take 1 tablet by mouth Daily.     • sucralfate (CARAFATE) 1 g tablet Take 4 tablets by mouth Daily.     • vitamin D (ERGOCALCIFEROL) 1.25 MG (02505 UT) capsule capsule Take 1 capsule by mouth 1 (One) Time Per Week. 12 capsule 2     No current facility-administered medications on file prior to visit.       No Known Allergies   Past Medical History:   Diagnosis Date   • Acid reflux    • Arthritis    • Back pain    • Bronchitis    • Diabetes mellitus (CMS/HCC)    • High cholesterol    • Left hip pain      Past Surgical History:   Procedure Laterality Date   • CHOLECYSTECTOMY     • HYSTERECTOMY     • OOPHORECTOMY     • SHOULDER ROTATOR CUFF REPAIR Left      Family History   Adopted: Yes   Problem Relation Age of Onset   • Mental illness Other       Social History     Socioeconomic History   • Marital  "status:      Spouse name: Not on file   • Number of children: Not on file   • Years of education: Not on file   • Highest education level: Not on file   Tobacco Use   • Smoking status: Current Every Day Smoker     Packs/day: 0.50     Start date: 9/22/1987   • Smokeless tobacco: Never Used   Substance and Sexual Activity   • Alcohol use: No   • Drug use: No        Review of Systems   Constitutional: Negative.    HENT: Negative.    Eyes: Negative.    Respiratory: Negative.    Cardiovascular: Negative.    Gastrointestinal: Negative.    Endocrine: Negative.    Genitourinary: Negative.    Musculoskeletal: Positive for arthralgias, back pain and neck stiffness.   Skin: Negative.    Allergic/Immunologic: Positive for environmental allergies.   Neurological: Negative.    Hematological: Negative.    Psychiatric/Behavioral: Negative.        The following portions of the patient's history were reviewed and updated as appropriate: allergies, current medications, past family history, past medical history, past social history, past surgical history and problem list.    Physical Exam:   Pulse 94   Ht 152.4 cm (60\")   Wt 66.2 kg (145 lb 15.1 oz)   SpO2 98%   BMI 28.50 kg/m²   GENERAL: Body habitus: overweight    Lower extremity edema: Right: none; Left: none    Varicose veins:  Right: none; Left: none    Gait: normal     Mental Status:  awake and alert; oriented to person, place, and time    Voice:  clear  SKIN:  Lower extremity: Normal    Hair Growth(lower extremity):  Right:normal; Left:  normal  NAILS: Toenails: normal  HEENT: Head: Normocephalic, atraumatic,  without obvious abnormality.  eye: normal external eye, no icterus  ears:normal external ears  PULM:  Repiratory effort normal  CV:  Dorsalis Pedis:  Right: 2+; Left:2+    Posterior Tibial: Right:2+; Left:2+    Capillary Refill:  Brisk  MSK:  Hand:sensation intact      Tibia:  Right:  non tender; Left:  non tender      Ankle:  Right: non tender, ROM  normal and " "symmetric and motor function  normal; Left:  non tender, ROM  normal and symmetric and motor function  normal      Foot:  Right:  Tender to palpation over the second third and fourth tarsometatarsal joints, otherwise nontender, normal range of motion, no Tinel's in the tarsal tunnel, no abnormality of hindfoot range of motion, no other tenderness; Left:  non tender, ROM  normal and motor function  normal      NEURO: Heel Walking:  Right:  normal; Left:  normal    Toe Walking:  Right:  She is able to do this with some pain in the TMT's; Left:  normal     [place] 5.07 monofilament test: normal    Lower extremity sensation: intact     Reflexes:  Biceps:  Right:  not tested; Left:  not tested           Quads:  Right:  not tested; Left:  not tested           Ankle:  Right:  not tested; Left:  not tested      Calf Atrophy:none    Motor Function: all 5/5         Medical Decision Making    Data Review:   ordered and reviewed x-rays today, reviewed radiology images and reviewed radiology results    Assessment and Plan/ Diagnosis/Treatment options:   1. Osteoarthritis of right ankle or foot  Her pain is due to arthritis in the tarsometatarsal joints.  I explained that the MRI was \"over read\".  I explained you cannot make the diagnosis of tarsal tunnel based on an MRI unless there is a specific mass in the tarsal tunnel.  She has no symptoms consistent withtarsal tunnel, her pain is not in any area of the tarsal tunnel.  Her pain is consistent with the midfoot arthritis.  I recommend custom orthotics and I use the foot model to show her how these can help support the joints from below.  I explained that arthritis is a loss of cartilage and is these joints lose cartilage they begin to sag.  Orthotics can therefore help.  I would also recommend topical Voltaren gel.  If she has another flare we can do a steroid injection.    2. Uncontrolled type 2 diabetes mellitus with hyperglycemia (CMS/[facility])  As above we talked about " the importance of good glucose control to prevent complications              Radiology Ordered []  Radiology Reports Reviewed []      Radiology Images Reviewed []   Labs Reviewed []    Labs Ordered []   PCP Records Reviewed []    Provider Records Reviewed []    ER Records Reviewed []    Hospital Records Reviewed []    History Obtained From Family []    Phone conversation with Provider []    Records Requested []

## 2020-09-08 ENCOUNTER — OFFICE VISIT (OUTPATIENT)
Dept: ORTHOPEDIC SURGERY | Facility: CLINIC | Age: 55
End: 2020-09-08

## 2020-09-08 VITALS — HEIGHT: 60 IN | WEIGHT: 145.94 LBS | BODY MASS INDEX: 28.65 KG/M2 | HEART RATE: 87 BPM | OXYGEN SATURATION: 99 %

## 2020-09-08 DIAGNOSIS — G56.02 CARPAL TUNNEL SYNDROME OF LEFT WRIST: Primary | ICD-10-CM

## 2020-09-08 DIAGNOSIS — M65.332 TRIGGER MIDDLE FINGER OF LEFT HAND: ICD-10-CM

## 2020-09-08 DIAGNOSIS — Z72.0 TOBACCO USE: ICD-10-CM

## 2020-09-08 DIAGNOSIS — Z79.4 TYPE 2 DIABETES MELLITUS WITHOUT COMPLICATION, WITH LONG-TERM CURRENT USE OF INSULIN (HCC): ICD-10-CM

## 2020-09-08 DIAGNOSIS — E11.9 TYPE 2 DIABETES MELLITUS WITHOUT COMPLICATION, WITH LONG-TERM CURRENT USE OF INSULIN (HCC): ICD-10-CM

## 2020-09-08 PROCEDURE — 99214 OFFICE O/P EST MOD 30 MIN: CPT | Performed by: PHYSICIAN ASSISTANT

## 2020-09-08 NOTE — PROGRESS NOTES
"    [facility] Note    Subjective     Chief Complaint   Patient presents with   • Left Wrist - Pain   • Left Hand - Pain     Left middle Trigger finger        HPI  Mallika Brewster is a 55 y.o. female.  Right-hand-dominant.  Patient presents for evaluation of her left hand/wrist.  She states that she has been diagnosed with left carpal tunnel syndrome in the past and is here today to receive treatment.  Symptoms of been ongoing for 9 months.  She initially had bilateral carpal syndrome but had the right carpal tunnel released October 2020 by Dr. Lopez Mosqueda ([place], [place]).  She was supposed to proceed with the left side but her  was ill and she had to take care of him.    Currently she endorses a pain scale of 8/10.  Severity the pain moderate.  Quality the pain aching.  She notes numbness and tingling into all digits.  Associated symptoms popping.  She has difficulty with gripping and grasping secondary to the pain.  She feels like she is to drop objects when trying to hold him with her left hand.    With regards to her bilateral carpal tunnel she thinks she is received corticosteroid injections (both wrists) in the past which did not help.    Additionally she also notes locking left middle finger at the level of the A1 pulley.  Symptoms are worse at night.  She sometimes manually \"unlock\" finger.    No reported fever, chills, night sweats or other constitutional symptoms.  Patient does have a history of diabetes with A1c as of 8/11/2020--7.2.  She is also currently half a pack a day smoker.    Past Medical History:   Diagnosis Date   • Acid reflux    • Arthritis    • Back pain    • Bronchitis    • Diabetes mellitus (CMS/[facility])    • High cholesterol    • Left hip pain       Past Surgical History:   Procedure Laterality Date   • CHOLECYSTECTOMY     • HYSTERECTOMY     • OOPHORECTOMY     • SHOULDER ROTATOR CUFF REPAIR Left       Family History   Adopted: Yes   Problem Relation " Age of Onset   • Mental illness Other      Social History     Socioeconomic History   • Marital status:      Spouse name: Not on file   • Number of children: Not on file   • Years of education: Not on file   • Highest education level: Not on file   Tobacco Use   • Smoking status: Current Every Day Smoker     Packs/day: 0.50     Start date: 9/22/1987   • Smokeless tobacco: Never Used   Substance and Sexual Activity   • Alcohol use: No   • Drug use: No      Current Outpatient Medications on File Prior to Visit   Medication Sig Dispense Refill   • glucose blood (Glucose Meter Test) test strip Use as instructed to check blood glucose levels 3 times daily 100 each 5   • glucose monitor monitoring kit 1 each As Needed (Check blood sugars 3 times daily PRN.). 1 each 0   • JANUVIA 50 MG tablet TAKE 1 TABLET DAILY 90 tablet 3   • Lancets misc Use as instructed to test blood glucose levels 3 times daily. 100 each 5   • linaclotide (LINZESS) 145 MCG capsule capsule Take 145 mcg by mouth Daily.     • losartan (COZAAR) 25 MG tablet Take 1 tablet by mouth Daily.     • metFORMIN (GLUCOPHAGE) 1000 MG tablet      • montelukast (SINGULAIR) 10 MG tablet Take 1 tablet by mouth Daily.     • nabumetone (RELAFEN) 500 MG tablet      • QUEtiapine (SEROquel) 25 MG tablet Take 1-2 tablets 30 minutes before bedtime as needed for sleep. 180 tablet 2   • simvastatin (ZOCOR) 20 MG tablet Take 1 tablet by mouth Daily.     • sucralfate (CARAFATE) 1 g tablet Take 4 tablets by mouth Daily.     • vitamin D (ERGOCALCIFEROL) 1.25 MG (90962 UT) capsule capsule Take 1 capsule by mouth 1 (One) Time Per Week. 12 capsule 2     No current facility-administered medications on file prior to visit.       No Known Allergies     The following portions of the patient's history were reviewed and updated as appropriate: allergies, current medications, past family history, past medical history, past social history, past surgical history and problem  "list.    Review of Systems   Constitutional: Negative.    HENT: Negative.    Eyes: Negative.    Respiratory: Negative.    Cardiovascular: Negative.    Gastrointestinal: Negative.    Endocrine: Negative.    Genitourinary: Negative.    Musculoskeletal: Positive for arthralgias.   Skin: Negative.    Allergic/Immunologic: Negative.    Neurological: Negative.    Hematological: Negative.    Psychiatric/Behavioral: Negative.         Objective      Physical Exam  Pulse 87   Ht 152.4 cm (60\")   Wt 66.2 kg (145 lb 15.1 oz)   SpO2 99%   BMI 28.50 kg/m²     Body mass index is 28.5 kg/m².    GENERAL APPEARANCE: awake, alert & oriented x 3, in no acute distress and well developed, well nourished  PSYCH: normal mood and affect  LUNGS:  breathing nonlabored, no wheezing  EYES: sclera anicteric, pupils equal  CARDIOVASCULAR: palpable pulses. Capillary refill less than 2 seconds  INTEGUMENTARY: skin intact, no clubbing, cyanosis  NEUROLOGIC:  Normal Sensation        Ortho Exam  Peripheral Vascular   Bilateral Upper Extremity    No cyanotic nail beds    Pink nail beds and rapid capillary refill   Palpation    Radial Pulse - Bilaterally normal    Neurologic   Sensory: Light touch intact- Right and left hand    Left Upper Extremity    Left wrist extensors: 5/5    Left wrist flexors: 5/5    Left intrinsics: 5/5    Musculoskeletal   Left Elbow    Forearm supination: AROM - 90 degrees    Forearm pronation: AROM - 90 degrees     Inspection and Palpation   Left Wrist    Tenderness - none    Swelling - none    Crepitus - none    Muscle tone - no atrophy     ROJM:   Left Wrist    Flexion: AROM - 90 degrees    Extension: AROM - 90 degrees     Deformities, Malalignments, Discrepancies    None     Functional Testing   Left     Tinel's Sign--positive    Phalen's Sign--positive    Carpal Compression Test-- positive    Thenar Wasting--minimal    APB--4+/5    Elbow Flexion test--negative    Cubital tunnel signs--negative       Strength and " Tone    Right  strength: good    Left  strength: good     Hand Exam: Patient is able to make a composite fist but in doing so there is reproducible triggering noted to the A1 pulley of the middle finger.  She has pain with triggering.      Imaging/Studies  Dr. Mosquera and I reviewed plain film imaging performed on 7/27/2020 of her left hand and wrist.    EXAMINATION: XR WRIST 3+ VW LEFT-      INDICATION: M25.532-Pain in left wrist.      COMPARISON: None.     FINDINGS: Three views of the left wrist reveal no evidence of fracture  or dislocation. The cortex is intact. Joint spaces are preserved. Carpal  bones are unremarkable.         IMPRESSION:  No acute bony abnormality.     D:  07/28/2020  E:  07/28/2020     This report was finalized on 7/28/2020 4:57 PM by Dr. Xochilt Nicholas MD.    EXAMINATION: XR HAND 3+ VW LEFT-      INDICATION: M79.642-Pain in left hand; M65.332-Trigger finger, left  middle finger.      COMPARISON: None.     FINDINGS: AP, lateral and lateral oblique views of the left hand without  acute fracture or osseous malalignment. Cortical margins and joint  spaces are grossly well preserved.         IMPRESSION:  No acute osseous abnormality specifically no acute fracture  or aggressive/erosive process.     D:  07/27/2020  E:  07/28/2020     This report was finalized on 7/29/2020 10:18 AM by Dr. [place].    After the patient left the clinic we were able to obtain her EMG/NCS that was performed at [facility] in [place], [place] December 2019.  Impression: Mild median neuropathy bilateral wrists.  Right side was subsequently released following this procedure.  Review showed left median nerve: Sensory latency of 2.86 ms with decreased velocity of 45 m/s, motor latency 3.75 ms.  Report has been downloaded to our system.      Assessment/Plan        ICD-10-CM ICD-9-CM   1. Carpal tunnel syndrome of left wrist G56.02 354.0   2. Trigger middle finger of left hand M65.332 727.03    3. Type 2 diabetes mellitus without complication, with long-term current use of insulin (CMS/[facility]) E11.9 250.00    Z79.4 V58.67   4. Tobacco use Z72.0 305.1       No orders of the defined types were placed in this encounter.       -Left carpal tunnel syndrome, mild per EMG/NCS.  -Provided cock-up wrist splint as well as instructed on use of anti-inflammatories (she currently has Relafen on-hand).  She is to use this medication on a scheduled basis.  -Left middle finger trigger digit--we discussed possibility of an injection however if she is going to proceed with possible carpal tunnel release in the next 6 weeks she would also like to consider having the trigger digit release at the same time so injection was deferred.  For now avoid strenuous gripping and grasping.  -DM--patient needs to get tighter control of her glucose.  Continue working with PCP.  This could be a possible cause for the numbness and tingling she notes in the digits (diabetic neuropathy versus carpal tunnel syndrome), especially in light of her unresponsiveness to previous corticosteroid injection.  -Tobacco use--discussed the importance of smoking cessation, reviewed the adverse effects of tobacco use: increased risk of tendonitis (more difficult to treat and resolve), hardening of the arteries, gangrene, amputation to name a few. Included in the counseling were treatments options for cessation: quitting cold turkey, medication, nicotine step-down programs and smoking cessation programs. Furthermore, I explained to the patient the importance of abstaining from nicotine usage as nicotine is known to increase risk of complications associated with surgery including but not limited to infection, decreased wound healing, fracture/fusion nonunion, slowed soft tissue healing, and increased surgery associated pain. (3 minutes spent counseling patient)  -Follow-up in 2 weeks with Dr. Mosquera to discuss treatment options.  -Questions and concerns  answered.    History, exam, imaging, study discussed with Dr. Mosquera who agrees with the above assessment and plan.    Medical Decision Making  Management Options : prescription/IM medicine  Data/Risk: radiology tests, EMG/NCS tests      Kourtney Penaloza PA-C  09/11/20  10:19         EMR Dragon/Transcription disclaimer:  Much of this encounter note is an electronic transcription of spoken language to printed text. Electronic transcription of spoken language may permit erroneous, or at times, nonsensical words or phrases to be inadvertently transcribed. Although I have reviewed the note for such errors, some may still exist.

## 2020-09-09 ENCOUNTER — OFFICE VISIT (OUTPATIENT)
Dept: GASTROENTEROLOGY | Facility: CLINIC | Age: 55
End: 2020-09-09

## 2020-09-09 VITALS
DIASTOLIC BLOOD PRESSURE: 81 MMHG | HEART RATE: 86 BPM | TEMPERATURE: 97.1 F | SYSTOLIC BLOOD PRESSURE: 108 MMHG | WEIGHT: 144.8 LBS | BODY MASS INDEX: 28.43 KG/M2 | HEIGHT: 60 IN

## 2020-09-09 DIAGNOSIS — K21.9 GASTROESOPHAGEAL REFLUX DISEASE WITHOUT ESOPHAGITIS: Primary | ICD-10-CM

## 2020-09-09 DIAGNOSIS — F41.9 ANXIETY: ICD-10-CM

## 2020-09-09 DIAGNOSIS — R14.2 ERUCTATION: ICD-10-CM

## 2020-09-09 DIAGNOSIS — F33.1 MODERATE EPISODE OF RECURRENT MAJOR DEPRESSIVE DISORDER (HCC): ICD-10-CM

## 2020-09-09 PROCEDURE — 99213 OFFICE O/P EST LOW 20 MIN: CPT | Performed by: NURSE PRACTITIONER

## 2020-09-09 RX ORDER — BUPROPION HYDROCHLORIDE 150 MG/1
150 TABLET ORAL DAILY
Qty: 90 TABLET | Refills: 2 | Status: SHIPPED | OUTPATIENT
Start: 2020-09-09 | End: 2021-02-01

## 2020-09-09 RX ORDER — ESOMEPRAZOLE MAGNESIUM 40 MG/1
40 CAPSULE, DELAYED RELEASE ORAL 2 TIMES DAILY
Qty: 180 CAPSULE | Refills: 3 | Status: SHIPPED | OUTPATIENT
Start: 2020-09-09 | End: 2021-04-27

## 2020-09-09 RX ORDER — FLUOXETINE HYDROCHLORIDE 20 MG/1
20 CAPSULE ORAL DAILY
Qty: 90 CAPSULE | Refills: 2 | Status: SHIPPED | OUTPATIENT
Start: 2020-09-09 | End: 2021-01-11

## 2020-09-09 RX ORDER — FAMOTIDINE 40 MG/1
40 TABLET, FILM COATED ORAL 2 TIMES DAILY PRN
Qty: 90 TABLET | Refills: 2 | Status: SHIPPED | OUTPATIENT
Start: 2020-09-09 | End: 2020-11-18

## 2020-09-09 NOTE — PROGRESS NOTES
GASTROENTEROLOGY OFFICE NOTE  Mallika Brewster  [id number]  1965    CARE TEAM  Patient Care Team:  Amanda Katz APRN as PCP - General (Nurse Practitioner)    Referring Provider: Amanda Katz APRN    Chief Complaint   Patient presents with   • Heartburn     egd f/u        HISTORY OF PRESENT ILLNESS:  Ms. Brewster returns in follow-up status post EGD for complaints of worsening reflux and intermittent pill dysphagia.  EGD was grossly unremarkable.  The esophagus was dilated to 20 mm with a TTS balloon.  Esophageal biopsies were unremarkable.  Gastric biopsy showed mild gastritis.  The patient reports that she has had no further difficulty swallowing.  At her last visit, we increased her Nexium to 40 mg twice daily and she reports that she has done very well with no complaints of heartburn or acid reflux.  Yesterday however, she had severe heartburn, she does not know why, she had not eaten anything or done anything out of the ordinary.  She took an extra Nexium that relieved her symptoms.  She complains of excessive belching.    PAST MEDICAL HISTORY  Past Medical History:   Diagnosis Date   • Acid reflux    • Arthritis    • Back pain    • Bronchitis    • Diabetes mellitus (CMS/HCC)    • High cholesterol    • Left hip pain         PAST SURGICAL HISTORY  Past Surgical History:   Procedure Laterality Date   • CHOLECYSTECTOMY     • HYSTERECTOMY     • OOPHORECTOMY     • SHOULDER ROTATOR CUFF REPAIR Left         MEDICATIONS:    Current Outpatient Medications:   •  buPROPion XL (WELLBUTRIN XL) 150 MG 24 hr tablet, Take 1 tablet by mouth Daily., Disp: 30 tablet, Rfl: 2  •  esomeprazole (nexIUM) 40 MG capsule, Take 1 capsule by mouth 2 (Two) Times a Day., Disp: 180 capsule, Rfl: 3  •  famotidine (PEPCID) 40 MG tablet, Take 1 tablet by mouth 2 (Two) Times a Day As Needed for Heartburn., Disp: 90 tablet, Rfl: 2  •  FLUoxetine (PROzac) 20 MG capsule, Take 1 capsule by mouth Daily., Disp: 30 capsule,  Rfl: 2  •  glucose blood (Glucose Meter Test) test strip, Use as instructed to check blood glucose levels 3 times daily, Disp: 100 each, Rfl: 5  •  glucose monitor monitoring kit, 1 each As Needed (Check blood sugars 3 times daily PRN.)., Disp: 1 each, Rfl: 0  •  JANUVIA 50 MG tablet, TAKE 1 TABLET DAILY, Disp: 90 tablet, Rfl: 3  •  Lancets misc, Use as instructed to test blood glucose levels 3 times daily., Disp: 100 each, Rfl: 5  •  linaclotide (LINZESS) 145 MCG capsule capsule, Take 145 mcg by mouth Daily., Disp: , Rfl:   •  losartan (COZAAR) 25 MG tablet, Take 1 tablet by mouth Daily., Disp: , Rfl:   •  metFORMIN (GLUCOPHAGE) 1000 MG tablet, , Disp: , Rfl:   •  montelukast (SINGULAIR) 10 MG tablet, Take 1 tablet by mouth Daily., Disp: , Rfl:   •  nabumetone (RELAFEN) 500 MG tablet, , Disp: , Rfl:   •  QUEtiapine (SEROquel) 25 MG tablet, Take 1-2 tablets 30 minutes before bedtime as needed for sleep., Disp: 180 tablet, Rfl: 2  •  simvastatin (ZOCOR) 20 MG tablet, Take 1 tablet by mouth Daily., Disp: , Rfl:   •  sucralfate (CARAFATE) 1 g tablet, Take 4 tablets by mouth Daily., Disp: , Rfl:   •  vitamin D (ERGOCALCIFEROL) 1.25 MG (18619 UT) capsule capsule, Take 1 capsule by mouth 1 (One) Time Per Week., Disp: 12 capsule, Rfl: 2    ALLERGIES  No Known Allergies    FAMILY HISTORY:  Family History   Adopted: Yes   Problem Relation Age of Onset   • Mental illness Other        SOCIAL HISTORY  Social History     Socioeconomic History   • Marital status:      Spouse name: Not on file   • Number of children: Not on file   • Years of education: Not on file   • Highest education level: Not on file   Tobacco Use   • Smoking status: Current Every Day Smoker     Packs/day: 0.50     Start date: 9/22/1987   • Smokeless tobacco: Never Used   Substance and Sexual Activity   • Alcohol use: No   • Drug use: No       REVIEW OF SYSTEMS  Review of Systems   Constitutional: Negative for activity change, appetite change, chills,  "diaphoresis, fatigue, fever, unexpected weight gain and unexpected weight loss.   HENT: Negative for trouble swallowing and voice change.    Gastrointestinal: Negative for abdominal distention, abdominal pain, anal bleeding, blood in stool, constipation, diarrhea, nausea, rectal pain, vomiting, GERD and indigestion.         PHYSICAL EXAM   /81 (BP Location: Right arm, Patient Position: Sitting, Cuff Size: Adult)   Pulse 86   Temp 97.1 °F (36.2 °C) (Temporal)   Ht 152.4 cm (60\")   Wt 65.7 kg (144 lb 12.8 oz)   BMI 28.28 kg/m²   Physical Exam   Constitutional: She is oriented to person, place, and time. She appears well-developed and well-nourished.   HENT:   Head: Normocephalic.   Mouth/Throat: Oropharynx is clear and moist.   Eyes: Pupils are equal, round, and reactive to light. EOM are normal.   Neck: Normal range of motion. Neck supple.   Cardiovascular: Normal rate and regular rhythm.   Pulmonary/Chest: Effort normal and breath sounds normal. She has no wheezes. She has no rales.   Abdominal: Soft. Bowel sounds are normal. She exhibits no mass. There is no tenderness. There is no rebound and no guarding. No hernia.   Musculoskeletal: Normal range of motion.   Neurological: She is alert and oriented to person, place, and time. No cranial nerve deficit.   Skin: Skin is warm and dry.   Psychiatric: She has a normal mood and affect. Her behavior is normal. Judgment normal.   Nursing note and vitals reviewed.        Results Review:  I have reviewed the patient's new clinical results.    ASSESSMENT / PLAN  1. GERD  -Continue Nexium 40 mg twice daily  -Pepcid 40 mg as needed  2.  Eructation  -FODMAP diet    Return if symptoms worsen or fail to improve.    I discussed the patients findings and my recommendations with patient    BHARAT Cruz                    "

## 2020-09-24 ENCOUNTER — OFFICE VISIT (OUTPATIENT)
Dept: ORTHOPEDIC SURGERY | Facility: CLINIC | Age: 55
End: 2020-09-24

## 2020-09-24 ENCOUNTER — TELEPHONE (OUTPATIENT)
Dept: INTERNAL MEDICINE | Facility: CLINIC | Age: 55
End: 2020-09-24

## 2020-09-24 VITALS — WEIGHT: 144.84 LBS | BODY MASS INDEX: 28.44 KG/M2 | OXYGEN SATURATION: 98 % | HEIGHT: 60 IN | HEART RATE: 96 BPM

## 2020-09-24 DIAGNOSIS — Z72.0 TOBACCO USE: ICD-10-CM

## 2020-09-24 DIAGNOSIS — Z79.4 TYPE 2 DIABETES MELLITUS WITHOUT COMPLICATION, WITH LONG-TERM CURRENT USE OF INSULIN (HCC): ICD-10-CM

## 2020-09-24 DIAGNOSIS — M65.332 TRIGGER MIDDLE FINGER OF LEFT HAND: ICD-10-CM

## 2020-09-24 DIAGNOSIS — E11.9 TYPE 2 DIABETES MELLITUS WITHOUT COMPLICATION, WITH LONG-TERM CURRENT USE OF INSULIN (HCC): ICD-10-CM

## 2020-09-24 DIAGNOSIS — G56.02 CARPAL TUNNEL SYNDROME OF LEFT WRIST: Primary | ICD-10-CM

## 2020-09-24 PROCEDURE — 99214 OFFICE O/P EST MOD 30 MIN: CPT | Performed by: ORTHOPAEDIC SURGERY

## 2020-09-24 NOTE — PROGRESS NOTES
"    [facility] [place] [facility] [place]        Subjective     CC: Follow-up of the Left Hand (2 weeks- CTS, trigger finger)      HPI    Mallika Brewster is a 55 y.o. female.  This the patient's first time seeing me for locking of the left long digit and numbness and tingling in the left hand.  Patient had been seen in [place] up to this point and had right long digit trigger finger release and right carpal tunnel release.  She was supposed to have the left side done a month later but her  got a brain tumor and they stopped all medical visits that did not pertain to him.  Since that time, she has had locking in the left long digit.  She wears a brace.  She is had injections in the past that helped temporarily.  She has had nerve studies in 2019.  She has used a brace and injections.  Symptoms are severe.    Overall, patient's symptoms are worsening.    ROS:    Constiutional:Pt denies fever, chills, nausea, or vomiting.  MSK:as above        Objective      Past Medical History  Past Medical History:   Diagnosis Date   • Acid reflux    • Arthritis    • Back pain    • Bronchitis    • Diabetes mellitus (CMS/HCC)    • High cholesterol    • Left hip pain          Physical Exam  Pulse 96   Ht 152.4 cm (60\")   Wt 65.7 kg (144 lb 13.5 oz)   SpO2 98%   BMI 28.29 kg/m²     Body mass index is 28.29 kg/m².    Patient is well nourished and well developed.        Ortho Exam  Patient has tenderness to palpation at the left long digit at the A1 pulley  Positive carpal tunnel compression test  Negative Phalen's  No thenar wasting  4+ out of 5 APB    Imaging/Labs/EMG Reviewed:  Imaging Results (Last 24 Hours)     ** No results found for the last 24 hours. **      We reviewed nerve studies brought in with the patient from [facility] dated 12/11/2019.  This is read as mild median neuropathy at the wrist bilaterally.  This is also interpreted of a chronic radiculopathy at C6 and C7 on the right.    Assessment  "   Assessment:  1. Carpal tunnel syndrome of left wrist    2. Trigger middle finger of left hand    3. Type 2 diabetes mellitus without complication, with long-term current use of insulin (CMS/[facility])    4. Tobacco use        Plan:  1. Recommend over the counter anti-inflammatories for pain and/or swelling  2. 55-year-old right-hand-dominant female with a chronic history of left long digit triggering as well as left carpal tunnel syndrome.  Patient has diabetes.  We have explained to her as well as we could this morning that given her diabetes, there is a chance that neuropathy is the explanation for her symptoms of numbness and tingling.  There is also a chance that surgery will provide no benefit of those symptoms.  Certainly the surgery should help with the triggering of the long digit.  The risk, benefits, potential hazards of left carpal tunnel release with left long digit trigger release were discussed with her.  She is well versed having gone through a similar type procedure on the contralateral side.  We will get this done as an outpatient in the near future.      Raymond Mosquera MD  09/24/20  08:33 ANA MARÍAT      Dragon disclaimer:  Much of this encounter note is an electronic transcription/translation of spoken language to printed text. The electronic translation of spoken language may permit erroneous, or at times, nonsensical words or phrases to be inadvertently transcribed; Although I have reviewed the note for such errors, some may still exist.

## 2020-09-24 NOTE — TELEPHONE ENCOUNTER
PT CALLED IN STATED SHE IS HAVING AN ORTHOPEDIC PROCEDURE ON 10/23/20 AND WAS ADVISED PER DR LINNEA LEES THAT PATIENT NEEDED A REFERRAL FOR SURGERY PLEASE ADVISE    [facility]  DR LINNEA LEES# [phone]

## 2020-10-20 ENCOUNTER — APPOINTMENT (OUTPATIENT)
Dept: PREADMISSION TESTING | Facility: HOSPITAL | Age: 55
End: 2020-10-20

## 2020-10-20 PROCEDURE — U0004 COV-19 TEST NON-CDC HGH THRU: HCPCS

## 2020-10-20 PROCEDURE — C9803 HOPD COVID-19 SPEC COLLECT: HCPCS

## 2020-10-21 LAB — SARS-COV-2 RNA RESP QL NAA+PROBE: NOT DETECTED

## 2020-10-23 ENCOUNTER — OUTSIDE FACILITY SERVICE (OUTPATIENT)
Dept: ORTHOPEDIC SURGERY | Facility: CLINIC | Age: 55
End: 2020-10-23

## 2020-10-23 PROCEDURE — 64721 CARPAL TUNNEL SURGERY: CPT | Performed by: ORTHOPAEDIC SURGERY

## 2020-10-23 PROCEDURE — 26055 INCISE FINGER TENDON SHEATH: CPT | Performed by: ORTHOPAEDIC SURGERY

## 2020-11-02 ENCOUNTER — APPOINTMENT (OUTPATIENT)
Dept: PREADMISSION TESTING | Facility: HOSPITAL | Age: 55
End: 2020-11-02

## 2020-11-02 LAB
ANION GAP SERPL CALCULATED.3IONS-SCNC: 9 MMOL/L (ref 5–15)
BUN SERPL-MCNC: 15 MG/DL (ref 6–20)
BUN/CREAT SERPL: 19.7 (ref 7–25)
CALCIUM SPEC-SCNC: 10.1 MG/DL (ref 8.6–10.5)
CHLORIDE SERPL-SCNC: 102 MMOL/L (ref 98–107)
CO2 SERPL-SCNC: 27 MMOL/L (ref 22–29)
CREAT SERPL-MCNC: 0.76 MG/DL (ref 0.57–1)
DEPRECATED RDW RBC AUTO: 39.8 FL (ref 37–54)
ERYTHROCYTE [DISTWIDTH] IN BLOOD BY AUTOMATED COUNT: 11.9 % (ref 12.3–15.4)
GFR SERPL CREATININE-BSD FRML MDRD: 80 ML/MIN/1.73
GFR SERPL CREATININE-BSD FRML MDRD: 96 ML/MIN/1.73
GLUCOSE SERPL-MCNC: 126 MG/DL (ref 65–99)
HCT VFR BLD AUTO: 43.5 % (ref 34–46.6)
HGB BLD-MCNC: 14.4 G/DL (ref 12–15.9)
MCH RBC QN AUTO: 30.2 PG (ref 26.6–33)
MCHC RBC AUTO-ENTMCNC: 33.1 G/DL (ref 31.5–35.7)
MCV RBC AUTO: 91.2 FL (ref 79–97)
PLATELET # BLD AUTO: 341 10*3/MM3 (ref 140–450)
PMV BLD AUTO: 9.7 FL (ref 6–12)
POTASSIUM SERPL-SCNC: 4.4 MMOL/L (ref 3.5–5.2)
QT INTERVAL: 366 MS
QTC INTERVAL: 440 MS
RBC # BLD AUTO: 4.77 10*6/MM3 (ref 3.77–5.28)
SARS-COV-2 RNA RESP QL NAA+PROBE: NOT DETECTED
SODIUM SERPL-SCNC: 138 MMOL/L (ref 136–145)
WBC # BLD AUTO: 6.69 10*3/MM3 (ref 3.4–10.8)

## 2020-11-02 PROCEDURE — C9803 HOPD COVID-19 SPEC COLLECT: HCPCS

## 2020-11-02 PROCEDURE — U0004 COV-19 TEST NON-CDC HGH THRU: HCPCS

## 2020-11-02 PROCEDURE — 85027 COMPLETE CBC AUTOMATED: CPT | Performed by: SURGERY

## 2020-11-02 PROCEDURE — 93005 ELECTROCARDIOGRAM TRACING: CPT

## 2020-11-02 PROCEDURE — 80048 BASIC METABOLIC PNL TOTAL CA: CPT | Performed by: SURGERY

## 2020-11-02 PROCEDURE — 36415 COLL VENOUS BLD VENIPUNCTURE: CPT

## 2020-11-02 PROCEDURE — 93010 ELECTROCARDIOGRAM REPORT: CPT | Performed by: INTERNAL MEDICINE

## 2020-11-05 ENCOUNTER — LAB REQUISITION (OUTPATIENT)
Dept: LAB | Facility: HOSPITAL | Age: 55
End: 2020-11-05

## 2020-11-05 DIAGNOSIS — D17.0 BENIGN LIPOMATOUS NEOPLASM OF SKIN AND SUBCUTANEOUS TISSUE OF HEAD, FACE AND NECK: ICD-10-CM

## 2020-11-05 PROCEDURE — 88304 TISSUE EXAM BY PATHOLOGIST: CPT | Performed by: SURGERY

## 2020-11-06 LAB
CYTO UR: NORMAL
LAB AP CASE REPORT: NORMAL
LAB AP CLINICAL INFORMATION: NORMAL
LAB AP DIAGNOSIS COMMENT: NORMAL
PATH REPORT.FINAL DX SPEC: NORMAL
PATH REPORT.GROSS SPEC: NORMAL

## 2020-11-10 ENCOUNTER — OFFICE VISIT (OUTPATIENT)
Dept: ORTHOPEDIC SURGERY | Facility: CLINIC | Age: 55
End: 2020-11-10

## 2020-11-10 DIAGNOSIS — Z98.890 STATUS POST TRIGGER FINGER RELEASE: ICD-10-CM

## 2020-11-10 DIAGNOSIS — M65.332 TRIGGER MIDDLE FINGER OF LEFT HAND: ICD-10-CM

## 2020-11-10 DIAGNOSIS — G56.02 CARPAL TUNNEL SYNDROME OF LEFT WRIST: ICD-10-CM

## 2020-11-10 DIAGNOSIS — Z98.890 STATUS POST CARPAL TUNNEL RELEASE: Primary | ICD-10-CM

## 2020-11-10 PROCEDURE — 99024 POSTOP FOLLOW-UP VISIT: CPT | Performed by: PHYSICIAN ASSISTANT

## 2020-11-10 RX ORDER — METHOCARBAMOL 750 MG/1
TABLET, FILM COATED ORAL
COMMUNITY
Start: 2020-10-05

## 2020-11-10 RX ORDER — MELOXICAM 15 MG/1
TABLET ORAL
COMMUNITY
Start: 2020-10-05 | End: 2021-08-03 | Stop reason: SDUPTHER

## 2020-11-10 NOTE — PROGRESS NOTES
[facility] [place] [facility] Note        Subjective     Post-op (2 week S/P Left Carpal Tunnel Release, Left Long Digit Trigger Finger Release 10/23/20)       HPI    Mallika Brewster is a 55 y.o. female.  Patient presents for their initial postop visit following left carpal tunnel release along with left long finger A1 pulley release performed on the above date by Dr. Mosquera.    Patient currently endorses pain scale of 4/10.  Pain is localized to the incisions.  She feels a pulling sensation but this could be related to the sutures.  She reports that the numbness and tingling she was having prior to surgery has resolved with regards to the carpal tunnel release.  She has had no further locking to the long finger A1 pulley since surgery.    Patient denies any fever, chills, night sweats or other constitutional symptoms.        Objective      Physical Exam  There were no vitals taken for this visit.    There is no height or weight on file to calculate BMI.        Ortho Exam    Left upper Extremity:      Musculoskeletal     Inspection and Palpation:      Hand/Wrist:      Tenderness -positive incisional tenderness noted to the wrist as well as A1 pulley of the long finger    Swelling - minimal     ROM:  Slightly diminished but within normal limits.  Patient is able to make a loose composite fist.    Motor: Grossly intact radial, ulnar, median, AIN, PIN.    Sensory: Grossly intact to light touch radial, ulnar, median nerve distributions.    Vascular: 2+ radial pulse with brisk capillary refill noted and each digit.       Incision:  Healing appropriately with sutures in place.  No redness, warmth, drainage or evidence of infection.      Imaging Reviewed:  No new imaging today.      Assessment:  1. Status post carpal tunnel release    2. Carpal tunnel syndrome of left wrist    3. Status post trigger finger release    4. Trigger middle finger of left hand        Plan:  1. Status post left carpal tunnel release  and left long finger A1 pulley release, stable.  2. Sutures removed today from both areas.  3. Patient was taught home hand exercises.  She declined formal PT.  4. Recommend over-the-counter pain medication as needed.  5. Still to refrain from strenuous gripping, twisting and lifting more than 4 pounds until after her next postop visit.  6. Follow-up in 3 weeks for repeat evaluation, sooner if issues arise or symptoms worsen/change.  7. Questions and concerns answered.      Kourtney Penaloza PA-C  11/12/20  12:20 MATILDE Stephen disclaimer:  Much of this encounter note is an electronic transcription/translation of spoken language to printed text. The electronic translation of spoken language may permit erroneous, or at times, nonsensical words or phrases to be inadvertently transcribed; Although I have reviewed the note for such errors, some may still exist.

## 2020-11-17 ENCOUNTER — OFFICE VISIT (OUTPATIENT)
Dept: ORTHOPEDIC SURGERY | Facility: CLINIC | Age: 55
End: 2020-11-17

## 2020-11-17 DIAGNOSIS — Z98.890 STATUS POST CARPAL TUNNEL RELEASE: Primary | ICD-10-CM

## 2020-11-17 PROCEDURE — 99024 POSTOP FOLLOW-UP VISIT: CPT | Performed by: ORTHOPAEDIC SURGERY

## 2020-11-17 RX ORDER — CEPHALEXIN 500 MG/1
500 CAPSULE ORAL 3 TIMES DAILY
Qty: 15 CAPSULE | Refills: 0 | Status: SHIPPED | OUTPATIENT
Start: 2020-11-17 | End: 2020-11-22

## 2020-11-17 NOTE — PROGRESS NOTES
[facility] [place] [facility] [place]        Subjective     CC: No chief complaint on file.      HPI    Mallika Brewster is a 55 y.o. female. ***     Overall, patient's symptoms are ***    ROS:    Constiutional:Pt denies fever, chills, nausea, or vomiting.  MSK:as above        Objective      Past Medical History  Past Medical History:   Diagnosis Date   • Acid reflux    • Arthritis    • Back pain    • Bronchitis    • Diabetes mellitus (CMS/HCC)    • High cholesterol    • Left hip pain          Physical Exam  There were no vitals taken for this visit.    There is no height or weight on file to calculate BMI.    Patient is well nourished and well developed.        Ortho Exam  ***    Imaging/Labs/EMG Reviewed:  Imaging Results (Last 24 Hours)     ** No results found for the last 24 hours. **            Assessment    Assessment:  1. Status post carpal tunnel release        Plan:  1. Recommend over the counter anti-inflammatories for pain and/or swelling  2. ***      Torrie Marcelo V  11/17/20  09:18 EST      Dragon disclaimer:  Much of this encounter note is an electronic transcription/translation of spoken language to printed text. The electronic translation of spoken language may permit erroneous, or at times, nonsensical words or phrases to be inadvertently transcribed; Although I have reviewed the note for such errors, some may still exist.

## 2020-11-17 NOTE — PROGRESS NOTES
[facility] [place] [facility] [place]        Subjective     Post-op Follow-up (1 week f/u; 3 week S/P Left Carpal Tunnel Release, Left Long Digit Trigger Finger Release 10/23/20)       NIALL Brewster is a 55 y.o. female.  Patient is here today being worked in because she called complaining of some drainage from her carpal tunnel incision.  She is status post left carpal tunnel release and left long digit trigger finger release on 10/23/2020.  She denies fever chills nausea vomiting.          Objective      Physical Exam  There were no vitals taken for this visit.    There is no height or weight on file to calculate BMI.        Ortho Exam  Incision has some mild crusting.  No drainage noted.  No induration or erythema.    Imaging Reviewed:  Imaging Results (Last 24 Hours)     ** No results found for the last 24 hours. **            Assessment    Assessment:  1. Status post carpal tunnel release        Plan:  1. Status post carpal tunnel release with superficial wound infection--mild overall.  We will protect her with some Keflex.  We talked about the need for some form of acidophilus.  We will see her back routinely in 3 weeks but have told her to call us or come back if she experiences any increase in drainage, pain, or redness.      Raymond Mosquera MD  11/17/20  09:35 EST      Dragon disclaimer:  Much of this encounter note is an electronic transcription/translation of spoken language to printed text. The electronic translation of spoken language may permit erroneous, or at times, nonsensical words or phrases to be inadvertently transcribed; Although I have reviewed the note for such errors, some may still exist.

## 2020-11-18 RX ORDER — FAMOTIDINE 40 MG/1
TABLET, FILM COATED ORAL
Qty: 60 TABLET | Refills: 11 | Status: SHIPPED | OUTPATIENT
Start: 2020-11-18

## 2020-12-01 ENCOUNTER — OFFICE VISIT (OUTPATIENT)
Dept: ORTHOPEDIC SURGERY | Facility: CLINIC | Age: 55
End: 2020-12-01

## 2020-12-01 DIAGNOSIS — Z98.890 STATUS POST TRIGGER FINGER RELEASE: ICD-10-CM

## 2020-12-01 DIAGNOSIS — Z98.890 STATUS POST CARPAL TUNNEL RELEASE: Primary | ICD-10-CM

## 2020-12-01 DIAGNOSIS — G56.02 CARPAL TUNNEL SYNDROME OF LEFT WRIST: ICD-10-CM

## 2020-12-01 DIAGNOSIS — M65.332 TRIGGER MIDDLE FINGER OF LEFT HAND: ICD-10-CM

## 2020-12-01 PROCEDURE — 99024 POSTOP FOLLOW-UP VISIT: CPT | Performed by: PHYSICIAN ASSISTANT

## 2020-12-01 NOTE — PROGRESS NOTES
[facility] [place] [facility]        Subjective     Follow-up (2 weeks follow up; 5 weeks Status post carpal tunnel release)       HPI    Mallika Brewster is a 55 y.o. female.  Patient returns for follow-up evaluation status post left carpal tunnel release and left middle finger A1 pulley release performed on 10/23/2020 by Dr. Mosquera.  After her last appointment with me she returned 1 week later and was diagnosed with superficial wound infection, drainage from the carpal tunnel incision.  She was provided Keflex and completed it without issue.    At this time she still has 4/10 pain along the carpal tunnel incision.  All numbness and tingling has resolved.  She is not having any locking to the middle finger A1 pulley.    Patient denies any fever, chills, night sweats or other constitutional symptoms.        Objective      Physical Exam  There were no vitals taken for this visit.    There is no height or weight on file to calculate BMI.        Ortho Exam     Left upper Extremity:       Musculoskeletal                 Inspection and Palpation:                  Hand/Wrist:                              Tenderness -still with incisional tenderness the wrist.  No pain or tenderness noted to the A1 pulley incision.                            Swelling -none                                 ROM:   Full range of motion of the wrist without any restrictions or limitations.  Patient is also able to make composite fist.                          Motor/sensory: Grossly intact C5-T1.                          Vascular: 2+ radial pulse with brisk capillary refill noted and each digit.                                        Incision:   Left middle finger A1 pulley incision well-healed without redness, warmth, drainage or evidence of infection.  Left carpal tunnel incision small eschar noted directly over incision otherwise no redness, warmth, drainage or evidence of infection.    No locking, catching or triggering noted  to the left middle finger       Imaging Reviewed:  No new imaging today.      Assessment:  1. Status post carpal tunnel release    2. Carpal tunnel syndrome of left wrist    3. Status post trigger finger release    4. Trigger middle finger of left hand        Plan:  1. Status post left carpal tunnel release and left long finger A1 pulley release, stable.  2. Patient to continue with home hand exercises.  She once again politely declines formal PT/OT.  3. Allow the eschar to fall off on its own.  Until it is completely gone no submersion of the hand.  4. Recommend over-the-counter pain medication as needed.  5. May gradually advance activity as tolerated.  6. Follow-up as needed.  7. Questions and concerns answered.      Kourtney Penaloza PA-C  12/01/20  15:22 EST      Dragon disclaimer:  Much of this encounter note is an electronic transcription/translation of spoken language to printed text. The electronic translation of spoken language may permit erroneous, or at times, nonsensical words or phrases to be inadvertently transcribed; Although I have reviewed the note for such errors, some may still exist.

## 2021-01-11 ENCOUNTER — OFFICE VISIT (OUTPATIENT)
Dept: INTERNAL MEDICINE | Facility: CLINIC | Age: 56
End: 2021-01-11

## 2021-01-11 VITALS
OXYGEN SATURATION: 97 % | HEART RATE: 89 BPM | WEIGHT: 145 LBS | DIASTOLIC BLOOD PRESSURE: 78 MMHG | TEMPERATURE: 97.1 F | BODY MASS INDEX: 28.47 KG/M2 | RESPIRATION RATE: 16 BRPM | HEIGHT: 60 IN | SYSTOLIC BLOOD PRESSURE: 114 MMHG

## 2021-01-11 DIAGNOSIS — Z79.4 TYPE 2 DIABETES MELLITUS WITHOUT COMPLICATION, WITH LONG-TERM CURRENT USE OF INSULIN (HCC): Primary | ICD-10-CM

## 2021-01-11 DIAGNOSIS — E55.9 VITAMIN D DEFICIENCY: ICD-10-CM

## 2021-01-11 DIAGNOSIS — E78.2 MIXED HYPERLIPIDEMIA: ICD-10-CM

## 2021-01-11 DIAGNOSIS — F41.8 DEPRESSION WITH ANXIETY: ICD-10-CM

## 2021-01-11 DIAGNOSIS — E11.9 TYPE 2 DIABETES MELLITUS WITHOUT COMPLICATION, WITH LONG-TERM CURRENT USE OF INSULIN (HCC): Primary | ICD-10-CM

## 2021-01-11 DIAGNOSIS — I10 ESSENTIAL HYPERTENSION: ICD-10-CM

## 2021-01-11 PROCEDURE — 99214 OFFICE O/P EST MOD 30 MIN: CPT | Performed by: NURSE PRACTITIONER

## 2021-01-11 RX ORDER — FLUOXETINE HYDROCHLORIDE 40 MG/1
40 CAPSULE ORAL DAILY
Qty: 90 CAPSULE | Refills: 1 | Status: SHIPPED | OUTPATIENT
Start: 2021-01-11 | End: 2021-03-01 | Stop reason: SDUPTHER

## 2021-01-11 RX ORDER — PROPRANOLOL HYDROCHLORIDE 20 MG/1
TABLET ORAL
Qty: 60 TABLET | Refills: 2 | Status: SHIPPED | OUTPATIENT
Start: 2021-01-11

## 2021-01-11 NOTE — PROGRESS NOTES
Immediate Brief Procedure Note  Patient Name:  Vasquez Posey  YOB: 1947  DATE OF PROCEDURE : 3/30/2020  PROCEDURALIST: Paco Gustafson MD  ASSISTANT(S):  None  ANESTHESIA TYPE: Moderate  ANESTHESIOLOGIST:  None    PROCEDURE PERFORMED: Cholecystostomy Tube Placement    Pre-procedure Dx:   1. Acute cholecystitis        Post-procedure Dx: Same    Findings: Cholecystostomy Tube Placement    Estimated Blood Loss: Less than 5ml.    Complications: None.    Specimens Removed: yes   Subjective   Chief Complaint   Patient presents with   • Diabetes     f/u      Mallika Brewster is a 55 y.o. female here today for diabetes, hypertension, hyperlipidemia, depression, and anxiety.  Blood pressure stable and at goal.  No headaches, changes in vision, shortness of breath, or chest pain.  Blood sugars have been doing well.  She is following a heart healthy low-cholesterol diabetic diet.  She is trying to be physically active.  Depression and anxiety have been a bit worse lately.  She cares for her adult son which has been diagnosed with schizophrenia.  She also cares for her  who Asperger syndrome and brain tumor removed last year. He has memory loss and agitation. This has been difficult for her to manage. She feels very irritated and frustrated all of the time with sadness and feeling overwhelmed. Prozac greatly helped at first. When she becomes upset she has increased heart rate, blood pressure, and shakiness. No thoughts of self harm or harming others. Seroquel is helping with sleep at bedtime. She would like to see therapist and is agreeable to meeting with behavioral health specialist first and then determine treatment plan.     I have reviewed the following portions of the patient's history and confirmed they are accurate: allergies, current medications, past family history, past medical history, past social history, past surgical history and problem list    I have personally completed the patient's review of systems.    Review of Systems   Constitutional: Positive for fatigue. Negative for activity change, appetite change, chills, diaphoresis, fever, unexpected weight gain and unexpected weight loss.   HENT: Negative for congestion, ear discharge, ear pain, mouth sores, nosebleeds, sinus pressure, sneezing and sore throat.    Eyes: Negative for pain, discharge and itching.   Respiratory: Negative for cough, chest tightness, shortness of breath and wheezing.    Cardiovascular: Negative  for chest pain, palpitations and leg swelling.   Gastrointestinal: Negative for abdominal pain, constipation, diarrhea, nausea, vomiting, GERD and indigestion.        Colonoscopy - 2017 - normal   Endocrine: Negative for heat intolerance, polydipsia, polyphagia and polyuria.   Genitourinary: Negative for dysuria, flank pain, frequency, hematuria and urgency.   Musculoskeletal: Positive for arthralgias, back pain, myalgias and neck stiffness. Negative for gait problem, joint swelling and neck pain.   Skin: Negative for color change, pallor and rash.   Allergic/Immunologic: Negative for environmental allergies and immunocompromised state.   Neurological: Negative for seizures, speech difficulty, weakness and numbness.   Hematological: Negative for adenopathy.   Psychiatric/Behavioral: Positive for sleep disturbance, depressed mood and stress. Negative for agitation, decreased concentration and suicidal ideas. The patient is nervous/anxious.        Current Outpatient Medications on File Prior to Visit   Medication Sig   • buPROPion XL (WELLBUTRIN XL) 150 MG 24 hr tablet Take 1 tablet by mouth Daily.   • esomeprazole (nexIUM) 40 MG capsule Take 1 capsule by mouth 2 (Two) Times a Day.   • famotidine (PEPCID) 40 MG tablet TAKE 1 TABLET TWICE A DAY AS NEEDED FOR HEARTBURN   • JANUVIA 50 MG tablet TAKE 1 TABLET DAILY   • linaclotide (LINZESS) 145 MCG capsule capsule Take 145 mcg by mouth Daily.   • losartan (COZAAR) 25 MG tablet Take 1 tablet by mouth Daily.   • meloxicam (MOBIC) 15 MG tablet    • metFORMIN (GLUCOPHAGE) 1000 MG tablet    • methocarbamol (ROBAXIN) 750 MG tablet    • montelukast (SINGULAIR) 10 MG tablet Take 1 tablet by mouth Daily.   • nabumetone (RELAFEN) 500 MG tablet    • QUEtiapine (SEROquel) 25 MG tablet Take 1-2 tablets 30 minutes before bedtime as needed for sleep.   • simvastatin (ZOCOR) 20 MG tablet Take 1 tablet by mouth Daily.   • sucralfate (CARAFATE) 1 g tablet Take 4 tablets by mouth Daily.   •  "vitamin D (ERGOCALCIFEROL) 1.25 MG (56566 UT) capsule capsule Take 1 capsule by mouth 1 (One) Time Per Week.   • glucose blood (Glucose Meter Test) test strip Use as instructed to check blood glucose levels 3 times daily   • glucose monitor monitoring kit 1 each As Needed (Check blood sugars 3 times daily PRN.).   • Lancets misc Use as instructed to test blood glucose levels 3 times daily.     No current facility-administered medications on file prior to visit.        Objective   Vitals:    01/11/21 1047   BP: 114/78   Pulse: 89   Resp: 16   Temp: 97.1 °F (36.2 °C)   TempSrc: Temporal   SpO2: 97%   Weight: 65.8 kg (145 lb)   Height: 152.4 cm (60\")     Body mass index is 28.32 kg/m².    Physical Exam  Vitals signs reviewed.   Constitutional:       Appearance: Normal appearance. She is well-developed.   HENT:      Head: Normocephalic and atraumatic.      Nose: Nose normal.   Eyes:      General: Lids are normal.      Conjunctiva/sclera: Conjunctivae normal.      Pupils: Pupils are equal, round, and reactive to light.   Neck:      Thyroid: No thyromegaly.      Trachea: Trachea normal.   Pulmonary:      Effort: Pulmonary effort is normal. No respiratory distress.   Musculoskeletal:      Cervical back: She exhibits tenderness.   Skin:     General: Skin is warm and dry.   Neurological:      Mental Status: She is alert and oriented to person, place, and time.      GCS: GCS eye subscore is 4. GCS verbal subscore is 5. GCS motor subscore is 6.   Psychiatric:         Attention and Perception: Attention normal.         Mood and Affect: Mood is anxious and depressed.         Speech: Speech normal.         Behavior: Behavior normal. Behavior is cooperative.         Assessment/Plan   Problem List Items Addressed This Visit        Cardiac and Vasculature    Essential hypertension  Chronic issue stable requiring medication management and monitoring. Will eat well balanced heart healthy diet, drink adequate water, increase physical " activity, and get adequate rest. Monitor blood pressures daily and contact office for any readings consistently above 140/90. Patient will report any associated symptoms such as headaches, blurry vision, or nausea. Patient will go to ER for any chest pressure or chest pain.   Continue losartan.    Relevant Medications    propranolol (INDERAL) 20 MG tablet    Other Relevant Orders    CBC (No Diff)    Comprehensive Metabolic Panel    Lipid Panel    Hyperlipidemia  Chronic unstable requiring medication management, lifestyle changes, and monitoring. Discussed how this being unstable increases risk of cardiovascular disease and adverse events. Will follow a hearth healthy diet low in cholesterol and fat. Discussed increasing fiber intake. Suggested fish oil or omega 3 supplement of 1200mg twice daily. Educated on how these help lower triglycerides and LDL. Will drink adequate water and increase physical activity as tolerated. Discussed importance of medication compliance.   Continue zocor    Relevant Orders    Lipid Panel       Endocrine and Metabolic    Type 2 diabetes mellitus without complication, with long-term current use of insulin (CMS/[facility]) - Primary  Chronic issue stable requiring medication management and monitoring. Will eat well balanced heart healthy diabetic diet, drink adequate water, increase physical activity, and get adequate rest. Will monitor blood sugars daily and keep log for next appt. Will contact office earlier if blood sugars are averaging above 250.   Continue Metformin and januvia    Relevant Orders    CBC (No Diff)    Comprehensive Metabolic Panel    Hemoglobin A1c    Vitamin D deficiency  Chronic issue requiring diet changes, monitoring, and dietary supplement. Will increase dietary intake of Vitamin D through dairy milk, plant/nut based milk, and fortified foods such as juice and cereal. Will start dietary supplement as directed.       Relevant Orders    Vitamin D 25 Hydroxy      Other Visit  Diagnoses     Depression with anxiety      Chronic issue unstable requiring medication management and monitoring. Will eat well balanced diet, increase water intake, increase physical activity during the day, and get adequate rest. Discussed relaxation and coping skills and exercises.   Increase prozac to 40mg daily and start propranolol PRN for anxiety. Referred to behavioral specialist.     Relevant Medications    propranolol (INDERAL) 20 MG tablet    FLUoxetine (PROzac) 40 MG capsule    Other Relevant Orders    Ambulatory Referral to Behavioral Health             Current Outpatient Medications:   •  buPROPion XL (WELLBUTRIN XL) 150 MG 24 hr tablet, Take 1 tablet by mouth Daily., Disp: 90 tablet, Rfl: 2  •  esomeprazole (nexIUM) 40 MG capsule, Take 1 capsule by mouth 2 (Two) Times a Day., Disp: 180 capsule, Rfl: 3  •  famotidine (PEPCID) 40 MG tablet, TAKE 1 TABLET TWICE A DAY AS NEEDED FOR HEARTBURN, Disp: 60 tablet, Rfl: 11  •  JANUVIA 50 MG tablet, TAKE 1 TABLET DAILY, Disp: 90 tablet, Rfl: 3  •  linaclotide (LINZESS) 145 MCG capsule capsule, Take 145 mcg by mouth Daily., Disp: , Rfl:   •  losartan (COZAAR) 25 MG tablet, Take 1 tablet by mouth Daily., Disp: , Rfl:   •  meloxicam (MOBIC) 15 MG tablet, , Disp: , Rfl:   •  metFORMIN (GLUCOPHAGE) 1000 MG tablet, , Disp: , Rfl:   •  methocarbamol (ROBAXIN) 750 MG tablet, , Disp: , Rfl:   •  montelukast (SINGULAIR) 10 MG tablet, Take 1 tablet by mouth Daily., Disp: , Rfl:   •  nabumetone (RELAFEN) 500 MG tablet, , Disp: , Rfl:   •  QUEtiapine (SEROquel) 25 MG tablet, Take 1-2 tablets 30 minutes before bedtime as needed for sleep., Disp: 180 tablet, Rfl: 2  •  simvastatin (ZOCOR) 20 MG tablet, Take 1 tablet by mouth Daily., Disp: , Rfl:   •  sucralfate (CARAFATE) 1 g tablet, Take 4 tablets by mouth Daily., Disp: , Rfl:   •  vitamin D (ERGOCALCIFEROL) 1.25 MG (28261 UT) capsule capsule, Take 1 capsule by mouth 1 (One) Time Per Week., Disp: 12 capsule, Rfl: 2  •   FLUoxetine (PROzac) 40 MG capsule, Take 1 capsule by mouth Daily., Disp: 90 capsule, Rfl: 1  •  glucose blood (Glucose Meter Test) test strip, Use as instructed to check blood glucose levels 3 times daily, Disp: 100 each, Rfl: 5  •  glucose monitor monitoring kit, 1 each As Needed (Check blood sugars 3 times daily PRN.)., Disp: 1 each, Rfl: 0  •  Lancets misc, Use as instructed to test blood glucose levels 3 times daily., Disp: 100 each, Rfl: 5  •  propranolol (INDERAL) 20 MG tablet, Take 1/2 to 1 tablet by mouth 3 times a day as needed for anxiety., Disp: 60 tablet, Rfl: 2       Plan of care reviewed with the patient at the conclusion of today's visit.  Education was provided regarding diagnosis, management, and any prescribed or recommended OTC medications.  Patient verbalized understanding of and agreement with management plan.     Return in about 3 months (around 4/11/2021), or if symptoms worsen or fail to improve.      Amanda Lagos, APRN    Please note that portions of this note were completed with a voice recognition program. Efforts were made to edit the dictations, but occasionally words are mistranscribed.

## 2021-01-12 ENCOUNTER — LAB (OUTPATIENT)
Dept: LAB | Facility: HOSPITAL | Age: 56
End: 2021-01-12

## 2021-01-12 LAB
25(OH)D3 SERPL-MCNC: 46 NG/ML (ref 30–100)
ALBUMIN SERPL-MCNC: 4.6 G/DL (ref 3.5–5.2)
ALBUMIN/GLOB SERPL: 1.6 G/DL
ALP SERPL-CCNC: 62 U/L (ref 39–117)
ALT SERPL W P-5'-P-CCNC: 40 U/L (ref 1–33)
ANION GAP SERPL CALCULATED.3IONS-SCNC: 10.2 MMOL/L (ref 5–15)
AST SERPL-CCNC: 27 U/L (ref 1–32)
BILIRUB SERPL-MCNC: 1.1 MG/DL (ref 0–1.2)
BUN SERPL-MCNC: 12 MG/DL (ref 6–20)
BUN/CREAT SERPL: 16.7 (ref 7–25)
CALCIUM SPEC-SCNC: 9.7 MG/DL (ref 8.6–10.5)
CHLORIDE SERPL-SCNC: 103 MMOL/L (ref 98–107)
CHOLEST SERPL-MCNC: 209 MG/DL (ref 0–200)
CO2 SERPL-SCNC: 25.8 MMOL/L (ref 22–29)
CREAT SERPL-MCNC: 0.72 MG/DL (ref 0.57–1)
DEPRECATED RDW RBC AUTO: 38.7 FL (ref 37–54)
ERYTHROCYTE [DISTWIDTH] IN BLOOD BY AUTOMATED COUNT: 12 % (ref 12.3–15.4)
GFR SERPL CREATININE-BSD FRML MDRD: 103 ML/MIN/1.73
GFR SERPL CREATININE-BSD FRML MDRD: 85 ML/MIN/1.73
GLOBULIN UR ELPH-MCNC: 2.8 GM/DL
GLUCOSE SERPL-MCNC: 107 MG/DL (ref 65–99)
HCT VFR BLD AUTO: 41.7 % (ref 34–46.6)
HDLC SERPL-MCNC: 55 MG/DL (ref 40–60)
HGB BLD-MCNC: 14.2 G/DL (ref 12–15.9)
LDLC SERPL CALC-MCNC: 121 MG/DL (ref 0–100)
LDLC/HDLC SERPL: 2.12 {RATIO}
MCH RBC QN AUTO: 30 PG (ref 26.6–33)
MCHC RBC AUTO-ENTMCNC: 34.1 G/DL (ref 31.5–35.7)
MCV RBC AUTO: 88.2 FL (ref 79–97)
PLATELET # BLD AUTO: 365 10*3/MM3 (ref 140–450)
PMV BLD AUTO: 10.1 FL (ref 6–12)
POTASSIUM SERPL-SCNC: 4.2 MMOL/L (ref 3.5–5.2)
PROT SERPL-MCNC: 7.4 G/DL (ref 6–8.5)
RBC # BLD AUTO: 4.73 10*6/MM3 (ref 3.77–5.28)
SODIUM SERPL-SCNC: 139 MMOL/L (ref 136–145)
TRIGL SERPL-MCNC: 188 MG/DL (ref 0–150)
VLDLC SERPL-MCNC: 33 MG/DL (ref 5–40)
WBC # BLD AUTO: 6.71 10*3/MM3 (ref 3.4–10.8)

## 2021-01-12 PROCEDURE — 80053 COMPREHEN METABOLIC PANEL: CPT | Performed by: NURSE PRACTITIONER

## 2021-01-12 PROCEDURE — 80061 LIPID PANEL: CPT | Performed by: NURSE PRACTITIONER

## 2021-01-12 PROCEDURE — 83036 HEMOGLOBIN GLYCOSYLATED A1C: CPT | Performed by: NURSE PRACTITIONER

## 2021-01-12 PROCEDURE — 85027 COMPLETE CBC AUTOMATED: CPT | Performed by: NURSE PRACTITIONER

## 2021-01-12 PROCEDURE — 82306 VITAMIN D 25 HYDROXY: CPT | Performed by: NURSE PRACTITIONER

## 2021-01-13 LAB — HBA1C MFR BLD: 6.6 % (ref 4.8–5.6)

## 2021-01-21 NOTE — PROGRESS NOTES
My chart message:    Blood sugar look better. Liver enzymes look better. Triglycerides are better but cholesterol is a bit more elevated so follow a low cholesterol/fat diet.

## 2021-02-01 ENCOUNTER — TELEMEDICINE (OUTPATIENT)
Dept: PSYCHIATRY | Facility: CLINIC | Age: 56
End: 2021-02-01

## 2021-02-01 DIAGNOSIS — F33.0 MILD EPISODE OF RECURRENT MAJOR DEPRESSIVE DISORDER (HCC): ICD-10-CM

## 2021-02-01 DIAGNOSIS — F41.1 GENERALIZED ANXIETY DISORDER: Primary | ICD-10-CM

## 2021-02-01 PROCEDURE — 90792 PSYCH DIAG EVAL W/MED SRVCS: CPT | Performed by: NURSE PRACTITIONER
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
## 2021-02-17 RX ORDER — QUETIAPINE FUMARATE 25 MG/1
TABLET, FILM COATED ORAL
Qty: 180 TABLET | Refills: 3 | Status: SHIPPED | OUTPATIENT
Start: 2021-02-17 | End: 2021-07-27 | Stop reason: SDUPTHER

## 2021-02-23 ENCOUNTER — OFFICE VISIT (OUTPATIENT)
Dept: ORTHOPEDIC SURGERY | Facility: CLINIC | Age: 56
End: 2021-02-23

## 2021-02-23 VITALS — HEIGHT: 60 IN | HEART RATE: 93 BPM | WEIGHT: 140 LBS | OXYGEN SATURATION: 99 % | BODY MASS INDEX: 27.48 KG/M2

## 2021-02-23 DIAGNOSIS — Z98.890 STATUS POST CARPAL TUNNEL RELEASE: Primary | ICD-10-CM

## 2021-02-23 PROCEDURE — 99213 OFFICE O/P EST LOW 20 MIN: CPT | Performed by: ORTHOPAEDIC SURGERY

## 2021-02-23 NOTE — PROGRESS NOTES
"    [facility]        Subjective     CC: Pain of the Left Wrist (4 months status post left carpal tunnel release 10/23/20)      HPI    Mallika Brewster is a 56 y.o. female.  Patient is here now 4 months out from left carpal tunnel release on 10/23/2020.  Patient did not do any physical therapy after surgery.  She is having little bit of wrist pain and some incisional pain.    Overall, patient's symptoms are improving but not as good as the contralateral side.    ROS:    Constiutional:Pt denies fever, chills, nausea, or vomiting.  MSK:as above        Objective      Past Medical History  Past Medical History:   Diagnosis Date   • Acid reflux    • Arthritis    • Back pain    • Bronchitis    • Diabetes mellitus (CMS/HCC)    • High cholesterol    • Left hip pain          Physical Exam  Pulse 93   Ht 152.4 cm (60\")   Wt 63.5 kg (140 lb)   SpO2 99%   BMI 27.34 kg/m²     Body mass index is 27.34 kg/m².    Patient is well nourished and well developed.        Ortho Exam  Patient has full wrist and forearm range of motion.  Full digital range of motion.  The incision is benign in appearance.  Mild tenderness to palpation.  No fluctuance noted.    Imaging/Labs/EMG Reviewed:  Imaging Results (Last 24 Hours)     ** No results found for the last 24 hours. **            Assessment    Assessment:  1. Status post carpal tunnel release        Plan:  1. Recommend over the counter anti-inflammatories for pain and/or swelling  2. Pillar pain status post carpal tunnel release--we recommended a course of physical therapy and aggressive scar massage and desensitization.  She will try this at home is much as possible.  We have reassured her overall.  Follow-up as needed      Raymond Mosquera MD  02/23/21  13:25 MATILDE Stephen disclaimer:  Much of this encounter note is an electronic transcription/translation of spoken language to printed text. The electronic translation of spoken language may permit " erroneous, or at times, nonsensical words or phrases to be inadvertently transcribed; Although I have reviewed the note for such errors, some may still exist.

## 2021-03-01 ENCOUNTER — TELEMEDICINE (OUTPATIENT)
Dept: PSYCHIATRY | Facility: CLINIC | Age: 56
End: 2021-03-01

## 2021-03-01 DIAGNOSIS — F33.0 MILD EPISODE OF RECURRENT MAJOR DEPRESSIVE DISORDER (HCC): Chronic | ICD-10-CM

## 2021-03-01 DIAGNOSIS — F41.1 GENERALIZED ANXIETY DISORDER: Primary | Chronic | ICD-10-CM

## 2021-03-01 DIAGNOSIS — G47.9 SLEEPING DIFFICULTIES: ICD-10-CM

## 2021-03-01 PROCEDURE — 99214 OFFICE O/P EST MOD 30 MIN: CPT | Performed by: NURSE PRACTITIONER

## 2021-03-01 RX ORDER — BUSPIRONE HYDROCHLORIDE 5 MG/1
5 TABLET ORAL 3 TIMES DAILY
Qty: 90 TABLET | Refills: 0 | Status: SHIPPED | OUTPATIENT
Start: 2021-03-01 | End: 2021-03-29 | Stop reason: SDUPTHER

## 2021-03-01 RX ORDER — FLUOXETINE HYDROCHLORIDE 40 MG/1
40 CAPSULE ORAL 2 TIMES DAILY
Qty: 60 CAPSULE | Refills: 0 | Status: SHIPPED | OUTPATIENT
Start: 2021-03-01 | End: 2021-03-29 | Stop reason: SDUPTHER

## 2021-03-01 NOTE — TREATMENT PLAN
Multi-Disciplinary Problems (from Behavioral Health Treatment Plan)    Active Problems     Problem: Anxiety  Start Date: 03/01/21    Problem Details: The patient self-scales this problem as a 6 with 10 being the worst.        Goal Priority Start Date Expected End Date End Date    Patient will develop and implement behavioral and cognitive strategies to reduce anxiety and irrational fears. -- 03/01/21 -- --    Goal Details: Progress toward goal:  Not appropriate to rate progress toward goal since this is the initial treatment plan.        Goal Intervention Frequency Start Date End Date    Help patient explore past emotional issues in relation to present anxiety. Q Month 03/01/21 --    Intervention Details: Duration of treatment until until remission of symptoms.        Goal Intervention Frequency Start Date End Date    Help patient develop an awareness of their cognitive and physical responses to anxiety. Q Month 03/01/21 --    Intervention Details: Duration of treatment until until remission of symptoms.              Problem: Depression  Start Date: 03/01/21    Problem Details: The patient self-scales this problem as a 4 with 10 being the worst.        Goal Priority Start Date Expected End Date End Date    Patient will demonstrate the ability to initiate new constructive life skills outside of sessions on a consistent basis. -- 03/01/21 -- --    Goal Details: Progress toward goal:  Not appropriate to rate progress toward goal since this is the initial treatment plan.        Goal Intervention Frequency Start Date End Date    Assist patient in setting attainable activities of daily living goals. PRN 03/01/21 --    Goal Intervention Frequency Start Date End Date    Provide education about depression Q Month 03/01/21 --    Intervention Details: Duration of treatment until until remission of symptoms.        Goal Intervention Frequency Start Date End Date    Assist patient in developing healthy coping strategies. Q Month  03/01/21 --    Intervention Details: Duration of treatment until until remission of symptoms.                           I have discussed and reviewed this treatment plan with the patient and/or guardian.  The patient has verbally agreed with this treatment plan (no signatures are obtained at today's visit as the patient is a telehealth patient and is unable to print and sign this document, therefore verbal agreement is obtained).

## 2021-03-01 NOTE — PROGRESS NOTES
"This provider is located at the [facility] (through [facility]), [address], [place], [address] using a secure e[employer] Video Visit through [employer]. Patient is being seen remotely via telehealth at their home address in [place], and stated they are in a secure environment for this session. The patient's condition being diagnosed/treated is appropriate for telemedicine. The provider identified herself as well as her credentials. The patient, and/or patients guardian, consent to be seen remotely, and when consent is given they understand that the consent allows for patient identifiable information to be sent to a third party as needed. They may refuse to be seen remotely at any time. The electronic data is encrypted and password protected, and the patient and/or guardian has been advised of the potential risks to privacy not withstanding such measures.    You have chosen to receive care through a telehealth visit.  Do you consent to use a video/audio connection for your medical care today? Yes     Subjective   Mallika Brewster is a 56 y.o. female who is here today for medication management follow up.    Chief Complaint: Depression, anxiety, sleeping difficulties    History of Present Illness: The patient describes her mood as \"impatient\" over the last few weeks. Patient states that she hasn't noticed a big difference in her anxiety and stress since increasing the Prozac. Patient states that since it is has only been one month since she increased her dose, she would like to wait a little longer before discussing changing this medication.  The patient reports her appetite as good.  The patient reports her sleep as good.  The patient denies any nightmares or bad dreams.  Patient states that she is sleeping okay with the Seroquel.  Patient states she still awakens easily at night, even with the medication. Patient states that she missed a dose one night and did not sleep " well at all without it.  The patient denies any new medical problems or changes in medications since last appointment with this facility.  The patient reports compliance with current medication regimen.  The patient denies any current side effects from her current medication regimen.  The patient denies any abnormal muscle movements or tics.  The patient rates her depression at a 4/10 on a 0-10 scale, with 10 being the worst.  The patient rates her anxiety at a 6/10 on a 0-10 scale, with 10 being the worst.  The patient rates hopelessness at a 0/10 on a 0-10 scale with 10 being the worst.  The patient denies suicidal ideations and homicidal ideations, and is convincing.  The patient denies any auditory hallucinations or visual hallucinations.  The patient does not endorse significant symptoms consistent with bipolar disorder or a psychotic illness.                          The patient denies any chance of pregnancy at this time.  The patient was educated that her prescribed medications can have potential risk to a developing fetus. The patient is advised to contact this APRN/this office if she becomes pregnant or plans to become pregnant.  Pt verbalizes understanding and acknowledged agreement with this plan in her own words.    The following portions of the patient's history were reviewed and updated as appropriate: allergies, current medications, past family history, past medical history, past social history, past surgical history and problem list.    Review of Systems   Constitutional: Negative for activity change, appetite change, fatigue and unexpected weight change.   Psychiatric/Behavioral: Positive for decreased concentration and dysphoric mood. Negative for agitation, behavioral problems, confusion, hallucinations, self-injury, sleep disturbance and suicidal ideas. The patient is nervous/anxious. The patient is not hyperactive.        Objective   Physical Exam  Constitutional:       Appearance: Normal  appearance.   Neurological:      Mental Status: She is alert.   Psychiatric:         Attention and Perception: Attention and perception normal.         Mood and Affect: Affect normal. Mood is anxious.         Speech: Speech normal.         Behavior: Behavior normal. Behavior is cooperative.         Thought Content: Thought content normal. Thought content does not include homicidal or suicidal ideation. Thought content does not include homicidal or suicidal plan.         Cognition and Memory: Cognition and memory normal.         Judgment: Judgment normal.       not currently breastfeeding.    The patient was seen remotely today via a MyChart Video Visit through [facility].  Unable to obtain vital signs due to nature of remote visit.  Height stated at 60 inches.  Weight stated at 140 pounds.     No Known Allergies    Recent Results (from the past 2016 hour(s))   CBC (No Diff)    Collection Time: 01/12/21 11:16 AM    Specimen: Blood   Result Value Ref Range    WBC 6.71 3.40 - 10.80 10*3/mm3    RBC 4.73 3.77 - 5.28 10*6/mm3    Hemoglobin 14.2 12.0 - 15.9 g/dL    Hematocrit 41.7 34.0 - 46.6 %    MCV 88.2 79.0 - 97.0 fL    MCH 30.0 26.6 - 33.0 pg    MCHC 34.1 31.5 - 35.7 g/dL    RDW 12.0 (L) 12.3 - 15.4 %    RDW-SD 38.7 37.0 - 54.0 fl    MPV 10.1 6.0 - 12.0 fL    Platelets 365 140 - 450 10*3/mm3   Comprehensive Metabolic Panel    Collection Time: 01/12/21 11:16 AM    Specimen: Blood   Result Value Ref Range    Glucose 107 (H) 65 - 99 mg/dL    BUN 12 6 - 20 mg/dL    Creatinine 0.72 0.57 - 1.00 mg/dL    Sodium 139 136 - 145 mmol/L    Potassium 4.2 3.5 - 5.2 mmol/L    Chloride 103 98 - 107 mmol/L    CO2 25.8 22.0 - 29.0 mmol/L    Calcium 9.7 8.6 - 10.5 mg/dL    Total Protein 7.4 6.0 - 8.5 g/dL    Albumin 4.60 3.50 - 5.20 g/dL    ALT (SGPT) 40 (H) 1 - 33 U/L    AST (SGOT) 27 1 - 32 U/L    Alkaline Phosphatase 62 39 - 117 U/L    Total Bilirubin 1.1 0.0 - 1.2 mg/dL    eGFR Non African Amer 85 >60 mL/min/1.73    eGFR  African Amer 103  >60 mL/min/1.73    Globulin 2.8 gm/dL    A/G Ratio 1.6 g/dL    BUN/Creatinine Ratio 16.7 7.0 - 25.0    Anion Gap 10.2 5.0 - 15.0 mmol/L   Lipid Panel    Collection Time: 01/12/21 11:16 AM    Specimen: Blood   Result Value Ref Range    Total Cholesterol 209 (H) 0 - 200 mg/dL    Triglycerides 188 (H) 0 - 150 mg/dL    HDL Cholesterol 55 40 - 60 mg/dL    LDL Cholesterol  121 (H) 0 - 100 mg/dL    VLDL Cholesterol 33 5 - 40 mg/dL    LDL/HDL Ratio 2.12    Hemoglobin A1c    Collection Time: 01/12/21 11:16 AM    Specimen: Blood   Result Value Ref Range    Hemoglobin A1C 6.60 (H) 4.80 - 5.60 %   Vitamin D 25 Hydroxy    Collection Time: 01/12/21 11:16 AM    Specimen: Blood   Result Value Ref Range    25 Hydroxy, Vitamin D 46.0 30.0 - 100.0 ng/ml       Current Medications:   Current Outpatient Medications   Medication Sig Dispense Refill   • esomeprazole (nexIUM) 40 MG capsule Take 1 capsule by mouth 2 (Two) Times a Day. 180 capsule 3   • famotidine (PEPCID) 40 MG tablet TAKE 1 TABLET TWICE A DAY AS NEEDED FOR HEARTBURN 60 tablet 11   • FLUoxetine (PROzac) 40 MG capsule Take 1 capsule by mouth 2 (Two) Times a Day. 60 capsule 0   • glucose blood (Glucose Meter Test) test strip Use as instructed to check blood glucose levels 3 times daily 100 each 5   • glucose monitor monitoring kit 1 each As Needed (Check blood sugars 3 times daily PRN.). 1 each 0   • JANUVIA 50 MG tablet TAKE 1 TABLET DAILY 90 tablet 3   • Lancets misc Use as instructed to test blood glucose levels 3 times daily. 100 each 5   • linaclotide (LINZESS) 145 MCG capsule capsule Take 145 mcg by mouth Daily.     • losartan (COZAAR) 25 MG tablet Take 1 tablet by mouth Daily.     • meloxicam (MOBIC) 15 MG tablet      • metFORMIN (GLUCOPHAGE) 1000 MG tablet      • methocarbamol (ROBAXIN) 750 MG tablet      • montelukast (SINGULAIR) 10 MG tablet Take 1 tablet by mouth Daily.     • nabumetone (RELAFEN) 500 MG tablet      • propranolol (INDERAL) 20 MG tablet Take 1/2 to 1  tablet by mouth 3 times a day as needed for anxiety. 60 tablet 2   • QUEtiapine (SEROquel) 25 MG tablet TAKE 1 TO 2 TABLETS 30 MINUTES BEFORE BEDTIME AS NEEDED FOR SLEEP 180 tablet 3   • simvastatin (ZOCOR) 20 MG tablet Take 1 tablet by mouth Daily.     • sucralfate (CARAFATE) 1 g tablet Take 4 tablets by mouth Daily.     • vitamin D (ERGOCALCIFEROL) 1.25 MG (94325 UT) capsule capsule Take 1 capsule by mouth 1 (One) Time Per Week. 12 capsule 2   • busPIRone (BUSPAR) 5 MG tablet Take 1 tablet by mouth 3 (Three) Times a Day. 90 tablet 0     No current facility-administered medications for this visit.        Mental Status Exam:   Hygiene:   good  Cooperation:  Cooperative  Eye Contact:  Good  Psychomotor Behavior:  Appropriate  Affect:  Full range  Hopelessness: Denies  Speech:  Normal  Thought Process:  Goal directed  Thought Content:  Normal  Suicidal:  None  Homicidal:  None  Hallucinations:  None  Delusion:  None  Memory:  Intact  Orientation:  Person, Place, Time and Situation  Reliability:  good  Insight:  Good  Judgement:  Good  Impulse Control:  Good  Physical/Medical Issues:  Yes See medical history    PHQ-9 Depression Screening  Little interest or pleasure in doing things? 3   Feeling down, depressed, or hopeless? 2   Trouble falling or staying asleep, or sleeping too much? 0   Feeling tired or having little energy? 2   Poor appetite or overeating? 0   Feeling bad about yourself - or that you are a failure or have let yourself or your family down? 0   Trouble concentrating on things, such as reading the newspaper or watching television? 2   Moving or speaking so slowly that other people could have noticed? Or the opposite - being so fidgety or restless that you have been moving around a lot more than usual? 0   Thoughts that you would be better off dead, or of hurting yourself in some way? 0   PHQ-9 Total Score 9   If you checked off any problems, how difficult have these problems made it for you to do your  work, take care of things at home, or get along with other people? Not difficult at all        PHQ-Score Total:  PHQ-9 Total Score: 9    GEOVANNA-7  Feeling nervous, anxious or on edge: Several days  Not being able to stop or control worrying: More than half the days  Worrying too much about different things: Several days  Trouble Relaxing: Several days  Being so restless that it is hard to sit still: More than half the days  Feeling afraid as if something awful might happen: Not at all  Becoming easily annoyed or irritable: Nearly every day  GEOVANNA 7 Total Score: 10  If you checked any problems, how difficult have these problems made it for you to do your work, take care of things at home, or get along with other people: Not difficult at all       PROMIS scale screening tool that patient filled out virtually reviewed by this APRN at today's encounter.     Assessment/Plan   Diagnoses and all orders for this visit:    1. Generalized anxiety disorder (Primary)  -     FLUoxetine (PROzac) 40 MG capsule; Take 1 capsule by mouth 2 (Two) Times a Day.  Dispense: 60 capsule; Refill: 0  -     busPIRone (BUSPAR) 5 MG tablet; Take 1 tablet by mouth 3 (Three) Times a Day.  Dispense: 90 tablet; Refill: 0    2. Mild episode of recurrent major depressive disorder (CMS/HCC)  -     FLUoxetine (PROzac) 40 MG capsule; Take 1 capsule by mouth 2 (Two) Times a Day.  Dispense: 60 capsule; Refill: 0    3. Sleeping difficulties            Visit Diagnoses:    ICD-10-CM ICD-9-CM   1. Generalized anxiety disorder  F41.1 300.02   2. Mild episode of recurrent major depressive disorder (CMS/HCC)  F33.0 296.31   3. Sleeping difficulties  G47.9 780.50       GOALS:  Short Term Goals: Patient will be compliant with medication, and patient will have no significant medication related side effects.  Patient will be engaged in psychotherapy as indicated.  Patient will report subjective improvement of symptoms.  Long term goals: To stabilize mood and treat/improve  subjective symptoms, the patient will stay out of the hospital, the patient will be at an optimal level of functioning, and the patient will take all medications as prescribed.  The patient verbalized understanding and agreement with goals that were mutually set.      TREATMENT PLAN/GOALS: Continue medications and treatment plan as indicated. Treatment and medication options discussed during today's visit. Patient acknowledged and verbally consented to continue with current treatment plan and was educated on the importance of compliance with treatment and follow-up appointments.    -Begin Buspar 5 mg TID   -Continue Prozac 80 mg daily  -Continue Seroquel 50 mg nightly for sleep  -Continue Propanolol 10-20 mg three times daily as needed for anxiety      MEDICATION ISSUES: Discussed medication options and treatment plan of prescribed medication, any off label use of medication, as well as the risks, benefits, any black box warnings including increased suicidality, and side effects including but not limited to potential falls, dizziness, possible impaired driving, GI side effects (change in appetite, abdominal discomfort, nausea, vomiting, diarrhea, and/or constipation), dry mouth, somnolence, sedation, insomnia, activation, agitation, irritation, tremors, abnormal muscle movements or disorders, tardive dyskinesia, akathisia, asthenia, headache, sweating, possible bruising or rare bleeding, electrolyte and/or fluid abnormalities, change in blood pressure/heart rate/and or heart rhythm, hypotension, sexual dysfunction, rare impulse control problems, rare seizures, rare neuroleptic malignant syndrome, increased risk of death and cerebrovascular events, change in blood glucose and increased risk for diabetes, change in triglycerides and cholesterol and increased risk for dyslipidemia,  weight gain, weight gain that can become problematic to health, skin conditions and reactions, and metabolic adversities among others.  Patient and/or guardian are agreeable to call the office with any worsening of symptoms or onset of side effects, or if any concerns or questions arise.  The contact information for the office is made available to the patient and/or guardian. Patient and/or guardian are agreeable to call 911 or go to the nearest ER should they begin having any SI/HI, or if any urgent concerns arise. No medication side effects or related complaints today.    This APRN has discussed with the patient/guardian about the possibility of serotonin syndrome when certain medications are taken together, as is the case with this patient.  This APRN has provided the patient/guardian with a list of symptoms of serotonin syndrome including symptoms of autonomic instability, altered sensorium, confusion, restlessness, agitation, myoclonus, hyperreflexia, hyperthermia, diaphoresis, tremor, chills, diarrhea and cramps, ataxia, headache, migraines, seizures, and insomnia; which could lead to permanent hyperthermic brain damage, cardiovascular collapse, coma, or even death.  The patient/guardian are instructed to stop medications immediately and either contact this APRN/this office during regular office hours, or go to the emergency department/call 911, if they begin to experience any of the symptoms discussed.  The benefits and risks of the current medication regimen are discussed with the patient/guardian, and they feel that the benefits out weigh the risks.  The patient/guardian verbalized understanding and agreement in their own words.      MEDS ORDERED DURING VISIT:  New Medications Ordered This Visit   Medications   • FLUoxetine (PROzac) 40 MG capsule     Sig: Take 1 capsule by mouth 2 (Two) Times a Day.     Dispense:  60 capsule     Refill:  0   • busPIRone (BUSPAR) 5 MG tablet     Sig: Take 1 tablet by mouth 3 (Three) Times a Day.     Dispense:  90 tablet     Refill:  0       Return in about 4 weeks (around 3/29/2021), or if symptoms worsen or  fail to improve, for Recheck.        Patient will follow-up in 4 weeks, highly encouraged the patient if she had any questions or concerns to contact the behavioral health [facility] clinic for sooner appointment patient verbalized understanding.      Functional Status: Moderate impairment     Prognosis: Guarded with Ongoing Treatment            This document has been electronically signed by BHARAT Partida  March 1, 2021 14:38 EST    Part of this note may be an electronic transcription/translation of spoken language to printed text using the Dragon Dictation System.

## 2021-03-02 ENCOUNTER — TELEMEDICINE (OUTPATIENT)
Dept: PSYCHIATRY | Facility: CLINIC | Age: 56
End: 2021-03-02

## 2021-03-02 PROBLEM — F43.10 POST TRAUMATIC STRESS DISORDER (PTSD): Status: ACTIVE | Noted: 2021-03-02
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
## 2021-03-11 ENCOUNTER — TELEMEDICINE (OUTPATIENT)
Dept: PSYCHIATRY | Facility: CLINIC | Age: 56
End: 2021-03-11

## 2021-03-11 DIAGNOSIS — F43.10 POST TRAUMATIC STRESS DISORDER (PTSD): Primary | ICD-10-CM

## 2021-03-11 PROCEDURE — 90832 PSYTX W PT 30 MINUTES: CPT | Performed by: COUNSELOR
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
## 2021-03-16 ENCOUNTER — LAB (OUTPATIENT)
Dept: LAB | Facility: HOSPITAL | Age: 56
End: 2021-03-16

## 2021-03-16 ENCOUNTER — OFFICE VISIT (OUTPATIENT)
Dept: INTERNAL MEDICINE | Facility: CLINIC | Age: 56
End: 2021-03-16

## 2021-03-16 VITALS
TEMPERATURE: 96.8 F | HEART RATE: 76 BPM | DIASTOLIC BLOOD PRESSURE: 80 MMHG | OXYGEN SATURATION: 98 % | WEIGHT: 145 LBS | BODY MASS INDEX: 28.47 KG/M2 | SYSTOLIC BLOOD PRESSURE: 122 MMHG | RESPIRATION RATE: 16 BRPM | HEIGHT: 60 IN

## 2021-03-16 DIAGNOSIS — K21.9 GASTROESOPHAGEAL REFLUX DISEASE WITHOUT ESOPHAGITIS: Primary | ICD-10-CM

## 2021-03-16 DIAGNOSIS — F41.8 DEPRESSION WITH ANXIETY: ICD-10-CM

## 2021-03-16 DIAGNOSIS — I10 ESSENTIAL HYPERTENSION: ICD-10-CM

## 2021-03-16 PROCEDURE — 86677 HELICOBACTER PYLORI ANTIBODY: CPT | Performed by: NURSE PRACTITIONER

## 2021-03-16 PROCEDURE — 99214 OFFICE O/P EST MOD 30 MIN: CPT | Performed by: NURSE PRACTITIONER

## 2021-03-16 RX ORDER — METOCLOPRAMIDE 10 MG/1
10 TABLET ORAL
Qty: 60 TABLET | Refills: 0 | Status: SHIPPED | OUTPATIENT
Start: 2021-03-16

## 2021-03-19 ENCOUNTER — TELEPHONE (OUTPATIENT)
Dept: INTERNAL MEDICINE | Facility: CLINIC | Age: 56
End: 2021-03-19

## 2021-03-19 LAB
H PYLORI IGA SER-ACNC: <9 UNITS (ref 0–8.9)
H PYLORI IGG SER IA-ACNC: 0.11 INDEX VALUE (ref 0–0.79)
H PYLORI IGM SER-ACNC: <9 UNITS (ref 0–8.9)

## 2021-03-19 NOTE — TELEPHONE ENCOUNTER
I'm filled out the form but she wants a letter to go with this. I'll finish it up Monday and she can  Monday afternoon. thanks

## 2021-03-19 NOTE — TELEPHONE ENCOUNTER
PT CALLED AND WOULD LIKE TO KNOW WHEN SHE CAN  FORM FOR SERVICE DOG.    PLEASE ADVISE.  CALL BACK:[phone]

## 2021-03-26 ENCOUNTER — TELEMEDICINE (OUTPATIENT)
Dept: PSYCHIATRY | Facility: CLINIC | Age: 56
End: 2021-03-26

## 2021-03-26 DIAGNOSIS — F43.10 POST TRAUMATIC STRESS DISORDER (PTSD): Primary | ICD-10-CM

## 2021-03-26 PROCEDURE — 90834 PSYTX W PT 45 MINUTES: CPT | Performed by: COUNSELOR
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
## 2021-03-29 ENCOUNTER — TELEMEDICINE (OUTPATIENT)
Dept: PSYCHIATRY | Facility: CLINIC | Age: 56
End: 2021-03-29

## 2021-03-29 DIAGNOSIS — F33.0 MILD EPISODE OF RECURRENT MAJOR DEPRESSIVE DISORDER (HCC): Primary | Chronic | ICD-10-CM

## 2021-03-29 DIAGNOSIS — G47.9 SLEEPING DIFFICULTIES: ICD-10-CM

## 2021-03-29 DIAGNOSIS — F41.0 PANIC DISORDER (EPISODIC PAROXYSMAL ANXIETY): ICD-10-CM

## 2021-03-29 DIAGNOSIS — F41.1 GENERALIZED ANXIETY DISORDER: Chronic | ICD-10-CM

## 2021-03-29 PROCEDURE — 99214 OFFICE O/P EST MOD 30 MIN: CPT | Performed by: NURSE PRACTITIONER

## 2021-03-29 RX ORDER — FLUOXETINE HYDROCHLORIDE 40 MG/1
40 CAPSULE ORAL DAILY
Qty: 60 CAPSULE | Refills: 0 | Status: SHIPPED | OUTPATIENT
Start: 2021-03-29 | End: 2021-05-24 | Stop reason: SDUPTHER

## 2021-03-29 RX ORDER — BUSPIRONE HYDROCHLORIDE 7.5 MG/1
7.5 TABLET ORAL 3 TIMES DAILY
Qty: 90 TABLET | Refills: 0 | Status: SHIPPED | OUTPATIENT
Start: 2021-03-29 | End: 2021-05-24 | Stop reason: SDUPTHER

## 2021-03-29 NOTE — PROGRESS NOTES
"This provider is located at the [facility] (through [facility]), [address], [place], [address] using a secure [email or web address] Video Visit through [employer]. Patient is being seen remotely via telehealth at their home address in [place], and stated they are in a secure environment for this session. The patient's condition being diagnosed/treated is appropriate for telemedicine. The provider identified herself as well as her credentials. The patient, and/or patients guardian, consent to be seen remotely, and when consent is given they understand that the consent allows for patient identifiable information to be sent to a third party as needed. They may refuse to be seen remotely at any time. The electronic data is encrypted and password protected, and the patient and/or guardian has been advised of the potential risks to privacy not withstanding such measures.    You have chosen to receive care through a telehealth visit.  Do you consent to use a video/audio connection for your medical care today? Yes     Subjective   Mallika Brewster is a 56 y.o. female who is here today for medication management follow up.    Chief Complaint: Depression, anxiety, sleeping difficulties    History of Present Illness: The patient describes her mood as \"good\" over the last few weeks. Patient states that she has not been as anxious since her last visit. Patient states that she thinks the Buspar is helping for her anxiety, but states that she thinks the medication needs increased as she is still having some anxiety. Patient states that overall her mood and anxiety have been improved. Patient states that she is getting a therapy dog soon and this has made her nervous, but states that she is excited as well.  Patient states that she has been confused about how to take her Prozac so she has only been taking one 40 mg capsule. Instructed patient that she should be taking two of the 40 mg capsules in the " "morning for a total of 80 mg and patient verbalizes understanding. Patient endorses that she thought this is how she was supposed to take it but she was fearful that she would \"mess it up\", so she just continued taking one per day until we could discuss it at our next visit.  The patient reports her appetite as good.  The patient reports her sleep as good. Patient states that she sleeps very well with the Seroquel. Patient states that she does continue to have nightmares, but states that they have decreased. Patient states that she has them approximately 2 nights per week now.  The patient denies any new medical problems or changes in medications since last appointment with this facility. The patient denies any current side effects from her current medication regimen.  The patient denies any abnormal muscle movements or tics.  The patient rates her depression at a 3/10 on a 0-10 scale, with 10 being the worst.  The patient rates her anxiety at a 5-6/10 on a 0-10 scale, with 10 being the worst.  The patient rates hopelessness at a 0/10 on a 0-10 scale with 10 being the worst. The patient denies suicidal ideations and homicidal ideations, and is convincing.  The patient denies any auditory hallucinations or visual hallucinations.  The patient does not endorse significant symptoms consistent with bipolar disorder or a psychotic illness.            The patient denies any chance of pregnancy at this time.  The patient was educated that her prescribed medications can have potential risk to a developing fetus. The patient is advised to contact this APRN/this office if she becomes pregnant or plans to become pregnant.  Pt verbalizes understanding and acknowledged agreement with this plan in her own words.          The following portions of the patient's history were reviewed and updated as appropriate: allergies, current medications, past family history, past medical history, past social history, past surgical history and " problem list.    Review of Systems   Constitutional: Negative for activity change, appetite change, fatigue and unexpected weight change.   Psychiatric/Behavioral: Positive for decreased concentration and dysphoric mood. Negative for agitation, behavioral problems, confusion, hallucinations, self-injury, sleep disturbance and suicidal ideas. The patient is nervous/anxious. The patient is not hyperactive.        Objective   Physical Exam  Constitutional:       Appearance: Normal appearance.   Neurological:      Mental Status: She is alert.   Psychiatric:         Attention and Perception: Attention and perception normal.         Mood and Affect: Affect normal. Mood is anxious.         Speech: Speech normal.         Behavior: Behavior normal. Behavior is cooperative.         Thought Content: Thought content normal. Thought content does not include homicidal or suicidal ideation. Thought content does not include homicidal or suicidal plan.         Cognition and Memory: Cognition and memory normal.         Judgment: Judgment normal.       not currently breastfeeding.    The patient was seen remotely today via a MyChart Video Visit through [facility].  Unable to obtain vital signs due to nature of remote visit.  Height stated at 60 inches.  Weight stated at 140 pounds.     No Known Allergies    Recent Results (from the past 2016 hour(s))   CBC (No Diff)    Collection Time: 01/12/21 11:16 AM    Specimen: Blood   Result Value Ref Range    WBC 6.71 3.40 - 10.80 10*3/mm3    RBC 4.73 3.77 - 5.28 10*6/mm3    Hemoglobin 14.2 12.0 - 15.9 g/dL    Hematocrit 41.7 34.0 - 46.6 %    MCV 88.2 79.0 - 97.0 fL    MCH 30.0 26.6 - 33.0 pg    MCHC 34.1 31.5 - 35.7 g/dL    RDW 12.0 (L) 12.3 - 15.4 %    RDW-SD 38.7 37.0 - 54.0 fl    MPV 10.1 6.0 - 12.0 fL    Platelets 365 140 - 450 10*3/mm3   Comprehensive Metabolic Panel    Collection Time: 01/12/21 11:16 AM    Specimen: Blood   Result Value Ref Range    Glucose 107 (H) 65 - 99 mg/dL    BUN 12 6 -  20 mg/dL    Creatinine 0.72 0.57 - 1.00 mg/dL    Sodium 139 136 - 145 mmol/L    Potassium 4.2 3.5 - 5.2 mmol/L    Chloride 103 98 - 107 mmol/L    CO2 25.8 22.0 - 29.0 mmol/L    Calcium 9.7 8.6 - 10.5 mg/dL    Total Protein 7.4 6.0 - 8.5 g/dL    Albumin 4.60 3.50 - 5.20 g/dL    ALT (SGPT) 40 (H) 1 - 33 U/L    AST (SGOT) 27 1 - 32 U/L    Alkaline Phosphatase 62 39 - 117 U/L    Total Bilirubin 1.1 0.0 - 1.2 mg/dL    eGFR Non African Amer 85 >60 mL/min/1.73    eGFR  African Amer 103 >60 mL/min/1.73    Globulin 2.8 gm/dL    A/G Ratio 1.6 g/dL    BUN/Creatinine Ratio 16.7 7.0 - 25.0    Anion Gap 10.2 5.0 - 15.0 mmol/L   Lipid Panel    Collection Time: 01/12/21 11:16 AM    Specimen: Blood   Result Value Ref Range    Total Cholesterol 209 (H) 0 - 200 mg/dL    Triglycerides 188 (H) 0 - 150 mg/dL    HDL Cholesterol 55 40 - 60 mg/dL    LDL Cholesterol  121 (H) 0 - 100 mg/dL    VLDL Cholesterol 33 5 - 40 mg/dL    LDL/HDL Ratio 2.12    Hemoglobin A1c    Collection Time: 01/12/21 11:16 AM    Specimen: Blood   Result Value Ref Range    Hemoglobin A1C 6.60 (H) 4.80 - 5.60 %   Vitamin D 25 Hydroxy    Collection Time: 01/12/21 11:16 AM    Specimen: Blood   Result Value Ref Range    25 Hydroxy, Vitamin D 46.0 30.0 - 100.0 ng/ml   Helicobacter Pylori, IgA IgG IgM    Collection Time: 03/16/21  3:40 PM    Specimen: Blood   Result Value Ref Range    H. pylori IgG 0.11 0.00 - 0.79 Index Value    H. pylori, IgA ABS <9.0 0.0 - 8.9 units    H. Pylori, IgM <9.0 0.0 - 8.9 units       Current Medications:   Current Outpatient Medications   Medication Sig Dispense Refill   • busPIRone (BUSPAR) 7.5 MG tablet Take 1 tablet by mouth 3 (Three) Times a Day. 90 tablet 0   • esomeprazole (nexIUM) 40 MG capsule Take 1 capsule by mouth 2 (Two) Times a Day. 180 capsule 3   • famotidine (PEPCID) 40 MG tablet TAKE 1 TABLET TWICE A DAY AS NEEDED FOR HEARTBURN 60 tablet 11   • FLUoxetine (PROzac) 40 MG capsule Take 1 capsule by mouth Daily. 60 capsule 0   •  glucose blood (Glucose Meter Test) test strip Use as instructed to check blood glucose levels 3 times daily 100 each 5   • glucose monitor monitoring kit 1 each As Needed (Check blood sugars 3 times daily PRN.). 1 each 0   • JANUVIA 50 MG tablet TAKE 1 TABLET DAILY 90 tablet 3   • Lancets misc Use as instructed to test blood glucose levels 3 times daily. 100 each 5   • linaclotide (LINZESS) 145 MCG capsule capsule Take 145 mcg by mouth Daily.     • losartan (COZAAR) 25 MG tablet Take 1 tablet by mouth Daily.     • meloxicam (MOBIC) 15 MG tablet      • metFORMIN (GLUCOPHAGE) 1000 MG tablet      • methocarbamol (ROBAXIN) 750 MG tablet      • metoclopramide (Reglan) 10 MG tablet Take 1 tablet by mouth 4 (Four) Times a Day Before Meals & at Bedtime As Needed (heartburn). 60 tablet 0   • montelukast (SINGULAIR) 10 MG tablet Take 1 tablet by mouth Daily.     • nabumetone (RELAFEN) 500 MG tablet      • propranolol (INDERAL) 20 MG tablet Take 1/2 to 1 tablet by mouth 3 times a day as needed for anxiety. 60 tablet 2   • QUEtiapine (SEROquel) 25 MG tablet TAKE 1 TO 2 TABLETS 30 MINUTES BEFORE BEDTIME AS NEEDED FOR SLEEP 180 tablet 3   • simvastatin (ZOCOR) 20 MG tablet Take 1 tablet by mouth Daily.     • sucralfate (CARAFATE) 1 g tablet Take 4 tablets by mouth Daily.     • vitamin D (ERGOCALCIFEROL) 1.25 MG (06651 UT) capsule capsule Take 1 capsule by mouth 1 (One) Time Per Week. 12 capsule 2     No current facility-administered medications for this visit.       Mental Status Exam:   Hygiene:   good  Cooperation:  Cooperative  Eye Contact:  Good  Psychomotor Behavior:  Appropriate  Affect:  Full range  Hopelessness: Denies  Speech:  Normal  Thought Process:  Goal directed  Thought Content:  Normal  Suicidal:  None  Homicidal:  None  Hallucinations:  None  Delusion:  None  Memory:  Intact  Orientation:  Person, Place, Time and Situation  Reliability:  good  Insight:  Good  Judgement:  Good  Impulse Control:   Good  Physical/Medical Issues:  Yes See medical history    PHQ-9 Depression Screening  Little interest or pleasure in doing things? (P) 0   Feeling down, depressed, or hopeless? (P) 2   Trouble falling or staying asleep, or sleeping too much? (P) 0   Feeling tired or having little energy? (P) 0   Poor appetite or overeating? (P) 0   Feeling bad about yourself - or that you are a failure or have let yourself or your family down? (P) 0   Trouble concentrating on things, such as reading the newspaper or watching television? (P) 1   Moving or speaking so slowly that other people could have noticed? Or the opposite - being so fidgety or restless that you have been moving around a lot more than usual? (P) 0   Thoughts that you would be better off dead, or of hurting yourself in some way? (P) 0   PHQ-9 Total Score (P) 3   If you checked off any problems, how difficult have these problems made it for you to do your work, take care of things at home, or get along with other people? (P) Not difficult at all        PHQ-Score Total:  PHQ-9 Total Score: (P) 3    GEOVANNA-7  Feeling nervous, anxious or on edge: Several days  Not being able to stop or control worrying: Several days  Worrying too much about different things: Several days  Trouble Relaxing: More than half the days  Being so restless that it is hard to sit still: More than half the days  Feeling afraid as if something awful might happen: More than half the days  Becoming easily annoyed or irritable: Several days  GEOVANNA 7 Total Score: 10  If you checked any problems, how difficult have these problems made it for you to do your work, take care of things at home, or get along with other people: Not difficult at all       PROMIS scale screening tool that patient filled out virtually reviewed by this APRN at today's encounter.     Past available notes from PCP reviewed by this APRN at today's encounter.     Assessment/Plan   Diagnoses and all orders for this visit:    1. Mild  episode of recurrent major depressive disorder (CMS/HCC) (Primary)  -     FLUoxetine (PROzac) 40 MG capsule; Take 1 capsule by mouth Daily.  Dispense: 60 capsule; Refill: 0    2. Generalized anxiety disorder  -     FLUoxetine (PROzac) 40 MG capsule; Take 1 capsule by mouth Daily.  Dispense: 60 capsule; Refill: 0  -     busPIRone (BUSPAR) 7.5 MG tablet; Take 1 tablet by mouth 3 (Three) Times a Day.  Dispense: 90 tablet; Refill: 0    3. Panic disorder (episodic paroxysmal anxiety)  -     FLUoxetine (PROzac) 40 MG capsule; Take 1 capsule by mouth Daily.  Dispense: 60 capsule; Refill: 0  -     busPIRone (BUSPAR) 7.5 MG tablet; Take 1 tablet by mouth 3 (Three) Times a Day.  Dispense: 90 tablet; Refill: 0    4. Sleeping difficulties            Visit Diagnoses:    ICD-10-CM ICD-9-CM   1. Mild episode of recurrent major depressive disorder (CMS/HCC)  F33.0 296.31   2. Generalized anxiety disorder  F41.1 300.02   3. Panic disorder (episodic paroxysmal anxiety)  F41.0 300.01   4. Sleeping difficulties  G47.9 780.50       GOALS:  Short Term Goals: Patient will be compliant with medication, and patient will have no significant medication related side effects.  Patient will be engaged in psychotherapy as indicated.  Patient will report subjective improvement of symptoms.  Long term goals: To stabilize mood and treat/improve subjective symptoms, the patient will stay out of the hospital, the patient will be at an optimal level of functioning, and the patient will take all medications as prescribed.  The patient verbalized understanding and agreement with goals that were mutually set.      TREATMENT PLAN/GOALS: Continue medications and treatment plan as indicated. Treatment and medication options discussed during today's visit. Patient acknowledged and verbally consented to continue with current treatment plan and was educated on the importance of compliance with treatment and follow-up appointments.    -Increase Buspar to 7.5 mg  TID   -Increase Prozac to 80 mg daily. Dose was increased to 80 mg at last visit, but patient states that she has only been taking 40 mg daily.  -Continue Seroquel 50 mg nightly for sleep  -Continue Propanolol 10-20 mg three times daily as needed for anxiety  -Continue psychotherapy       MEDICATION ISSUES: Discussed medication options and treatment plan of prescribed medication, any off label use of medication, as well as the risks, benefits, any black box warnings including increased suicidality, and side effects including but not limited to potential falls, dizziness, possible impaired driving, GI side effects (change in appetite, abdominal discomfort, nausea, vomiting, diarrhea, and/or constipation), dry mouth, somnolence, sedation, insomnia, activation, agitation, irritation, tremors, abnormal muscle movements or disorders, tardive dyskinesia, akathisia, asthenia, headache, sweating, possible bruising or rare bleeding, electrolyte and/or fluid abnormalities, change in blood pressure/heart rate/and or heart rhythm, hypotension, sexual dysfunction, rare impulse control problems, rare seizures, rare neuroleptic malignant syndrome, increased risk of death and cerebrovascular events, change in blood glucose and increased risk for diabetes, change in triglycerides and cholesterol and increased risk for dyslipidemia,  weight gain, weight gain that can become problematic to health, skin conditions and reactions, and metabolic adversities among others. Patient and/or guardian are agreeable to call the office with any worsening of symptoms or onset of side effects, or if any concerns or questions arise.  The contact information for the office is made available to the patient and/or guardian. Patient and/or guardian are agreeable to call 911 or go to the nearest ER should they begin having any SI/HI, or if any urgent concerns arise. No medication side effects or related complaints today.    This APRN has discussed with  the patient/guardian about the possibility of serotonin syndrome when certain medications are taken together, as is the case with this patient.  This APRN has provided the patient/guardian with a list of symptoms of serotonin syndrome including symptoms of autonomic instability, altered sensorium, confusion, restlessness, agitation, myoclonus, hyperreflexia, hyperthermia, diaphoresis, tremor, chills, diarrhea and cramps, ataxia, headache, migraines, seizures, and insomnia; which could lead to permanent hyperthermic brain damage, cardiovascular collapse, coma, or even death.  The patient/guardian are instructed to stop medications immediately and either contact this APRN/this office during regular office hours, or go to the emergency department/call 911, if they begin to experience any of the symptoms discussed.  The benefits and risks of the current medication regimen are discussed with the patient/guardian, and they feel that the benefits out weigh the risks.  The patient/guardian verbalized understanding and agreement in their own words.      MEDS ORDERED DURING VISIT:  New Medications Ordered This Visit   Medications   • FLUoxetine (PROzac) 40 MG capsule     Sig: Take 1 capsule by mouth Daily.     Dispense:  60 capsule     Refill:  0   • busPIRone (BUSPAR) 7.5 MG tablet     Sig: Take 1 tablet by mouth 3 (Three) Times a Day.     Dispense:  90 tablet     Refill:  0       Return in about 4 weeks (around 4/26/2021), or if symptoms worsen or fail to improve, for Recheck.        Patient will follow-up in 4 weeks, highly encouraged the patient if she had any questions or concerns to contact the behavioral health virtual clinic for sooner appointment patient verbalized understanding.      Functional Status: Moderate impairment     Prognosis: Guarded with Ongoing Treatment            This document has been electronically signed by BHARAT Partida  March 29, 2021 09:15 EDT    Part of this note may be an electronic  transcription/translation of spoken language to printed text using the Dragon Dictation System.

## 2021-03-30 DIAGNOSIS — K58.1 IRRITABLE BOWEL SYNDROME WITH CONSTIPATION: ICD-10-CM

## 2021-03-31 ENCOUNTER — TELEPHONE (OUTPATIENT)
Dept: INTERNAL MEDICINE | Facility: CLINIC | Age: 56
End: 2021-03-31

## 2021-03-31 ENCOUNTER — PRIOR AUTHORIZATION (OUTPATIENT)
Dept: INTERNAL MEDICINE | Facility: CLINIC | Age: 56
End: 2021-03-31

## 2021-03-31 NOTE — TELEPHONE ENCOUNTER
Attempted to complete PA however received outcome that pt doesn't have active coverage under this group.Called [facility] and had to complete verbally over the phone. PA for linzess was approved from 03/1/21-03/31/22  PA Reference number:[id number]  Attempted to contact pt to inform; no answer LVM with office number given.

## 2021-03-31 NOTE — TELEPHONE ENCOUNTER
See PA encounter; Medication needed a PA. Unable to complete via covermymeds. Called and spoke to [facility]. Completed pa verbally. Medication was approved. Attempted to contact pt to inform. No answer LVM with office number given.

## 2021-03-31 NOTE — TELEPHONE ENCOUNTER
PT CALLED STATED THAT SHE NEEDS A PRIOR AUTH. FOR RX  linaclotide (LINZESS) 145 MCG capsule capsule TO SEND TO [facility]:[phone]    PLEASE ADVISE.  CALL BACK:[phone]

## 2021-04-13 DIAGNOSIS — F41.1 GENERALIZED ANXIETY DISORDER: Chronic | ICD-10-CM

## 2021-04-13 DIAGNOSIS — F41.0 PANIC DISORDER (EPISODIC PAROXYSMAL ANXIETY): ICD-10-CM

## 2021-04-13 RX ORDER — BUSPIRONE HYDROCHLORIDE 7.5 MG/1
TABLET ORAL
Qty: 90 TABLET | Refills: 11 | OUTPATIENT
Start: 2021-04-13

## 2021-04-19 ENCOUNTER — TELEMEDICINE (OUTPATIENT)
Dept: PSYCHIATRY | Facility: CLINIC | Age: 56
End: 2021-04-19

## 2021-04-19 DIAGNOSIS — F43.10 POST TRAUMATIC STRESS DISORDER (PTSD): Primary | ICD-10-CM

## 2021-04-19 PROCEDURE — 90832 PSYTX W PT 30 MINUTES: CPT | Performed by: COUNSELOR

## 2021-04-19 NOTE — PROGRESS NOTES
Date: April 19, 2021  Time In: 8:30 am   Time Out: 9:00  am  This provider is located at the [facility] (through [facility]), [address], [place] [address] using a secure DocSendhart Video Visit through [employer]. Patient is being seen remotely via telehealth at home address in [place] and stated they are in a secure environment for this session. The patient's condition being diagnosed/treated is appropriate for telemedicine. The provider identified herself as well as her credentials. The patient, and/or patients guardian, consent to be seen remotely, and when consent is given they understand that the consent allows for patient identifiable information to be sent to a third party as needed. They may refuse to be seen remotely at any time. The electronic data is encrypted and password protected, and the patient and/or guardian has been advised of the potential risks to privacy not withstanding such measures.     You have chosen to receive care through a telehealth visit.  Do you consent to use a video/audio connection for your medical care today? Yes    PROGRESS NOTE  Data:  Mallika Brewster is a 56 y.o. female who presents today for follow up. Patient states that she is still excited about getting her emotional support animal in June. Patient is still worried about contact between her  and other women on social media. Patient's daughter is trying to help her block her  from all social media. Patient continues to have disturbing dreams about being a  in a restaurant and trying to please everyone. 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 Discussed meal planning and scheduling and concerns of her  and son's brain tumors coming back. Patient reported being sad about her daughter moving so far away to [place] but is trying to look at the positive by thinking about the   culture that she could experience if she goes to visit her daughter out in [place].     Chief Complaint: Feelings of anxiety and depression.    History of Present Illness: patient has dealt with feelings of anxiety for years and has experienced symptoms of PTSD since she was a child.      Clinical Maneuvering/Intervention: CBT to address dreams and feelings of anxiety.    (Scales based on 0 - 10 with 10 being the worst)  Depression: 3 Anxiety: 5       Assisted patient in processing above session content; acknowledged and normalized patient’s thoughts, feelings, and concerns in relation to her feelings of anxiety and working around the dream of being  stuck in a restaurant.  Rationalized patient thought process regarding time management for all of her duties and concerns. Discussed triggers associated with patient's feelings of anxiety.  Also discussed coping skills for patient to implement such as meal planning, time scheduling, and deep breathing/grounding techniques.    Allowed patient to freely discuss issues without interruption or judgment. Provided safe, confidential environment to facilitate the development of positive therapeutic relationship and encourage open, honest communication. Assisted patient in identifying risk factors which would indicate the need for higher level of care including thoughts to harm self or others and/or self-harming behavior and encouraged patient to contact this office, call 911, or present to the nearest emergency room should any of these events occur. Discussed crisis intervention services and means to access. Patient adamantly and convincingly denies current suicidal or homicidal ideation or perceptual disturbance.    Assessment:   Assessment   Patient appears to maintain relative stability as compared to their baseline.  However, patient continues to struggle with nightmares of working in the restaurant and feelings of anxiety which continue to cause impairment in  important areas of functioning.  A result, they can be reasonably expected to continue to benefit from treatment and would likely be at increased risk for decompensation otherwise.    Mental Status Exam:  The majority of the session was completed via phone due to audio connection issues.  Hygiene:   good  Cooperation:  Cooperative  Eye Contact:  Good  Psychomotor Behavior:  Appropriate  Affect:  Appropriate  Mood: fluctates  Speech:  Normal  Thought Process:  Goal directed  Thought Content:  Normal  Suicidal:  None  Homicidal:  None  Hallucinations:  None  Delusion:  None  Memory:  Intact  Orientation:  Person, Place, Time and Situation  Reliability:  good  Insight:  Good  Judgement:  Good  Impulse Control:  Good  Physical/Medical Issues:  No        Patient's Support Network Includes:  children    Functional Status: Moderate impairment     Progress toward goal: Not at goal    Prognosis: Fair with Ongoing Treatment          Plan:    Patient will continue in individual outpatient therapy with focus on improved functioning and coping skills, maintaining stability, and avoiding decompensation and the need for higher level of care.    Patient will adhere to medication regimen as prescribed and report any side effects. Patient will contact this office, call 911 or present to the nearest emergency room should suicidal or homicidal ideations occur. Provide Cognitive Behavioral Therapy and Solution Focused Therapy to improve functioning, maintain stability, and avoid decompensation and the need for higher level of care.     Return in about 4 weeks, or earlier if symptoms worsen or fail to improve.           VISIT DIAGNOSIS:     ICD-10-CM ICD-9-CM   1. Post traumatic stress disorder (PTSD)  F43.10 309.81             This document has been electronically signed by NILSON Unger  April 19, 2021 09:01 EDT      Part of this note may be an electronic transcription/translation of spoken language to printed text using the  [facility] Dictation System.

## 2021-04-22 DIAGNOSIS — J30.89 ENVIRONMENTAL AND SEASONAL ALLERGIES: ICD-10-CM

## 2021-04-22 RX ORDER — MONTELUKAST SODIUM 10 MG/1
10 TABLET ORAL DAILY
Qty: 90 TABLET | Refills: 3 | Status: SHIPPED | OUTPATIENT
Start: 2021-04-22 | End: 2022-04-18

## 2021-04-27 ENCOUNTER — OFFICE VISIT (OUTPATIENT)
Dept: INTERNAL MEDICINE | Facility: CLINIC | Age: 56
End: 2021-04-27

## 2021-04-27 ENCOUNTER — LAB (OUTPATIENT)
Dept: LAB | Facility: HOSPITAL | Age: 56
End: 2021-04-27

## 2021-04-27 ENCOUNTER — OFFICE VISIT (OUTPATIENT)
Dept: GASTROENTEROLOGY | Facility: CLINIC | Age: 56
End: 2021-04-27

## 2021-04-27 VITALS
SYSTOLIC BLOOD PRESSURE: 116 MMHG | OXYGEN SATURATION: 98 % | DIASTOLIC BLOOD PRESSURE: 76 MMHG | HEART RATE: 84 BPM | BODY MASS INDEX: 28.66 KG/M2 | WEIGHT: 146 LBS | TEMPERATURE: 96.8 F | RESPIRATION RATE: 16 BRPM | HEIGHT: 60 IN

## 2021-04-27 VITALS
TEMPERATURE: 98.1 F | SYSTOLIC BLOOD PRESSURE: 119 MMHG | BODY MASS INDEX: 28.39 KG/M2 | DIASTOLIC BLOOD PRESSURE: 88 MMHG | WEIGHT: 144.6 LBS | HEIGHT: 60 IN | HEART RATE: 91 BPM

## 2021-04-27 DIAGNOSIS — M54.6 CHRONIC BILATERAL THORACIC BACK PAIN: ICD-10-CM

## 2021-04-27 DIAGNOSIS — N62 LARGE BREASTS: ICD-10-CM

## 2021-04-27 DIAGNOSIS — K21.9 GASTROESOPHAGEAL REFLUX DISEASE WITHOUT ESOPHAGITIS: Primary | ICD-10-CM

## 2021-04-27 DIAGNOSIS — M54.50 LUMBAR PAIN: ICD-10-CM

## 2021-04-27 DIAGNOSIS — R14.2 ERUCTATION: ICD-10-CM

## 2021-04-27 DIAGNOSIS — M54.2 CERVICAL PAIN: ICD-10-CM

## 2021-04-27 DIAGNOSIS — Z79.4 TYPE 2 DIABETES MELLITUS WITHOUT COMPLICATION, WITH LONG-TERM CURRENT USE OF INSULIN (HCC): Primary | ICD-10-CM

## 2021-04-27 DIAGNOSIS — G89.29 CHRONIC BILATERAL THORACIC BACK PAIN: ICD-10-CM

## 2021-04-27 DIAGNOSIS — E11.9 TYPE 2 DIABETES MELLITUS WITHOUT COMPLICATION, WITH LONG-TERM CURRENT USE OF INSULIN (HCC): Primary | ICD-10-CM

## 2021-04-27 LAB — HBA1C MFR BLD: 6.26 % (ref 4.8–5.6)

## 2021-04-27 PROCEDURE — 80053 COMPREHEN METABOLIC PANEL: CPT | Performed by: NURSE PRACTITIONER

## 2021-04-27 PROCEDURE — 99214 OFFICE O/P EST MOD 30 MIN: CPT | Performed by: NURSE PRACTITIONER

## 2021-04-27 PROCEDURE — 83036 HEMOGLOBIN GLYCOSYLATED A1C: CPT | Performed by: NURSE PRACTITIONER

## 2021-04-27 RX ORDER — RABEPRAZOLE SODIUM 20 MG/1
20 TABLET, DELAYED RELEASE ORAL 2 TIMES DAILY
Qty: 90 TABLET | Refills: 3 | Status: SHIPPED | OUTPATIENT
Start: 2021-04-27 | End: 2021-10-07

## 2021-04-27 NOTE — PROGRESS NOTES
GASTROENTEROLOGY OFFICE NOTE  Mallika Brewster  [id number]  1965    CARE TEAM  Patient Care Team:  Amanda Katz APRN as PCP - General (Nurse Practitioner)    Referring Provider: Amanda Katz APRN    Chief Complaint   Patient presents with   • Bloated   • belching        HISTORY OF PRESENT ILLNESS:  Ms. Brewster is seen in follow-up today with chronic acid reflux and eructation.  She has been treated for many years for acid reflux.  She has had therapeutic failure to omeprazole, Prevacid, pantoprazole, and now most recently Nexium 40 mg twice daily.  She also takes Pepcid 20 mg twice daily.  She reports that her stomach hurts all the time, a burning sensation that also is in her chest on most days.  She has had trouble with frequent belching but it is much worse, she is belching almost nonstop, this is evident in the office today.  She was recently started on Reglan by her primary care provider, she has not noted any difference in her belching.    PAST MEDICAL HISTORY  Past Medical History:   Diagnosis Date   • Acid reflux    • Arthritis    • Back pain    • Bronchitis    • Diabetes mellitus (CMS/HCC)    • High cholesterol    • Left hip pain         PAST SURGICAL HISTORY  Past Surgical History:   Procedure Laterality Date   • CARPAL TUNNEL RELEASE Bilateral    • CHOLECYSTECTOMY     • HYSTERECTOMY     • LIPOMA EXCISION  11/05/2020    neck   • OOPHORECTOMY     • SHOULDER ROTATOR CUFF REPAIR Left         MEDICATIONS:    Current Outpatient Medications:   •  busPIRone (BUSPAR) 7.5 MG tablet, Take 1 tablet by mouth 3 (Three) Times a Day., Disp: 90 tablet, Rfl: 0  •  famotidine (PEPCID) 40 MG tablet, TAKE 1 TABLET TWICE A DAY AS NEEDED FOR HEARTBURN, Disp: 60 tablet, Rfl: 11  •  FLUoxetine (PROzac) 40 MG capsule, Take 1 capsule by mouth Daily., Disp: 60 capsule, Rfl: 0  •  glucose blood (Glucose Meter Test) test strip, Use as instructed to check blood glucose levels 3 times daily, Disp: 100  each, Rfl: 5  •  glucose monitor monitoring kit, 1 each As Needed (Check blood sugars 3 times daily PRN.)., Disp: 1 each, Rfl: 0  •  JANUVIA 50 MG tablet, TAKE 1 TABLET DAILY, Disp: 90 tablet, Rfl: 3  •  Lancets misc, Use as instructed to test blood glucose levels 3 times daily., Disp: 100 each, Rfl: 5  •  linaclotide (LINZESS) 145 MCG capsule capsule, Take 1 capsule by mouth Daily., Disp: 90 capsule, Rfl: 3  •  losartan (COZAAR) 25 MG tablet, Take 1 tablet by mouth Daily., Disp: , Rfl:   •  meloxicam (MOBIC) 15 MG tablet, , Disp: , Rfl:   •  metFORMIN (GLUCOPHAGE) 1000 MG tablet, , Disp: , Rfl:   •  methocarbamol (ROBAXIN) 750 MG tablet, , Disp: , Rfl:   •  metoclopramide (Reglan) 10 MG tablet, Take 1 tablet by mouth 4 (Four) Times a Day Before Meals & at Bedtime As Needed (heartburn)., Disp: 60 tablet, Rfl: 0  •  montelukast (SINGULAIR) 10 MG tablet, Take 1 tablet by mouth Daily., Disp: 90 tablet, Rfl: 3  •  nabumetone (RELAFEN) 500 MG tablet, , Disp: , Rfl:   •  propranolol (INDERAL) 20 MG tablet, Take 1/2 to 1 tablet by mouth 3 times a day as needed for anxiety., Disp: 60 tablet, Rfl: 2  •  QUEtiapine (SEROquel) 25 MG tablet, TAKE 1 TO 2 TABLETS 30 MINUTES BEFORE BEDTIME AS NEEDED FOR SLEEP, Disp: 180 tablet, Rfl: 3  •  RABEprazole (ACIPHEX) 20 MG EC tablet, Take 1 tablet by mouth 2 (Two) Times a Day., Disp: 90 tablet, Rfl: 3  •  simvastatin (ZOCOR) 20 MG tablet, Take 1 tablet by mouth Daily., Disp: , Rfl:   •  sucralfate (CARAFATE) 1 g tablet, Take 4 tablets by mouth Daily., Disp: , Rfl:   •  vitamin D (ERGOCALCIFEROL) 1.25 MG (01822 UT) capsule capsule, Take 1 capsule by mouth 1 (One) Time Per Week., Disp: 12 capsule, Rfl: 2    ALLERGIES  No Known Allergies    FAMILY HISTORY:  Family History   Adopted: Yes   Problem Relation Age of Onset   • Mental illness Other    • Schizophrenia Son        SOCIAL HISTORY  Social History     Socioeconomic History   • Marital status:      Spouse name: Not on file   •  "Number of children: Not on file   • Years of education: Not on file   • Highest education level: Not on file   Tobacco Use   • Smoking status: Former Smoker     Packs/day: 0.50     Start date: 9/22/1987     Quit date: 2/1/2018     Years since quitting: 3.2   • Smokeless tobacco: Never Used   Vaping Use   • Vaping Use: Never used   Substance and Sexual Activity   • Alcohol use: No   • Drug use: No   • Sexual activity: Defer       REVIEW OF SYSTEMS  Review of Systems   Constitutional: Negative for activity change, appetite change, chills, diaphoresis, fatigue, fever, unexpected weight gain and unexpected weight loss.   HENT: Negative for trouble swallowing and voice change.    Gastrointestinal: Positive for abdominal distention, GERD and indigestion. Negative for abdominal pain, anal bleeding, blood in stool, constipation, diarrhea, nausea, rectal pain and vomiting.         PHYSICAL EXAM   /88 (BP Location: Right arm, Patient Position: Sitting, Cuff Size: Adult)   Pulse 91   Temp 98.1 °F (36.7 °C) (Temporal)   Ht 152.4 cm (60\")   Wt 65.6 kg (144 lb 9.6 oz)   BMI 28.24 kg/m²   Physical Exam  Vitals and nursing note reviewed.   Constitutional:       Appearance: Normal appearance. She is well-developed.   HENT:      Head: Normocephalic and atraumatic.      Nose: Nose normal.      Mouth/Throat:      Mouth: Mucous membranes are moist.      Pharynx: Oropharynx is clear.   Eyes:      Pupils: Pupils are equal, round, and reactive to light.   Cardiovascular:      Rate and Rhythm: Normal rate and regular rhythm.   Pulmonary:      Effort: Pulmonary effort is normal.      Breath sounds: Normal breath sounds. No wheezing or rales.   Abdominal:      General: Bowel sounds are normal.      Palpations: Abdomen is soft. There is no mass.      Tenderness: There is no abdominal tenderness. There is no guarding or rebound.      Hernia: No hernia is present.   Musculoskeletal:         General: Normal range of motion.      " Cervical back: Normal range of motion and neck supple.   Skin:     General: Skin is warm and dry.   Neurological:      Mental Status: She is alert and oriented to person, place, and time.      Cranial Nerves: No cranial nerve deficit.   Psychiatric:         Behavior: Behavior normal.         Judgment: Judgment normal.       Results Review:  [facility] records reviewed and discussed with patient.     ASSESSMENT / PLAN  1.  Chronic GERD  -Discontinue Nexium  -Begin AcipHex 20 mg twice daily  2.  Eructation  -FD guard 2 capsules twice daily    Return in about 6 months (around 10/27/2021).    I discussed the patients findings and my recommendations with patient    Anthony Cuevas, APRN

## 2021-04-28 LAB
ALBUMIN SERPL-MCNC: 4.1 G/DL (ref 3.5–5.2)
ALBUMIN/GLOB SERPL: 1.6 G/DL
ALP SERPL-CCNC: 54 U/L (ref 39–117)
ALT SERPL W P-5'-P-CCNC: 24 U/L (ref 1–33)
ANION GAP SERPL CALCULATED.3IONS-SCNC: 12.1 MMOL/L (ref 5–15)
AST SERPL-CCNC: 19 U/L (ref 1–32)
BILIRUB SERPL-MCNC: 0.9 MG/DL (ref 0–1.2)
BUN SERPL-MCNC: 13 MG/DL (ref 6–20)
BUN/CREAT SERPL: 15.1 (ref 7–25)
CALCIUM SPEC-SCNC: 9 MG/DL (ref 8.6–10.5)
CHLORIDE SERPL-SCNC: 99 MMOL/L (ref 98–107)
CO2 SERPL-SCNC: 23.9 MMOL/L (ref 22–29)
CREAT SERPL-MCNC: 0.86 MG/DL (ref 0.57–1)
GFR SERPL CREATININE-BSD FRML MDRD: 69 ML/MIN/1.73
GFR SERPL CREATININE-BSD FRML MDRD: 83 ML/MIN/1.73
GLOBULIN UR ELPH-MCNC: 2.5 GM/DL
GLUCOSE SERPL-MCNC: 124 MG/DL (ref 65–99)
POTASSIUM SERPL-SCNC: 4.1 MMOL/L (ref 3.5–5.2)
PROT SERPL-MCNC: 6.6 G/DL (ref 6–8.5)
SODIUM SERPL-SCNC: 135 MMOL/L (ref 136–145)

## 2021-05-17 ENCOUNTER — TRANSCRIBE ORDERS (OUTPATIENT)
Dept: ADMINISTRATIVE | Facility: HOSPITAL | Age: 56
End: 2021-05-17

## 2021-05-17 ENCOUNTER — TELEPHONE (OUTPATIENT)
Dept: INTERNAL MEDICINE | Facility: CLINIC | Age: 56
End: 2021-05-17

## 2021-05-17 DIAGNOSIS — G89.29 CHRONIC BILATERAL THORACIC BACK PAIN: ICD-10-CM

## 2021-05-17 DIAGNOSIS — M54.50 LUMBAR PAIN: ICD-10-CM

## 2021-05-17 DIAGNOSIS — M54.6 CHRONIC BILATERAL THORACIC BACK PAIN: ICD-10-CM

## 2021-05-17 DIAGNOSIS — M54.2 CERVICAL PAIN: Primary | ICD-10-CM

## 2021-05-17 DIAGNOSIS — Z12.31 VISIT FOR SCREENING MAMMOGRAM: Primary | ICD-10-CM

## 2021-05-24 ENCOUNTER — TELEMEDICINE (OUTPATIENT)
Dept: PSYCHIATRY | Facility: CLINIC | Age: 56
End: 2021-05-24

## 2021-05-24 DIAGNOSIS — F41.1 GENERALIZED ANXIETY DISORDER: Primary | Chronic | ICD-10-CM

## 2021-05-24 DIAGNOSIS — F41.0 PANIC DISORDER (EPISODIC PAROXYSMAL ANXIETY): ICD-10-CM

## 2021-05-24 DIAGNOSIS — F43.10 POST TRAUMATIC STRESS DISORDER (PTSD): ICD-10-CM

## 2021-05-24 DIAGNOSIS — F33.0 MILD EPISODE OF RECURRENT MAJOR DEPRESSIVE DISORDER (HCC): Chronic | ICD-10-CM

## 2021-05-24 DIAGNOSIS — G47.9 SLEEPING DIFFICULTIES: ICD-10-CM

## 2021-05-24 PROCEDURE — 99214 OFFICE O/P EST MOD 30 MIN: CPT | Performed by: NURSE PRACTITIONER

## 2021-05-24 RX ORDER — BUSPIRONE HYDROCHLORIDE 7.5 MG/1
7.5 TABLET ORAL 3 TIMES DAILY
Qty: 270 TABLET | Refills: 0 | Status: SHIPPED | OUTPATIENT
Start: 2021-05-24 | End: 2021-07-27 | Stop reason: SDUPTHER

## 2021-05-24 RX ORDER — FLUOXETINE HYDROCHLORIDE 40 MG/1
80 CAPSULE ORAL DAILY
Qty: 180 CAPSULE | Refills: 0 | Status: SHIPPED | OUTPATIENT
Start: 2021-05-24 | End: 2021-07-27 | Stop reason: SDUPTHER

## 2021-05-24 NOTE — PROGRESS NOTES
"This provider is located at the [facility] (through [facility] [place] [facility]), [address], [facility], [address] using a secure NEBOTRADEhart Video Visit through [employer]. Patient is being seen remotely via telehealth at their home address in [place], and stated they are in a secure environment for this session. The patient's condition being diagnosed/treated is appropriate for telemedicine. The provider identified herself as well as her credentials. The patient, and/or patients guardian, consent to be seen remotely, and when consent is given they understand that the consent allows for patient identifiable information to be sent to a third party as needed. They may refuse to be seen remotely at any time. The electronic data is encrypted and password protected, and the patient and/or guardian has been advised of the potential risks to privacy not withstanding such measures.    You have chosen to receive care through a telehealth visit.  Do you consent to use a video/audio connection for your medical care today? Yes     Subjective   Mallika Brewster is a 56 y.o. female who is here today for medication management follow up.    Chief Complaint: Depression, anxiety, sleeping difficulties    History of Present Illness: The patient describes her mood as \"good\" over the last few weeks.  Patient states she has been doing well since her last visit.  Patient endorses that anxiety has been significantly decreased since increasing the Prozac and BuSpar at last visit.  Patient endorses she still gets anxious at times, but endorses that overall she has seen a significant decrease in anxiety.  The patient reports her appetite as good.  The patient reports her sleep as good.  Patient states that she has not had any nightmares over the past few weeks. Patient states that her nightmares have stopped now that her anxiety is more controlled.  The patient denies any new medical problems or changes in medications " since last appointment with this facility.  The patient reports compliance with current medication regimen.  The patient denies any current side effects from her current medication regimen.  The patient denies any abnormal muscle movements or tics.  The patient rates her depression at a 3/10 on a 0-10 scale, with 10 being the worst.  The patient rates her anxiety at a 4-5/10 on a 0-10 scale, with 10 being the worst.  The patient rates hopelessness at a 0/10 on a 0-10 scale with 10 being the worst.  The patient would like to not adjust or change her medications at this visit.  The patient denies suicidal ideations and homicidal ideations, and is convincing.  The patient denies any auditory hallucinations or visual hallucinations.  The patient does not endorse significant symptoms consistent with bipolar disorder or a psychotic illness.                      The patient denies any chance of pregnancy at this time.  The patient was educated that her prescribed medications can have potential risk to a developing fetus. The patient is advised to contact this APRN/this office if she becomes pregnant or plans to become pregnant.  Pt verbalizes understanding and acknowledged agreement with this plan in her own words.          The following portions of the patient's history were reviewed and updated as appropriate: allergies, current medications, past family history, past medical history, past social history, past surgical history and problem list.    Review of Systems   Constitutional: Negative for activity change, appetite change, fatigue and unexpected weight change.   Psychiatric/Behavioral: Negative for agitation, behavioral problems, confusion, decreased concentration, dysphoric mood, hallucinations, self-injury, sleep disturbance and suicidal ideas. The patient is nervous/anxious. The patient is not hyperactive.        Objective   Physical Exam  Constitutional:       Appearance: Normal appearance.   Neurological:       Mental Status: She is alert.   Psychiatric:         Attention and Perception: Attention and perception normal.         Mood and Affect: Mood and affect normal.         Speech: Speech normal.         Behavior: Behavior normal. Behavior is cooperative.         Thought Content: Thought content normal. Thought content does not include homicidal or suicidal ideation. Thought content does not include homicidal or suicidal plan.         Cognition and Memory: Cognition and memory normal.         Judgment: Judgment normal.       not currently breastfeeding.    The patient was seen remotely today via a MyChart Video Visit through [facility].  Unable to obtain vital signs due to nature of remote visit.  Height stated at 60 inches.  Weight stated at 146 pounds.     No Known Allergies    Recent Results (from the past 2016 hour(s))   Helicobacter Pylori, IgA IgG IgM    Collection Time: 03/16/21  3:40 PM    Specimen: Blood   Result Value Ref Range    H. pylori IgG 0.11 0.00 - 0.79 Index Value    H. pylori, IgA ABS <9.0 0.0 - 8.9 units    H. Pylori, IgM <9.0 0.0 - 8.9 units   Comprehensive Metabolic Panel    Collection Time: 04/27/21  2:58 PM    Specimen: Blood   Result Value Ref Range    Glucose 124 (H) 65 - 99 mg/dL    BUN 13 6 - 20 mg/dL    Creatinine 0.86 0.57 - 1.00 mg/dL    Sodium 135 (L) 136 - 145 mmol/L    Potassium 4.1 3.5 - 5.2 mmol/L    Chloride 99 98 - 107 mmol/L    CO2 23.9 22.0 - 29.0 mmol/L    Calcium 9.0 8.6 - 10.5 mg/dL    Total Protein 6.6 6.0 - 8.5 g/dL    Albumin 4.10 3.50 - 5.20 g/dL    ALT (SGPT) 24 1 - 33 U/L    AST (SGOT) 19 1 - 32 U/L    Alkaline Phosphatase 54 39 - 117 U/L    Total Bilirubin 0.9 0.0 - 1.2 mg/dL    eGFR Non African Amer 69 >60 mL/min/1.73    eGFR  African Amer 83 >60 mL/min/1.73    Globulin 2.5 gm/dL    A/G Ratio 1.6 g/dL    BUN/Creatinine Ratio 15.1 7.0 - 25.0    Anion Gap 12.1 5.0 - 15.0 mmol/L   Hemoglobin A1c    Collection Time: 04/27/21  2:58 PM    Specimen: Blood   Result Value Ref Range     Hemoglobin A1C 6.26 (H) 4.80 - 5.60 %       Current Medications:   Current Outpatient Medications   Medication Sig Dispense Refill   • busPIRone (BUSPAR) 7.5 MG tablet Take 1 tablet by mouth 3 (Three) Times a Day. 270 tablet 0   • famotidine (PEPCID) 40 MG tablet TAKE 1 TABLET TWICE A DAY AS NEEDED FOR HEARTBURN 60 tablet 11   • FLUoxetine (PROzac) 40 MG capsule Take 2 capsules by mouth Daily. 180 capsule 0   • glucose blood (Glucose Meter Test) test strip Use as instructed to check blood glucose levels 3 times daily 100 each 5   • glucose monitor monitoring kit 1 each As Needed (Check blood sugars 3 times daily PRN.). 1 each 0   • JANUVIA 50 MG tablet TAKE 1 TABLET DAILY 90 tablet 3   • Lancets misc Use as instructed to test blood glucose levels 3 times daily. 100 each 5   • linaclotide (LINZESS) 145 MCG capsule capsule Take 1 capsule by mouth Daily. 90 capsule 3   • losartan (COZAAR) 25 MG tablet Take 1 tablet by mouth Daily.     • meloxicam (MOBIC) 15 MG tablet      • metFORMIN (GLUCOPHAGE) 1000 MG tablet      • methocarbamol (ROBAXIN) 750 MG tablet      • metoclopramide (Reglan) 10 MG tablet Take 1 tablet by mouth 4 (Four) Times a Day Before Meals & at Bedtime As Needed (heartburn). 60 tablet 0   • montelukast (SINGULAIR) 10 MG tablet Take 1 tablet by mouth Daily. 90 tablet 3   • nabumetone (RELAFEN) 500 MG tablet      • propranolol (INDERAL) 20 MG tablet Take 1/2 to 1 tablet by mouth 3 times a day as needed for anxiety. 60 tablet 2   • QUEtiapine (SEROquel) 25 MG tablet TAKE 1 TO 2 TABLETS 30 MINUTES BEFORE BEDTIME AS NEEDED FOR SLEEP 180 tablet 3   • RABEprazole (ACIPHEX) 20 MG EC tablet Take 1 tablet by mouth 2 (Two) Times a Day. 90 tablet 3   • simvastatin (ZOCOR) 20 MG tablet Take 1 tablet by mouth Daily.     • sucralfate (CARAFATE) 1 g tablet Take 4 tablets by mouth Daily.       No current facility-administered medications for this visit.       Mental Status Exam:   Hygiene:   good  Cooperation:   Cooperative  Eye Contact:  Good  Psychomotor Behavior:  Appropriate  Affect:  Full range  Hopelessness: Denies  Speech:  Normal  Thought Process:  Goal directed  Thought Content:  Normal  Suicidal:  None  Homicidal:  None  Hallucinations:  None  Delusion:  None  Memory:  Intact  Orientation:  Person, Place, Time and Situation  Reliability:  good  Insight:  Good  Judgement:  Good  Impulse Control:  Good  Physical/Medical Issues:  Yes See medical history      Assessment/Plan   Diagnoses and all orders for this visit:    1. Generalized anxiety disorder (Primary)  -     FLUoxetine (PROzac) 40 MG capsule; Take 2 capsules by mouth Daily.  Dispense: 180 capsule; Refill: 0  -     busPIRone (BUSPAR) 7.5 MG tablet; Take 1 tablet by mouth 3 (Three) Times a Day.  Dispense: 270 tablet; Refill: 0    2. Panic disorder (episodic paroxysmal anxiety)  -     FLUoxetine (PROzac) 40 MG capsule; Take 2 capsules by mouth Daily.  Dispense: 180 capsule; Refill: 0  -     busPIRone (BUSPAR) 7.5 MG tablet; Take 1 tablet by mouth 3 (Three) Times a Day.  Dispense: 270 tablet; Refill: 0    3. Post traumatic stress disorder (PTSD)    4. Mild episode of recurrent major depressive disorder (CMS/HCC)  -     FLUoxetine (PROzac) 40 MG capsule; Take 2 capsules by mouth Daily.  Dispense: 180 capsule; Refill: 0    5. Sleeping difficulties            Visit Diagnoses:    ICD-10-CM ICD-9-CM   1. Generalized anxiety disorder  F41.1 300.02   2. Panic disorder (episodic paroxysmal anxiety)  F41.0 300.01   3. Post traumatic stress disorder (PTSD)  F43.10 309.81   4. Mild episode of recurrent major depressive disorder (CMS/HCC)  F33.0 296.31   5. Sleeping difficulties  G47.9 780.50       GOALS:  Short Term Goals: Patient will be compliant with medication, and patient will have no significant medication related side effects.  Patient will be engaged in psychotherapy as indicated.  Patient will report subjective improvement of symptoms.  Long term goals: To  stabilize mood and treat/improve subjective symptoms, the patient will stay out of the hospital, the patient will be at an optimal level of functioning, and the patient will take all medications as prescribed.  The patient verbalized understanding and agreement with goals that were mutually set.      TREATMENT PLAN/GOALS: Continue medications and treatment plan as indicated. Treatment and medication options discussed during today's visit. Patient acknowledged and verbally consented to continue with current treatment plan and was educated on the importance of compliance with treatment and follow-up appointments.      -Continue Buspar 7.5 mg TID   -Continue Prozac 80 mg daily  -Continue Seroquel 50 mg nightly as needed for sleep.  Patient endorses she does not need a refill on this medication currently due to recently receiving refill from PCP.  -Continue Propanolol 10-20 mg three times daily as needed for anxiety.  Patient endorses that she does not need a refill at this time.  -Continue psychotherapy       MEDICATION ISSUES: Discussed medication options and treatment plan of prescribed medication, any off label use of medication, as well as the risks, benefits, any black box warnings including increased suicidality, and side effects including but not limited to potential falls, dizziness, possible impaired driving, GI side effects (change in appetite, abdominal discomfort, nausea, vomiting, diarrhea, and/or constipation), dry mouth, somnolence, sedation, insomnia, activation, agitation, irritation, tremors, abnormal muscle movements or disorders, tardive dyskinesia, akathisia, asthenia, headache, sweating, possible bruising or rare bleeding, electrolyte and/or fluid abnormalities, change in blood pressure/heart rate/and or heart rhythm, hypotension, sexual dysfunction, rare impulse control problems, rare seizures, rare neuroleptic malignant syndrome, increased risk of death and cerebrovascular events, change in  blood glucose and increased risk for diabetes, change in triglycerides and cholesterol and increased risk for dyslipidemia,  weight gain, weight gain that can become problematic to health, skin conditions and reactions, and metabolic adversities among others. Patient and/or guardian are agreeable to call the office with any worsening of symptoms or onset of side effects, or if any concerns or questions arise.  The contact information for the office is made available to the patient and/or guardian. Patient and/or guardian are agreeable to call 911 or go to the nearest ER should they begin having any SI/HI, or if any urgent concerns arise. No medication side effects or related complaints today.    This APRN has discussed with the patient/guardian about the possibility of serotonin syndrome when certain medications are taken together, as is the case with this patient.  This APRN has provided the patient/guardian with a list of symptoms of serotonin syndrome including symptoms of autonomic instability, altered sensorium, confusion, restlessness, agitation, myoclonus, hyperreflexia, hyperthermia, diaphoresis, tremor, chills, diarrhea and cramps, ataxia, headache, migraines, seizures, and insomnia; which could lead to permanent hyperthermic brain damage, cardiovascular collapse, coma, or even death.  The patient/guardian are instructed to stop medications immediately and either contact this APRN/this office during regular office hours, or go to the emergency department/call 911, if they begin to experience any of the symptoms discussed.  The benefits and risks of the current medication regimen are discussed with the patient/guardian, and they feel that the benefits out weigh the risks.  The patient/guardian verbalized understanding and agreement in their own words.      MEDS ORDERED DURING VISIT:  New Medications Ordered This Visit   Medications   • FLUoxetine (PROzac) 40 MG capsule     Sig: Take 2 capsules by mouth  Daily.     Dispense:  180 capsule     Refill:  0   • busPIRone (BUSPAR) 7.5 MG tablet     Sig: Take 1 tablet by mouth 3 (Three) Times a Day.     Dispense:  270 tablet     Refill:  0       Return in about 4 weeks (around 6/21/2021), or if symptoms worsen or fail to improve, for Recheck.        Patient will follow-up in 4 weeks, highly encouraged the patient if she had any questions or concerns to contact the behavioral health [facility] clinic for sooner appointment patient verbalized understanding.      Functional Status: Moderate impairment     Prognosis: Guarded with Ongoing Treatment            This document has been electronically signed by BHARAT Partida  May 24, 2021 08:06 EDT    Part of this note may be an electronic transcription/translation of spoken language to printed text using the Dragon Dictation System.

## 2021-05-25 ENCOUNTER — TREATMENT (OUTPATIENT)
Dept: PHYSICAL THERAPY | Facility: CLINIC | Age: 56
End: 2021-05-25

## 2021-05-25 DIAGNOSIS — M54.2 NECK PAIN: Primary | ICD-10-CM

## 2021-05-25 DIAGNOSIS — G89.29 CHRONIC THORACIC BACK PAIN, UNSPECIFIED BACK PAIN LATERALITY: ICD-10-CM

## 2021-05-25 DIAGNOSIS — M54.6 CHRONIC THORACIC BACK PAIN, UNSPECIFIED BACK PAIN LATERALITY: ICD-10-CM

## 2021-05-25 PROCEDURE — 97110 THERAPEUTIC EXERCISES: CPT | Performed by: PHYSICAL THERAPIST

## 2021-05-25 PROCEDURE — 97161 PT EVAL LOW COMPLEX 20 MIN: CPT | Performed by: PHYSICAL THERAPIST

## 2021-05-25 NOTE — PROGRESS NOTES
Physical Therapy Initial Evaluation and Plan of Care        Patient: Mallika Brewster   : 1965  Diagnosis/ICD-10 Code:  Neck pain [M54.2]  Referring practitioner: No ref. provider found  Date of Initial Visit: 2021  Today's Date: 2021  Patient seen for 1 sessions           Subjective Questionnaire: NDI:22%        Subjective Evaluation    History of Present Illness  Date of onset: 2011  Mechanism of injury: Patient notes chronic history of thoracic and bilateral trap pain with soreness.  She attributes pain to large breasts.  She has had PT in the past with mild to moderate relief.  Failed relief with meloxicam.  Pain is worse upon waking but can be more severe at the middle or end of the day as well.  No radiation into the arms or legs.        Patient Occupation: used to work in restaurant Pain  Current pain ratin  At best pain ratin  At worst pain ratin  Quality: dull ache  Relieving factors: change in position  Aggravating factors: prolonged positioning, repetitive movement and overhead activity    Social Support  Lives with: adult children and spouse    Hand dominance: right    Treatments  Previous treatment: physical therapy and medication  Patient Goals  Patient goals for therapy: decreased pain, increased motion, increased strength and independence with ADLs/IADLs             Objective          Postural Observations    Additional Postural Observation Details  Large breasts, smaller frame.  Forward head and rounded shoulder posture.      Palpation     Additional Palpation Details  Significant bilateral upper trap and posterior cervical muscle guarding with tenderness.  Guarding and tenderness along the periscapular muscles.  TTP with CPA over the lower cervical and upper thoracic segments.      Active Range of Motion   Cervical/Thoracic Spine   Normal active range of motion  Cervical    Flexion: with pain    Strength/Myotome Testing     Left Shoulder     Planes of Motion    Flexion: 4+   Abduction: 4+   External rotation at 0°: 4+     Isolated Muscles   Lower trapezius: 2+   Middle trapezius: 3     Right Shoulder     Planes of Motion   Flexion: 4+   Abduction: 4+   External rotation at 0°: 4+     Isolated Muscles   Lower trapezius: 2+   Middle trapezius: 3     Left Elbow   Flexion: 5  Extension: 5    Right Elbow   Flexion: 5  Extension: 4    Left Wrist/Hand   Wrist extension: 5  Wrist flexion: 5    Right Wrist/Hand   Wrist extension: 5  Wrist flexion: 5    Tests   Cervical     Left   Negative active compression ([place]) and Spurling's sign.     Right   Negative active compression ([place]) and Spurling's sign.           Assessment & Plan     Assessment  Impairments: abnormal or restricted ROM, activity intolerance, impaired physical strength, lacks appropriate home exercise program and pain with function  Assessment details: Patient presents with chronic neck and thoracic pain secondary to large breast size and postural deficits.  She demonstrates significant upper trap and posterior cervical guarding and tenderness to palpation.  Pain is worse with repetitive activities and worse upon waking and throughout the day.  She should experience some relief with physical therapy directed at the cervicothoracic complex but there likely will be long term issues due to her postural deficits and large breasts that contribute to her deficits.  Functional Limitations: carrying objects, lifting, pulling, pushing, uncomfortable because of pain, reaching behind back, reaching overhead and unable to perform repetitive tasks  Goals  Plan Goals: Short Term Goals 2-3 weeks  1. Patient to be compliant with initial HEP  2. Patient to demonstrate pain free cervical flexion.      Long Term Goals 4-6 weeks  1. Patient to be independent with HEP.  2. Patient to report tolerate bilateral upper trap palpation without extremity withdrawal  3. Patient to sleep for at least 6 hours without waking due to pain.  4.  Patient to demonstrate minimal palpable tenderness along the cervical spine.  5. Patient to improve NDI score to at least 14% %.        Plan  Therapy options: will be seen for skilled physical therapy services  Planned modality interventions: TENS, thermotherapy (hydrocollator packs), cryotherapy, ultrasound and dry needling  Planned therapy interventions: manual therapy, strengthening, stretching, postural training, joint mobilization and neuromuscular re-education  Frequency: 1x week  Duration in visits: 6  Treatment plan discussed with: patient  Plan details: 1x wk up to 6 weeks for thoracic mobility, scapular and cervical strengthening, RTC strengthening, manual and modalities as indicated based on pain and function.        Timed:  Manual Therapy:    0     mins  41630;  Therapeutic Exercise:    11     mins  44930;     Neuromuscular Lucy:    0    mins  67514;    Therapeutic Activity:     0     mins  80595;     Gait Trainin     mins  19290;     Ultrasound:     0     mins  29501;    Electrical Stimulation:    0     mins  11149 ( );    Untimed:  Electrical Stimulation:    0     mins  59784 ( );  Mechanical Traction:    0     mins  13895;     Timed Treatment:   11   mins   Total Treatment:     38   mins    PT SIGNATURE: Mitch Buck, PT   DATE TREATMENT INITIATED: 2021    Initial Certification  Certification Period: 2021  I certify that the therapy services are furnished while this patient is under my care.  The services outlined above are required by this patient, and will be reviewed every 90 days.     PHYSICIAN:       DATE:     Please sign and return via fax to [phone].. Thank you, [facility].

## 2021-06-07 ENCOUNTER — TREATMENT (OUTPATIENT)
Dept: PHYSICAL THERAPY | Facility: CLINIC | Age: 56
End: 2021-06-07

## 2021-06-07 DIAGNOSIS — M54.6 CHRONIC THORACIC BACK PAIN, UNSPECIFIED BACK PAIN LATERALITY: ICD-10-CM

## 2021-06-07 DIAGNOSIS — G89.29 CHRONIC THORACIC BACK PAIN, UNSPECIFIED BACK PAIN LATERALITY: ICD-10-CM

## 2021-06-07 DIAGNOSIS — M54.2 NECK PAIN: Primary | ICD-10-CM

## 2021-06-07 PROCEDURE — 97110 THERAPEUTIC EXERCISES: CPT | Performed by: PHYSICAL THERAPIST

## 2021-06-07 PROCEDURE — 97140 MANUAL THERAPY 1/> REGIONS: CPT | Performed by: PHYSICAL THERAPIST

## 2021-06-07 NOTE — PROGRESS NOTES
Physical Therapy Daily Progress Note        Patient: Mallika Brewster   : 1965  Diagnosis/ICD-10 Code:  Neck pain [M54.2]  Referring practitioner: mAanda Orourke*  Date of Initial Visit: Type: THERAPY  Noted: 2021  Today's Date: 2021  Patient seen for 2 sessions         Patient reports that she is still having pain from her neck and back but she is complaining of a right shoulder issue the last several days.  She has no known injury but pain with abducting or elevating the arm, specifically into IR. Her pain level is 5/10 upon arrival.          Objective   TTP over proximal biceps, (+) speeds, uppercut, HK    See Exercise, Manual, and Modality Logs for complete treatment.       Assessment & Plan     Assessment  Assessment details: Patient tolerates treatment well overall.  Her shoulder pain responds favorably to treatment and was a barrier to significant progression today.      Plan  Plan details: Add qped exercises.            Timed:  Manual Therapy:    15     mins  74600;  Therapeutic Exercise:    26     mins  63286;     Neuromuscular Lucy:    0    mins  05592;    Therapeutic Activity:     0     mins  17950;     Gait Trainin     mins  46045;     Ultrasound:     0     mins  96953;    Electrical Stimulation:    0     mins  37615 ( );    Untimed:  Electrical Stimulation:    0     mins  59581 ( );  Mechanical Traction:    0     mins  11206;     Timed Treatment:   41   mins   Total Treatment:     41   mins    Mitch Buck, PT  Physical Therapist

## 2021-06-15 ENCOUNTER — TREATMENT (OUTPATIENT)
Dept: PHYSICAL THERAPY | Facility: CLINIC | Age: 56
End: 2021-06-15

## 2021-06-15 ENCOUNTER — TELEPHONE (OUTPATIENT)
Dept: INTERNAL MEDICINE | Facility: CLINIC | Age: 56
End: 2021-06-15

## 2021-06-15 DIAGNOSIS — G89.29 CHRONIC THORACIC BACK PAIN, UNSPECIFIED BACK PAIN LATERALITY: ICD-10-CM

## 2021-06-15 DIAGNOSIS — M54.2 NECK PAIN: Primary | ICD-10-CM

## 2021-06-15 DIAGNOSIS — M54.6 CHRONIC THORACIC BACK PAIN, UNSPECIFIED BACK PAIN LATERALITY: ICD-10-CM

## 2021-06-15 PROCEDURE — 97140 MANUAL THERAPY 1/> REGIONS: CPT | Performed by: PHYSICAL THERAPIST

## 2021-06-15 PROCEDURE — 97110 THERAPEUTIC EXERCISES: CPT | Performed by: PHYSICAL THERAPIST

## 2021-06-15 NOTE — PROGRESS NOTES
Physical Therapy Daily Progress Note        Patient: Mallika Brewster   : 1965  Diagnosis/ICD-10 Code:  Neck pain [M54.2]  Referring practitioner: No ref. provider found  Date of Initial Visit: Type: THERAPY  Noted: 2021  Today's Date: 6/15/2021  Patient seen for 3 sessions         Patient reports that her right shoulder is doing better but her baseline pain of lower cervical/thoracic pain is persistent and unchanged. Her pain level is 5/10 upon arrival.          Objective   Tender with PA pressures along the thoracolumbar region centrally.  Most tender along the CT junction.  No reduction in PPT post manipulation.      See Exercise, Manual, and Modality Logs for complete treatment.       Assessment & Plan     Assessment  Assessment details: Patient demonstrate minimal change in pain post mainpulation after gaining informed consent.  She continues to demonstrate increased postural tension likely secondary to increased breast size on her otherwise smaller frame.  There may be benefit from long term strengthening and will continue to progress HEP.    Plan  Plan details: Add hands on wall exercises.            Timed:  Manual Therapy:    11     mins  69967;  Therapeutic Exercise:    28     mins  44945;     Neuromuscular Lucy:    0    mins  88868;    Therapeutic Activity:     0     mins  57800;     Gait Trainin     mins  57553;     Ultrasound:     0     mins  00174;    Electrical Stimulation:    0     mins  68666 ( );    Untimed:  Electrical Stimulation:    0     mins  23717 ( );  Mechanical Traction:    0     mins  91202;     Timed Treatment:   39   mins   Total Treatment:     39   mins    Mitch Buck, PT  Physical Therapist

## 2021-06-21 ENCOUNTER — TELEMEDICINE (OUTPATIENT)
Dept: PSYCHIATRY | Facility: CLINIC | Age: 56
End: 2021-06-21

## 2021-06-21 DIAGNOSIS — F41.0 PANIC DISORDER (EPISODIC PAROXYSMAL ANXIETY): ICD-10-CM

## 2021-06-21 DIAGNOSIS — G47.9 SLEEPING DIFFICULTIES: ICD-10-CM

## 2021-06-21 DIAGNOSIS — F43.10 POST TRAUMATIC STRESS DISORDER (PTSD): ICD-10-CM

## 2021-06-21 DIAGNOSIS — F41.1 GENERALIZED ANXIETY DISORDER: Primary | Chronic | ICD-10-CM

## 2021-06-21 DIAGNOSIS — F33.0 MILD EPISODE OF RECURRENT MAJOR DEPRESSIVE DISORDER (HCC): Chronic | ICD-10-CM

## 2021-06-21 PROCEDURE — 99214 OFFICE O/P EST MOD 30 MIN: CPT | Performed by: NURSE PRACTITIONER

## 2021-06-21 NOTE — PROGRESS NOTES
"This provider is located at the [facility] (through [facility]), [address], [facility], [address] using a secure [employer] Video Visit through [employer]. Patient is being seen remotely via telehealth at their home address in [place], and stated they are in a secure environment for this session. The patient's condition being diagnosed/treated is appropriate for telemedicine. The provider identified herself as well as her credentials. The patient, and/or patients guardian, consent to be seen remotely, and when consent is given they understand that the consent allows for patient identifiable information to be sent to a third party as needed. They may refuse to be seen remotely at any time. The electronic data is encrypted and password protected, and the patient and/or guardian has been advised of the potential risks to privacy not withstanding such measures.    You have chosen to receive care through a telehealth visit.  Do you consent to use a video/audio connection for your medical care today? Yes     Subjective   Mallika Brewster is a 56 y.o. female who is here today for medication management follow up.    Chief Complaint: Depression, anxiety, sleeping difficulties    History of Present Illness: The patient describes her mood as \"good\" over the last few weeks.  Patient states that she is getting her service dog tomorrow and she is very excited about this. Patient states that she has been somewhat nervious about getting a new dog, but states that she is very excited as well.  The patient reports her appetite as good.  The patient reports her sleep as good.  The patient denies any nightmares or bad dreams.  Patient states that last week she did have the same dream 2-3 times that week, but states that it was not a nightmare. Patient states that overall she sleep has been good since beginning the Seroquel and states that she is pleased with her sleep currently.  The patient " denies any new medical problems or changes in medications since last appointment with this facility.  The patient reports compliance with current medication regimen.  The patient denies any current side effects from her current medication regimen.  The patient denies any abnormal muscle movements or tics.  The patient rates her depression at a 3/10 on a 0-10 scale, with 10 being the worst.  The patient rates her anxiety at a 6-7/10 on a 0-10 scale, with 10 being the worst.  Patient states that her anxiety has been a little increased over the past few weeks. Patient states that her son and  make her irritable and increase her anxiety. Patient denies any panic attacks.   The patient rates hopelessness at a 0/10 on a 0-10 scale with 10 being the worst.  The patient would like to not adjust or change her medications at this visit.  Patient states that she would like to see if getting her service dog helps her anxiety before we make any medication changes.  The patient denies suicidal ideations and homicidal ideations, and is convincing.  The patient denies any auditory hallucinations or visual hallucinations.  The patient does not endorse significant symptoms consistent with bipolar disorder or a psychotic illness.                The patient denies any chance of pregnancy at this time.  The patient was educated that her prescribed medications can have potential risk to a developing fetus. The patient is advised to contact this APRN/this office if she becomes pregnant or plans to become pregnant.  Pt verbalizes understanding and acknowledged agreement with this plan in her own words.          The following portions of the patient's history were reviewed and updated as appropriate: allergies, current medications, past family history, past medical history, past social history, past surgical history and problem list.    Review of Systems   Constitutional: Negative for activity change, appetite change, fatigue and  unexpected weight change.   Psychiatric/Behavioral: Negative for agitation, behavioral problems, confusion, decreased concentration, dysphoric mood, hallucinations, self-injury, sleep disturbance and suicidal ideas. The patient is nervous/anxious. The patient is not hyperactive.        Objective   Physical Exam  Constitutional:       Appearance: Normal appearance.   Neurological:      Mental Status: She is alert.   Psychiatric:         Attention and Perception: Attention and perception normal.         Mood and Affect: Affect normal. Mood is anxious.         Speech: Speech normal.         Behavior: Behavior normal. Behavior is cooperative.         Thought Content: Thought content normal. Thought content does not include homicidal or suicidal ideation. Thought content does not include homicidal or suicidal plan.         Cognition and Memory: Cognition and memory normal.         Judgment: Judgment normal.       not currently breastfeeding.    The patient was seen remotely today via a [employer] Video Visit through [employer].  Unable to obtain vital signs due to nature of remote visit.  Height stated at 60 inches.  Weight stated at 146 pounds.     No Known Allergies    Recent Results (from the past 2016 hour(s))   Comprehensive Metabolic Panel    Collection Time: 04/27/21  2:58 PM    Specimen: Blood   Result Value Ref Range    Glucose 124 (H) 65 - 99 mg/dL    BUN 13 6 - 20 mg/dL    Creatinine 0.86 0.57 - 1.00 mg/dL    Sodium 135 (L) 136 - 145 mmol/L    Potassium 4.1 3.5 - 5.2 mmol/L    Chloride 99 98 - 107 mmol/L    CO2 23.9 22.0 - 29.0 mmol/L    Calcium 9.0 8.6 - 10.5 mg/dL    Total Protein 6.6 6.0 - 8.5 g/dL    Albumin 4.10 3.50 - 5.20 g/dL    ALT (SGPT) 24 1 - 33 U/L    AST (SGOT) 19 1 - 32 U/L    Alkaline Phosphatase 54 39 - 117 U/L    Total Bilirubin 0.9 0.0 - 1.2 mg/dL    eGFR Non African Amer 69 >60 mL/min/1.73    eGFR  African Amer 83 >60 mL/min/1.73    Globulin 2.5 gm/dL    A/G Ratio 1.6 g/dL    BUN/Creatinine Ratio  15.1 7.0 - 25.0    Anion Gap 12.1 5.0 - 15.0 mmol/L   Hemoglobin A1c    Collection Time: 04/27/21  2:58 PM    Specimen: Blood   Result Value Ref Range    Hemoglobin A1C 6.26 (H) 4.80 - 5.60 %       Current Medications:   Current Outpatient Medications   Medication Sig Dispense Refill   • busPIRone (BUSPAR) 7.5 MG tablet Take 1 tablet by mouth 3 (Three) Times a Day. 270 tablet 0   • famotidine (PEPCID) 40 MG tablet TAKE 1 TABLET TWICE A DAY AS NEEDED FOR HEARTBURN 60 tablet 11   • FLUoxetine (PROzac) 40 MG capsule Take 2 capsules by mouth Daily. 180 capsule 0   • glucose blood (Glucose Meter Test) test strip Use as instructed to check blood glucose levels 3 times daily 100 each 5   • glucose monitor monitoring kit 1 each As Needed (Check blood sugars 3 times daily PRN.). 1 each 0   • JANUVIA 50 MG tablet TAKE 1 TABLET DAILY 90 tablet 3   • Lancets misc Use as instructed to test blood glucose levels 3 times daily. 100 each 5   • linaclotide (LINZESS) 145 MCG capsule capsule Take 1 capsule by mouth Daily. 90 capsule 3   • losartan (COZAAR) 25 MG tablet Take 1 tablet by mouth Daily.     • meloxicam (MOBIC) 15 MG tablet      • metFORMIN (GLUCOPHAGE) 1000 MG tablet      • methocarbamol (ROBAXIN) 750 MG tablet      • metoclopramide (Reglan) 10 MG tablet Take 1 tablet by mouth 4 (Four) Times a Day Before Meals & at Bedtime As Needed (heartburn). 60 tablet 0   • montelukast (SINGULAIR) 10 MG tablet Take 1 tablet by mouth Daily. 90 tablet 3   • nabumetone (RELAFEN) 500 MG tablet      • propranolol (INDERAL) 20 MG tablet Take 1/2 to 1 tablet by mouth 3 times a day as needed for anxiety. 60 tablet 2   • QUEtiapine (SEROquel) 25 MG tablet TAKE 1 TO 2 TABLETS 30 MINUTES BEFORE BEDTIME AS NEEDED FOR SLEEP 180 tablet 3   • RABEprazole (ACIPHEX) 20 MG EC tablet Take 1 tablet by mouth 2 (Two) Times a Day. 90 tablet 3   • simvastatin (ZOCOR) 20 MG tablet Take 1 tablet by mouth Daily.     • sucralfate (CARAFATE) 1 g tablet Take 4  tablets by mouth Daily.       No current facility-administered medications for this visit.       Mental Status Exam:   Hygiene:   good  Cooperation:  Cooperative  Eye Contact:  Good  Psychomotor Behavior:  Appropriate  Affect:  Full range  Hopelessness: Denies  Speech:  Normal  Thought Process:  Goal directed  Thought Content:  Normal  Suicidal:  None  Homicidal:  None  Hallucinations:  None  Delusion:  None  Memory:  Intact  Orientation:  Person, Place, Time and Situation  Reliability:  good  Insight:  Good  Judgement:  Good  Impulse Control:  Good  Physical/Medical Issues:  Yes See medical history      Assessment/Plan   Diagnoses and all orders for this visit:    1. Generalized anxiety disorder (Primary)    2. Panic disorder (episodic paroxysmal anxiety)    3. Post traumatic stress disorder (PTSD)    4. Mild episode of recurrent major depressive disorder (CMS/HCC)    5. Sleeping difficulties            Visit Diagnoses:    ICD-10-CM ICD-9-CM   1. Generalized anxiety disorder  F41.1 300.02   2. Panic disorder (episodic paroxysmal anxiety)  F41.0 300.01   3. Post traumatic stress disorder (PTSD)  F43.10 309.81   4. Mild episode of recurrent major depressive disorder (CMS/HCC)  F33.0 296.31   5. Sleeping difficulties  G47.9 780.50       GOALS:  Short Term Goals: Patient will be compliant with medication, and patient will have no significant medication related side effects.  Patient will be engaged in psychotherapy as indicated.  Patient will report subjective improvement of symptoms.  Long term goals: To stabilize mood and treat/improve subjective symptoms, the patient will stay out of the hospital, the patient will be at an optimal level of functioning, and the patient will take all medications as prescribed.  The patient verbalized understanding and agreement with goals that were mutually set.      TREATMENT PLAN/GOALS: Continue medications and treatment plan as indicated. Treatment and medication options discussed  during today's visit. Patient acknowledged and verbally consented to continue with current treatment plan and was educated on the importance of compliance with treatment and follow-up appointments.      -Continue Buspar 7.5 mg TID   -Continue Prozac 80 mg daily  -Continue Seroquel 50 mg nightly as needed for sleep  -Continue Propanolol 10-20 mg three times daily as needed for anxiety  -Continue psychotherapy   -No refills sent in at this time due to 90-day supply of medications being sent in at last visit. Will refill when appropriate.       MEDICATION ISSUES: Discussed medication options and treatment plan of prescribed medication, any off label use of medication, as well as the risks, benefits, any black box warnings including increased suicidality, and side effects including but not limited to potential falls, dizziness, possible impaired driving, GI side effects (change in appetite, abdominal discomfort, nausea, vomiting, diarrhea, and/or constipation), dry mouth, somnolence, sedation, insomnia, activation, agitation, irritation, tremors, abnormal muscle movements or disorders, tardive dyskinesia, akathisia, asthenia, headache, sweating, possible bruising or rare bleeding, electrolyte and/or fluid abnormalities, change in blood pressure/heart rate/and or heart rhythm, hypotension, sexual dysfunction, rare impulse control problems, rare seizures, rare neuroleptic malignant syndrome, increased risk of death and cerebrovascular events, change in blood glucose and increased risk for diabetes, change in triglycerides and cholesterol and increased risk for dyslipidemia,  weight gain, weight gain that can become problematic to health, skin conditions and reactions, and metabolic adversities among others. Patient and/or guardian are agreeable to call the office with any worsening of symptoms or onset of side effects, or if any concerns or questions arise.  The contact information for the office is made available to the  patient and/or guardian. Patient and/or guardian are agreeable to call 911 or go to the nearest ER should they begin having any SI/HI, or if any urgent concerns arise. No medication side effects or related complaints today.    This APRN has discussed with the patient/guardian about the possibility of serotonin syndrome when certain medications are taken together, as is the case with this patient.  This APRN has provided the patient/guardian with a list of symptoms of serotonin syndrome including symptoms of autonomic instability, altered sensorium, confusion, restlessness, agitation, myoclonus, hyperreflexia, hyperthermia, diaphoresis, tremor, chills, diarrhea and cramps, ataxia, headache, migraines, seizures, and insomnia; which could lead to permanent hyperthermic brain damage, cardiovascular collapse, coma, or even death.  The patient/guardian are instructed to stop medications immediately and either contact this APRN/this office during regular office hours, or go to the emergency department/call 911, if they begin to experience any of the symptoms discussed.  The benefits and risks of the current medication regimen are discussed with the patient/guardian, and they feel that the benefits out weigh the risks.  The patient/guardian verbalized understanding and agreement in their own words.      MEDS ORDERED DURING VISIT:  No orders of the defined types were placed in this encounter.      Return in about 6 weeks (around 8/2/2021), or if symptoms worsen or fail to improve, for Recheck.        Patient will follow-up in 6 weeks, highly encouraged the patient if she had any questions or concerns to contact the behavioral health virtual clinic for sooner appointment patient verbalized understanding.      Functional Status: Mild impairment     Prognosis: Fair with Ongoing Treatment             This document has been electronically signed by BHARAT Partida  June 21, 2021 08:35 EDT    Part of this note may be an  electronic transcription/translation of spoken language to printed text using the Dragon Dictation System.

## 2021-06-22 ENCOUNTER — TREATMENT (OUTPATIENT)
Dept: PHYSICAL THERAPY | Facility: CLINIC | Age: 56
End: 2021-06-22

## 2021-06-22 DIAGNOSIS — M54.2 NECK PAIN: Primary | ICD-10-CM

## 2021-06-22 DIAGNOSIS — M54.6 CHRONIC THORACIC BACK PAIN, UNSPECIFIED BACK PAIN LATERALITY: ICD-10-CM

## 2021-06-22 DIAGNOSIS — G89.29 CHRONIC THORACIC BACK PAIN, UNSPECIFIED BACK PAIN LATERALITY: ICD-10-CM

## 2021-06-22 PROCEDURE — 97140 MANUAL THERAPY 1/> REGIONS: CPT | Performed by: PHYSICAL THERAPIST

## 2021-06-22 PROCEDURE — 97110 THERAPEUTIC EXERCISES: CPT | Performed by: PHYSICAL THERAPIST

## 2021-06-22 NOTE — PROGRESS NOTES
Physical Therapy Daily Progress Note        Patient: Mallika Brewster   : 1965  Diagnosis/ICD-10 Code:  Neck pain [M54.2]  Referring practitioner: Amanda Orourke*  Date of Initial Visit: Type: THERAPY  Noted: 2021  Today's Date: 2021  Patient seen for 4 sessions         Patient reports that she is working on her exercises and her pain is unchanged.  She is feels really stiff since last week. Her pain level is 4/10 upon arrival.          Objective   See Exercise, Manual, and Modality Logs for complete treatment.       Assessment & Plan     Assessment  Assessment details: Patient tolerates treatment well overall but she struggles with exercises putting her arms on the wall due to lat tightness.  Her progress has been slow, she is due reassessment next week and could transition to I HEP.              Timed:  Manual Therapy:    11     mins  53675;  Therapeutic Exercise:    29     mins  82476;     Neuromuscular Lucy:    0    mins  20486;    Therapeutic Activity:     0     mins  85256;     Gait Trainin     mins  24366;     Ultrasound:     0     mins  59083;    Electrical Stimulation:    0     mins  98611 ( );    Untimed:  Electrical Stimulation:    0     mins  08314 ( );  Mechanical Traction:    0     mins  58511;     Timed Treatment:   40   mins   Total Treatment:     40   mins    Mitch Buck PT  Physical Therapist

## 2021-06-23 ENCOUNTER — OFFICE VISIT (OUTPATIENT)
Dept: INTERNAL MEDICINE | Facility: CLINIC | Age: 56
End: 2021-06-23

## 2021-06-23 VITALS
WEIGHT: 147 LBS | HEIGHT: 60 IN | RESPIRATION RATE: 16 BRPM | OXYGEN SATURATION: 98 % | BODY MASS INDEX: 28.86 KG/M2 | HEART RATE: 67 BPM | SYSTOLIC BLOOD PRESSURE: 118 MMHG | DIASTOLIC BLOOD PRESSURE: 78 MMHG | TEMPERATURE: 96.6 F

## 2021-06-23 DIAGNOSIS — M54.2 CERVICAL PAIN: ICD-10-CM

## 2021-06-23 DIAGNOSIS — Z79.4 TYPE 2 DIABETES MELLITUS WITHOUT COMPLICATION, WITH LONG-TERM CURRENT USE OF INSULIN (HCC): Primary | ICD-10-CM

## 2021-06-23 DIAGNOSIS — E11.9 TYPE 2 DIABETES MELLITUS WITHOUT COMPLICATION, WITH LONG-TERM CURRENT USE OF INSULIN (HCC): Primary | ICD-10-CM

## 2021-06-23 DIAGNOSIS — I10 ESSENTIAL HYPERTENSION: ICD-10-CM

## 2021-06-23 DIAGNOSIS — M54.50 LUMBAR PAIN: ICD-10-CM

## 2021-06-23 DIAGNOSIS — M54.6 CHRONIC BILATERAL THORACIC BACK PAIN: ICD-10-CM

## 2021-06-23 DIAGNOSIS — G89.29 CHRONIC BILATERAL THORACIC BACK PAIN: ICD-10-CM

## 2021-06-23 PROCEDURE — 99214 OFFICE O/P EST MOD 30 MIN: CPT | Performed by: NURSE PRACTITIONER

## 2021-06-29 ENCOUNTER — TELEPHONE (OUTPATIENT)
Dept: INTERNAL MEDICINE | Facility: CLINIC | Age: 56
End: 2021-06-29

## 2021-06-29 ENCOUNTER — TREATMENT (OUTPATIENT)
Dept: PHYSICAL THERAPY | Facility: CLINIC | Age: 56
End: 2021-06-29

## 2021-06-29 DIAGNOSIS — M54.2 NECK PAIN: ICD-10-CM

## 2021-06-29 DIAGNOSIS — G89.29 CHRONIC THORACIC BACK PAIN, UNSPECIFIED BACK PAIN LATERALITY: Primary | ICD-10-CM

## 2021-06-29 DIAGNOSIS — M54.6 CHRONIC THORACIC BACK PAIN, UNSPECIFIED BACK PAIN LATERALITY: Primary | ICD-10-CM

## 2021-06-29 PROCEDURE — 97530 THERAPEUTIC ACTIVITIES: CPT | Performed by: PHYSICAL THERAPIST

## 2021-06-29 PROCEDURE — 97110 THERAPEUTIC EXERCISES: CPT | Performed by: PHYSICAL THERAPIST

## 2021-06-29 PROCEDURE — 97140 MANUAL THERAPY 1/> REGIONS: CPT | Performed by: PHYSICAL THERAPIST

## 2021-06-29 NOTE — PROGRESS NOTES
Physical Therapy Initial Evaluation and Plan of Care        Patient: Mallika Brewster   : 1965  Diagnosis/ICD-10 Code:  Chronic thoracic back pain, unspecified back pain laterality [M54.6, G89.29]  Referring practitioner: Amanda Orourke*  Date of Initial Visit: 2021  Today's Date: 2021  Patient seen for 5 sessions           Subjective Questionnaire: NDI:48%        Subjective Evaluation    History of Present Illness    Subjective comment: Patient notes minimal change in her symptoms overall.  If anything, she may be slightly better.  She has been doing her exercises but notes a lot of postural tension in her lats and thoracic spine region.Pain  Current pain ratin             Objective          Postural Observations    Additional Postural Observation Details  Large breasts, smaller frame. Forward head and rounded shoulder posture.      Palpation     Additional Palpation Details  Significant bilateral upper trap and posterior cervical muscle guarding with tenderness.  Guarding and tenderness along the periscapular muscles.  TTP with CPA over the lower cervical and upper thoracic segments.  TTP over the lats near the humeral attachment    Active Range of Motion   Cervical/Thoracic Spine   Normal active range of motion  Cervical    Flexion: with pain  Extension: with pain    Strength/Myotome Testing     Left Shoulder     Planes of Motion   Flexion: 4+   Abduction: 4+   External rotation at 0°: 4+   Internal rotation at 0°: 5     Isolated Muscles   Biceps: 4+   Lower trapezius: 2+   Middle trapezius: 3     Right Shoulder     Planes of Motion   Flexion: 4+   Abduction: 4+   External rotation at 0°: 5   Internal rotation at 0°: 5     Isolated Muscles   Biceps: 5   Lower trapezius: 2+   Middle trapezius: 3     Left Elbow   Flexion: 4+  Extension: 5    Right Elbow   Flexion: 5  Extension: 4+    Left Wrist/Hand   Wrist extension: 5  Wrist flexion: 5    Right Wrist/Hand   Wrist extension:  5  Wrist flexion: 5    Tests   Cervical     Left   Negative active compression ([place]) and Spurling's sign.     Right   Negative active compression ([place]) and Spurling's sign.     Left Shoulder   Positive full can (weak and painful), Hawkin's and Neer's.     Right Shoulder   Negative full can, Hawkin's and Neer's.           Assessment & Plan     Assessment  Impairments: abnormal or restricted ROM, activity intolerance, impaired physical strength, lacks appropriate home exercise program and pain with function  Assessment details: Patient attended PT for one month with HEP direction with minimal improvements.  She may be able to achieve benefit with more long term compliance but is independent with her current program.  She has postural deficits and barriers including large breasts that will contribute to her symptoms.  At this point, she may transition to  HEP and assess benefits over the next 3-4 weeks before following up with referring provider.  Functional Limitations: carrying objects, lifting, pulling, pushing, uncomfortable because of pain, reaching behind back, reaching overhead and unable to perform repetitive tasks  Goals  Plan Goals: Short Term Goals 2-3 weeks  1. Patient to be compliant with initial HEP MET  2. Patient to demonstrate pain free cervical flexion. ONGOING      Long Term Goals 4-6 weeks MET LTG 1, others ONGOING.  1. Patient to be independent with HEP. MET  2. Patient to report tolerate bilateral upper trap palpation without extremity withdrawal  3. Patient to sleep for at least 6 hours without waking due to pain.  4. Patient to demonstrate minimal palpable tenderness along the cervical spine.  5. Patient to improve NDI score to at least 14% .        Plan  Therapy options: will be seen for skilled physical therapy services  Planned modality interventions: TENS, thermotherapy (hydrocollator packs), cryotherapy, ultrasound and dry needling  Planned therapy interventions: manual therapy,  strengthening, stretching, postural training, joint mobilization and neuromuscular re-education  Treatment plan discussed with: patient  Plan details: D/C to I HEP.        Timed:  Manual Therapy:   10     mins  33884;  Therapeutic Exercise:    20     mins  32105;     Neuromuscular Lucy:    0    mins  83346;    Therapeutic Activity:     10     mins  85067;     Gait Trainin     mins  19502;     Ultrasound:     0     mins  15579;    Electrical Stimulation:    0     mins  34337 ( );    Untimed:  Electrical Stimulation:    0     mins  56814 ( );  Mechanical Traction:    0     mins  17318;     Timed Treatment:   40   mins   Total Treatment:     40   mins    PT SIGNATURE: Mitch Buck, PT   DATE TREATMENT INITIATED: 2021

## 2021-06-29 NOTE — TELEPHONE ENCOUNTER
Caller: Mallika Brewster    Relationship: Self    Best call back number: [phone]    What is the medical concern/diagnosis: BREAST REDUCTION     What specialty or service is being requested: PLASTIC SURGERY     What is the provider, practice or medical service name: [facility]     What is the office location:      What is the office phone number: [phone]    Any additional details: PATIENT STATES SHE IS SCHEDULED TO HAVE HER MAMMOGRAM TOMORROW AND WILL NEED PCP TO NOW REFER HER TO [facility] FOR HER BREAST REDUCTION.

## 2021-06-30 ENCOUNTER — HOSPITAL ENCOUNTER (OUTPATIENT)
Dept: MAMMOGRAPHY | Facility: HOSPITAL | Age: 56
Discharge: HOME | End: 2021-06-30
Admitting: NURSE PRACTITIONER

## 2021-06-30 DIAGNOSIS — Z12.31 VISIT FOR SCREENING MAMMOGRAM: ICD-10-CM

## 2021-06-30 PROCEDURE — 77063 BREAST TOMOSYNTHESIS BI: CPT

## 2021-06-30 PROCEDURE — 77063 BREAST TOMOSYNTHESIS BI: CPT | Performed by: RADIOLOGY

## 2021-06-30 PROCEDURE — 77067 SCR MAMMO BI INCL CAD: CPT

## 2021-06-30 PROCEDURE — 77067 SCR MAMMO BI INCL CAD: CPT | Performed by: RADIOLOGY

## 2021-07-07 ENCOUNTER — TELEPHONE (OUTPATIENT)
Dept: INTERNAL MEDICINE | Facility: CLINIC | Age: 56
End: 2021-07-07

## 2021-07-07 NOTE — TELEPHONE ENCOUNTER
Patient's mammogram is done and needs to be sent over to [facility] for her consult on breast reduction. If you have fax number, ralph can send it for you. Thanks!!

## 2021-07-13 DIAGNOSIS — E11.9 TYPE 2 DIABETES MELLITUS WITHOUT COMPLICATION, WITH LONG-TERM CURRENT USE OF INSULIN (HCC): ICD-10-CM

## 2021-07-13 DIAGNOSIS — Z79.4 TYPE 2 DIABETES MELLITUS WITHOUT COMPLICATION, WITH LONG-TERM CURRENT USE OF INSULIN (HCC): ICD-10-CM

## 2021-07-13 RX ORDER — SITAGLIPTIN 50 MG/1
TABLET, FILM COATED ORAL
Qty: 90 TABLET | Refills: 3 | Status: SHIPPED | OUTPATIENT
Start: 2021-07-13

## 2021-07-22 ENCOUNTER — TELEPHONE (OUTPATIENT)
Dept: INTERNAL MEDICINE | Facility: CLINIC | Age: 56
End: 2021-07-22

## 2021-07-22 NOTE — TELEPHONE ENCOUNTER
PT CALLED TO REQUEAT TO HAVE HER MAMMOGRAM AND PHYSICAL THERAPY RESULTS FAXED OVER TO [facility].    PLEASE ADVISE.  CALL BACK:[phone]  FAX:[phone]

## 2021-07-23 ENCOUNTER — TELEPHONE (OUTPATIENT)
Dept: INTERNAL MEDICINE | Facility: CLINIC | Age: 56
End: 2021-07-23

## 2021-07-27 ENCOUNTER — TELEMEDICINE (OUTPATIENT)
Dept: PSYCHIATRY | Facility: CLINIC | Age: 56
End: 2021-07-27

## 2021-07-27 DIAGNOSIS — F43.10 POST TRAUMATIC STRESS DISORDER (PTSD): ICD-10-CM

## 2021-07-27 DIAGNOSIS — F51.5 NIGHTMARES: ICD-10-CM

## 2021-07-27 DIAGNOSIS — F41.1 GENERALIZED ANXIETY DISORDER: Primary | Chronic | ICD-10-CM

## 2021-07-27 DIAGNOSIS — F33.0 MILD EPISODE OF RECURRENT MAJOR DEPRESSIVE DISORDER (HCC): Chronic | ICD-10-CM

## 2021-07-27 DIAGNOSIS — F41.0 PANIC DISORDER (EPISODIC PAROXYSMAL ANXIETY): ICD-10-CM

## 2021-07-27 DIAGNOSIS — G47.9 SLEEPING DIFFICULTIES: ICD-10-CM

## 2021-07-27 PROCEDURE — 99214 OFFICE O/P EST MOD 30 MIN: CPT | Performed by: NURSE PRACTITIONER

## 2021-07-27 RX ORDER — FLUOXETINE HYDROCHLORIDE 40 MG/1
80 CAPSULE ORAL DAILY
Qty: 180 CAPSULE | Refills: 0 | Status: SHIPPED | OUTPATIENT
Start: 2021-07-27 | End: 2022-01-24 | Stop reason: SDUPTHER

## 2021-07-27 RX ORDER — QUETIAPINE FUMARATE 25 MG/1
25-50 TABLET, FILM COATED ORAL NIGHTLY PRN
Qty: 180 TABLET | Refills: 0 | Status: SHIPPED | OUTPATIENT
Start: 2021-07-27 | End: 2021-11-29 | Stop reason: SDUPTHER

## 2021-07-27 RX ORDER — BUSPIRONE HYDROCHLORIDE 10 MG/1
10 TABLET ORAL 3 TIMES DAILY
Qty: 270 TABLET | Refills: 0 | Status: SHIPPED | OUTPATIENT
Start: 2021-07-27 | End: 2021-09-30 | Stop reason: SDUPTHER

## 2021-07-27 RX ORDER — PRAZOSIN HYDROCHLORIDE 1 MG/1
1 CAPSULE ORAL NIGHTLY
Qty: 90 CAPSULE | Refills: 0 | Status: SHIPPED | OUTPATIENT
Start: 2021-07-27 | End: 2021-09-30 | Stop reason: SDUPTHER

## 2021-07-27 NOTE — PROGRESS NOTES
"This provider is located at the [facility] (formerly [facility]) (through [facility]), [address], [place] [facility] [place], [address] using a secure [employer] Video Visit through [employer]. Patient is being seen remotely via telehealth at their home address in [place], and stated they are in a secure environment for this session. The patient's condition being diagnosed/treated is appropriate for telemedicine. The provider identified herself as well as her credentials. The patient, and/or patients guardian, consent to be seen remotely, and when consent is given they understand that the consent allows for patient identifiable information to be sent to a third party as needed. They may refuse to be seen remotely at any time. The electronic data is encrypted and password protected, and the patient and/or guardian has been advised of the potential risks to privacy not withstanding such measures.    You have chosen to receive care through a telehealth visit.  Do you consent to use a video/audio connection for your medical care today? Yes     Subjective   Mallika Brewster is a 56 y.o. female who is here today for medication management follow up.    Chief Complaint: Depression, anxiety, sleeping difficulties    History of Present Illness: The patient describes her mood as \"okay\" over the last few weeks.  Patient endorses that overall things been okay for the past few weeks.  Patient endorses that she has noticed some exacerbations of depression and anxiety.  Patient states that over the past few weeks she is noticed herself feeling down and sad more often.  The patient reports her appetite as good.  The patient reports her sleep as fluctuating.  Patient states that she has had a recent increase in nightmares.  Patient states that approximately 2-3 times per week she will have a recurring dream of being very stressed and overwhelmed in the workplace.  Patient states that she will awaken feeling very fatigued and tired " as she does not feel that she rested well.  Patient states she also recently had a nightmare that her  was going to rape her and states she awakened feeling very panicked.  The patient denies any new medical problems or changes in medications since last appointment with this facility.  The patient reports compliance with current medication regimen.  The patient denies any current side effects from her current medication regimen.  The patient denies any abnormal muscle movements or tics.  The patient rates her depression at a 5-6/10 on a 0-10 scale, with 10 being the worst.  The patient rates her anxiety at a 5-6/10 on a 0-10 scale, with 10 being the worst.  The patient rates hopelessness at a 0/10 on a 0-10 scale with 10 being the worst.  The patient denies suicidal ideations and homicidal ideations, and is convincing.  The patient denies any auditory hallucinations or visual hallucinations.  The patient does not endorse significant symptoms consistent with bipolar disorder or a psychotic illness.                    The patient denies any chance of pregnancy at this time.  The patient was educated that her prescribed medications can have potential risk to a developing fetus. The patient is advised to contact this APRN/this office if she becomes pregnant or plans to become pregnant.  Pt verbalizes understanding and acknowledged agreement with this plan in her own words.          The following portions of the patient's history were reviewed and updated as appropriate: allergies, current medications, past family history, past medical history, past social history, past surgical history and problem list.    Review of Systems   Constitutional: Positive for fatigue. Negative for activity change, appetite change and unexpected weight change.   Psychiatric/Behavioral: Positive for dysphoric mood and sleep disturbance. Negative for agitation, behavioral problems, confusion, decreased concentration, hallucinations,  self-injury and suicidal ideas. The patient is nervous/anxious. The patient is not hyperactive.        Objective   Physical Exam  Constitutional:       Appearance: Normal appearance.   Neurological:      Mental Status: She is alert.   Psychiatric:         Attention and Perception: Attention and perception normal.         Mood and Affect: Affect normal. Mood is anxious and depressed.         Speech: Speech normal.         Behavior: Behavior normal. Behavior is cooperative.         Thought Content: Thought content normal. Thought content does not include homicidal or suicidal ideation. Thought content does not include homicidal or suicidal plan.         Cognition and Memory: Cognition and memory normal.         Judgment: Judgment normal.       not currently breastfeeding.    The patient was seen remotely today via a MyChart Video Visit through [facility].  Unable to obtain vital signs due to nature of remote visit.  Height stated at 60 inches.  Weight stated at 147 pounds.     No Known Allergies    No results found for this or any previous visit (from the past 2016 hour(s)).    Current Medications:   Current Outpatient Medications   Medication Sig Dispense Refill   • busPIRone (BUSPAR) 10 MG tablet Take 1 tablet by mouth 3 (Three) Times a Day. 270 tablet 0   • famotidine (PEPCID) 40 MG tablet TAKE 1 TABLET TWICE A DAY AS NEEDED FOR HEARTBURN 60 tablet 11   • FLUoxetine (PROzac) 40 MG capsule Take 2 capsules by mouth Daily. 180 capsule 0   • glucose blood (Glucose Meter Test) test strip Use as instructed to check blood glucose levels 3 times daily 100 each 5   • glucose monitor monitoring kit 1 each As Needed (Check blood sugars 3 times daily PRN.). 1 each 0   • Januvia 50 MG tablet TAKE 1 TABLET DAILY 90 tablet 3   • Lancets misc Use as instructed to test blood glucose levels 3 times daily. 100 each 5   • linaclotide (LINZESS) 145 MCG capsule capsule Take 1 capsule by mouth Daily. 90 capsule 3   • losartan (COZAAR) 25 MG  tablet Take 1 tablet by mouth Daily.     • meloxicam (MOBIC) 15 MG tablet      • metFORMIN (GLUCOPHAGE) 1000 MG tablet      • methocarbamol (ROBAXIN) 750 MG tablet      • metoclopramide (Reglan) 10 MG tablet Take 1 tablet by mouth 4 (Four) Times a Day Before Meals & at Bedtime As Needed (heartburn). 60 tablet 0   • montelukast (SINGULAIR) 10 MG tablet Take 1 tablet by mouth Daily. 90 tablet 3   • nabumetone (RELAFEN) 500 MG tablet      • propranolol (INDERAL) 20 MG tablet Take 1/2 to 1 tablet by mouth 3 times a day as needed for anxiety. 60 tablet 2   • QUEtiapine (SEROquel) 25 MG tablet Take 1-2 tablets by mouth At Night As Needed (sleep). 180 tablet 0   • RABEprazole (ACIPHEX) 20 MG EC tablet Take 1 tablet by mouth 2 (Two) Times a Day. 90 tablet 3   • simvastatin (ZOCOR) 20 MG tablet Take 1 tablet by mouth Daily.     • sucralfate (CARAFATE) 1 g tablet Take 4 tablets by mouth Daily.     • prazosin (MINIPRESS) 1 MG capsule Take 1 capsule by mouth Every Night. 90 capsule 0     No current facility-administered medications for this visit.       Mental Status Exam:   Hygiene:   good  Cooperation:  Cooperative  Eye Contact:  Good  Psychomotor Behavior:  Appropriate  Affect:  Full range  Hopelessness: Denies  Speech:  Normal  Thought Process:  Goal directed  Thought Content:  Normal  Suicidal:  None  Homicidal:  None  Hallucinations:  None  Delusion:  None  Memory:  Intact  Orientation:  Person, Place, Time and Situation  Reliability:  good  Insight:  Good  Judgement:  Good  Impulse Control:  Good  Physical/Medical Issues:  Yes See medical history      Assessment/Plan   Diagnoses and all orders for this visit:    1. Generalized anxiety disorder (Primary)  -     busPIRone (BUSPAR) 10 MG tablet; Take 1 tablet by mouth 3 (Three) Times a Day.  Dispense: 270 tablet; Refill: 0  -     FLUoxetine (PROzac) 40 MG capsule; Take 2 capsules by mouth Daily.  Dispense: 180 capsule; Refill: 0    2. Panic disorder (episodic paroxysmal  anxiety)  -     busPIRone (BUSPAR) 10 MG tablet; Take 1 tablet by mouth 3 (Three) Times a Day.  Dispense: 270 tablet; Refill: 0  -     FLUoxetine (PROzac) 40 MG capsule; Take 2 capsules by mouth Daily.  Dispense: 180 capsule; Refill: 0    3. Post traumatic stress disorder (PTSD)  -     prazosin (MINIPRESS) 1 MG capsule; Take 1 capsule by mouth Every Night.  Dispense: 90 capsule; Refill: 0    4. Mild episode of recurrent major depressive disorder (CMS/HCC)  -     FLUoxetine (PROzac) 40 MG capsule; Take 2 capsules by mouth Daily.  Dispense: 180 capsule; Refill: 0    5. Sleeping difficulties  -     QUEtiapine (SEROquel) 25 MG tablet; Take 1-2 tablets by mouth At Night As Needed (sleep).  Dispense: 180 tablet; Refill: 0    6. Nightmares  -     prazosin (MINIPRESS) 1 MG capsule; Take 1 capsule by mouth Every Night.  Dispense: 90 capsule; Refill: 0            Visit Diagnoses:    ICD-10-CM ICD-9-CM   1. Generalized anxiety disorder  F41.1 300.02   2. Panic disorder (episodic paroxysmal anxiety)  F41.0 300.01   3. Post traumatic stress disorder (PTSD)  F43.10 309.81   4. Mild episode of recurrent major depressive disorder (CMS/HCC)  F33.0 296.31   5. Sleeping difficulties  G47.9 780.50   6. Nightmares  F51.5 307.47       GOALS:  Short Term Goals: Patient will be compliant with medication, and patient will have no significant medication related side effects.  Patient will be engaged in psychotherapy as indicated.  Patient will report subjective improvement of symptoms.  Long term goals: To stabilize mood and treat/improve subjective symptoms, the patient will stay out of the hospital, the patient will be at an optimal level of functioning, and the patient will take all medications as prescribed.  The patient verbalized understanding and agreement with goals that were mutually set.      TREATMENT PLAN/GOALS: Continue medications and treatment plan as indicated. Treatment and medication options discussed during today's visit.  Patient acknowledged and verbally consented to continue with current treatment plan and was educated on the importance of compliance with treatment and follow-up appointments.      -Begin Prazosin 1 mg nightly for nightmares. Educated patient not to used Prazosin and Propanolol together due to potential hypotensive effects and patient verbalizes understanding.   -Increase Buspar to 10 mg TID   -Continue Prozac 80 mg daily  -Continue Seroquel 50 mg nightly as needed for sleep  -Continue Propanolol 10-20 mg three times daily as needed for anxiety  -Continue psychotherapy       MEDICATION ISSUES: Discussed medication options and treatment plan of prescribed medication, any off label use of medication, as well as the risks, benefits, any black box warnings including increased suicidality, and side effects including but not limited to potential falls, dizziness, possible impaired driving, GI side effects (change in appetite, abdominal discomfort, nausea, vomiting, diarrhea, and/or constipation), dry mouth, somnolence, sedation, insomnia, activation, agitation, irritation, tremors, abnormal muscle movements or disorders, tardive dyskinesia, akathisia, asthenia, headache, sweating, possible bruising or rare bleeding, electrolyte and/or fluid abnormalities, change in blood pressure/heart rate/and or heart rhythm, hypotension, sexual dysfunction, rare impulse control problems, rare seizures, rare neuroleptic malignant syndrome, increased risk of death and cerebrovascular events, change in blood glucose and increased risk for diabetes, change in triglycerides and cholesterol and increased risk for dyslipidemia,  weight gain, weight gain that can become problematic to health, skin conditions and reactions, and metabolic adversities among others. Patient and/or guardian are agreeable to call the office with any worsening of symptoms or onset of side effects, or if any concerns or questions arise.  The contact information for  the office is made available to the patient and/or guardian. Patient and/or guardian are agreeable to call 911 or go to the nearest ER should they begin having any SI/HI, or if any urgent concerns arise. No medication side effects or related complaints today.    This APRN has discussed with the patient/guardian about the possibility of serotonin syndrome when certain medications are taken together, as is the case with this patient.  This APRN has provided the patient/guardian with a list of symptoms of serotonin syndrome including symptoms of autonomic instability, altered sensorium, confusion, restlessness, agitation, myoclonus, hyperreflexia, hyperthermia, diaphoresis, tremor, chills, diarrhea and cramps, ataxia, headache, migraines, seizures, and insomnia; which could lead to permanent hyperthermic brain damage, cardiovascular collapse, coma, or even death.  The patient/guardian are instructed to stop medications immediately and either contact this APRN/this office during regular office hours, or go to the emergency department/call 911, if they begin to experience any of the symptoms discussed.  The benefits and risks of the current medication regimen are discussed with the patient/guardian, and they feel that the benefits out weigh the risks.  The patient/guardian verbalized understanding and agreement in their own words.      MEDS ORDERED DURING VISIT:  New Medications Ordered This Visit   Medications   • busPIRone (BUSPAR) 10 MG tablet     Sig: Take 1 tablet by mouth 3 (Three) Times a Day.     Dispense:  270 tablet     Refill:  0   • FLUoxetine (PROzac) 40 MG capsule     Sig: Take 2 capsules by mouth Daily.     Dispense:  180 capsule     Refill:  0   • QUEtiapine (SEROquel) 25 MG tablet     Sig: Take 1-2 tablets by mouth At Night As Needed (sleep).     Dispense:  180 tablet     Refill:  0   • prazosin (MINIPRESS) 1 MG capsule     Sig: Take 1 capsule by mouth Every Night.     Dispense:  90 capsule     Refill:   0       Return in about 4 weeks (around 8/24/2021), or if symptoms worsen or fail to improve, for Recheck.        Patient will follow-up in 4 weeks, highly encouraged the patient if she had any questions or concerns to contact the behavioral health [facility] clinic for sooner appointment patient verbalized understanding.      Functional Status: Mild impairment     Prognosis: Fair with Ongoing Treatment             This document has been electronically signed by BHARAT Partida  July 27, 2021 08:40 EDT    Part of this note may be an electronic transcription/translation of spoken language to printed text using the Dragon Dictation System.

## 2021-08-03 ENCOUNTER — OFFICE VISIT (OUTPATIENT)
Dept: INTERNAL MEDICINE | Facility: CLINIC | Age: 56
End: 2021-08-03

## 2021-08-03 VITALS
HEART RATE: 92 BPM | HEIGHT: 60 IN | BODY MASS INDEX: 29.64 KG/M2 | SYSTOLIC BLOOD PRESSURE: 118 MMHG | OXYGEN SATURATION: 97 % | WEIGHT: 151 LBS | TEMPERATURE: 99.6 F | RESPIRATION RATE: 16 BRPM | DIASTOLIC BLOOD PRESSURE: 76 MMHG

## 2021-08-03 DIAGNOSIS — G89.29 CHRONIC BILATERAL THORACIC BACK PAIN: ICD-10-CM

## 2021-08-03 DIAGNOSIS — M54.6 CHRONIC BILATERAL THORACIC BACK PAIN: ICD-10-CM

## 2021-08-03 DIAGNOSIS — I10 ESSENTIAL HYPERTENSION: ICD-10-CM

## 2021-08-03 DIAGNOSIS — Z79.4 TYPE 2 DIABETES MELLITUS WITHOUT COMPLICATION, WITH LONG-TERM CURRENT USE OF INSULIN (HCC): Primary | ICD-10-CM

## 2021-08-03 DIAGNOSIS — E11.9 TYPE 2 DIABETES MELLITUS WITHOUT COMPLICATION, WITH LONG-TERM CURRENT USE OF INSULIN (HCC): Primary | ICD-10-CM

## 2021-08-03 DIAGNOSIS — L30.8 PSORIASIFORM ERUPTION: ICD-10-CM

## 2021-08-03 PROBLEM — N62 BREAST HYPERTROPHY IN FEMALE: Status: ACTIVE | Noted: 2021-07-23

## 2021-08-03 PROBLEM — G56.00 CARPAL TUNNEL SYNDROME: Status: ACTIVE | Noted: 2019-12-19

## 2021-08-03 LAB — HBA1C MFR BLD: 6.8 %

## 2021-08-03 PROCEDURE — 99214 OFFICE O/P EST MOD 30 MIN: CPT | Performed by: NURSE PRACTITIONER

## 2021-08-03 PROCEDURE — 83036 HEMOGLOBIN GLYCOSYLATED A1C: CPT | Performed by: NURSE PRACTITIONER

## 2021-08-03 RX ORDER — TRIAMCINOLONE ACETONIDE 40 MG/ML
40 INJECTION, SUSPENSION INTRA-ARTICULAR; INTRAMUSCULAR ONCE
Status: DISCONTINUED | OUTPATIENT
Start: 2021-08-03 | End: 2022-04-04

## 2021-08-03 RX ORDER — MELOXICAM 15 MG/1
15 TABLET ORAL DAILY
Qty: 90 TABLET | Refills: 1 | Status: SHIPPED | OUTPATIENT
Start: 2021-08-03 | End: 2022-02-03

## 2021-08-03 NOTE — PROGRESS NOTES
Subjective   Chief Complaint   Patient presents with   • Diabetes     f/u      Mallika Brewster is a 56 y.o. female here today for diabetes, hypertension, psoriasis, and back pain.  Blood sugar has been stable and doing well averaging around 120.  Today is 6.8.  Blood pressure stable and at goal averaging around 120/70.  No headaches, changes in vision, shortness of breath, or chest pain.  She has had recent flareup of psoriasis.  She has tried multiple creams with no improvement including Kenalog.  She has rash with itching on her arms and legs bilaterally.  She needs to have back pain that is exacerbated by weight of her large breasts.  She has upcoming appointment for consult for breast reduction.  Meloxicam helps with her pain but she is asking for a pain shot today.  She has taken NSAIDs today.      I have reviewed the following portions of the patient's history and confirmed they are accurate: allergies, current medications, past family history, past medical history, past social history, past surgical history and problem list    I have personally completed the patient's review of systems.    Review of Systems   Constitutional: Positive for fatigue. Negative for activity change, appetite change, chills, diaphoresis, fever, unexpected weight gain and unexpected weight loss.   HENT: Negative for congestion, ear discharge, ear pain, mouth sores, nosebleeds, sinus pressure, sneezing and sore throat.    Eyes: Negative for pain, discharge and itching.   Respiratory: Negative for cough, chest tightness, shortness of breath and wheezing.    Cardiovascular: Negative for chest pain, palpitations and leg swelling.   Gastrointestinal: Negative for abdominal pain, constipation, diarrhea, nausea, vomiting, GERD and indigestion.        Colonoscopy - 2017 - normal   Endocrine: Negative for heat intolerance, polydipsia, polyphagia and polyuria.   Genitourinary: Negative for dysuria, flank pain, frequency, hematuria and  urgency.   Musculoskeletal: Positive for arthralgias, back pain, myalgias, neck pain and neck stiffness. Negative for gait problem and joint swelling.   Skin: Positive for rash. Negative for color change and pallor.   Allergic/Immunologic: Negative for environmental allergies and immunocompromised state.   Neurological: Negative for seizures, speech difficulty, weakness and numbness.   Hematological: Negative for adenopathy.   Psychiatric/Behavioral: Positive for stress. Negative for agitation, decreased concentration, sleep disturbance, suicidal ideas and depressed mood. The patient is not nervous/anxious.        Current Outpatient Medications on File Prior to Visit   Medication Sig   • busPIRone (BUSPAR) 10 MG tablet Take 1 tablet by mouth 3 (Three) Times a Day.   • famotidine (PEPCID) 40 MG tablet TAKE 1 TABLET TWICE A DAY AS NEEDED FOR HEARTBURN   • FLUoxetine (PROzac) 40 MG capsule Take 2 capsules by mouth Daily.   • glucose blood (Glucose Meter Test) test strip Use as instructed to check blood glucose levels 3 times daily   • glucose monitor monitoring kit 1 each As Needed (Check blood sugars 3 times daily PRN.).   • Januvia 50 MG tablet TAKE 1 TABLET DAILY   • Lancets misc Use as instructed to test blood glucose levels 3 times daily.   • linaclotide (LINZESS) 145 MCG capsule capsule Take 1 capsule by mouth Daily.   • losartan (COZAAR) 25 MG tablet Take 1 tablet by mouth Daily.   • metFORMIN (GLUCOPHAGE) 1000 MG tablet    • methocarbamol (ROBAXIN) 750 MG tablet    • metoclopramide (Reglan) 10 MG tablet Take 1 tablet by mouth 4 (Four) Times a Day Before Meals & at Bedtime As Needed (heartburn).   • montelukast (SINGULAIR) 10 MG tablet Take 1 tablet by mouth Daily.   • nabumetone (RELAFEN) 500 MG tablet    • prazosin (MINIPRESS) 1 MG capsule Take 1 capsule by mouth Every Night.   • propranolol (INDERAL) 20 MG tablet Take 1/2 to 1 tablet by mouth 3 times a day as needed for anxiety.   • QUEtiapine (SEROquel) 25  "MG tablet Take 1-2 tablets by mouth At Night As Needed (sleep).   • RABEprazole (ACIPHEX) 20 MG EC tablet Take 1 tablet by mouth 2 (Two) Times a Day.   • sucralfate (CARAFATE) 1 g tablet Take 4 tablets by mouth Daily.     No current facility-administered medications on file prior to visit.       Objective   Vitals:    08/03/21 1044   BP: 118/76   Pulse: 92   Resp: 16   Temp: 99.6 °F (37.6 °C)   TempSrc: Temporal   SpO2: 97%   Weight: 68.5 kg (151 lb)   Height: 152.4 cm (60\")     Body mass index is 29.49 kg/m².    Physical Exam  Vitals reviewed.   Constitutional:       Appearance: Normal appearance. She is well-developed.   HENT:      Head: Normocephalic and atraumatic.      Nose: Nose normal.   Eyes:      General: Lids are normal.      Conjunctiva/sclera: Conjunctivae normal.      Pupils: Pupils are equal, round, and reactive to light.   Neck:      Thyroid: No thyromegaly.      Trachea: Trachea normal.   Pulmonary:      Effort: Pulmonary effort is normal. No respiratory distress.   Musculoskeletal:      Cervical back: Spasms and tenderness present.      Thoracic back: Spasms and tenderness present.      Lumbar back: Spasms and tenderness present.   Skin:     General: Skin is warm and dry.      Comments: Red inflamed patches on arms and legs bilaterally.   Neurological:      Mental Status: She is alert and oriented to person, place, and time.      GCS: GCS eye subscore is 4. GCS verbal subscore is 5. GCS motor subscore is 6.   Psychiatric:         Attention and Perception: Attention normal.         Mood and Affect: Affect normal. Mood is anxious.         Speech: Speech normal.         Behavior: Behavior normal. Behavior is cooperative.         Assessment/Plan   Problem List Items Addressed This Visit        Cardiac and Vasculature    Essential hypertension  Chronic issue stable requiring medication management and monitoring. Will eat well balanced heart healthy diet, drink adequate water, increase physical activity, " and get adequate rest. Monitor blood pressures daily and contact office for any readings consistently above 140/90. Patient will report any associated symptoms such as headaches, blurry vision, or nausea. Patient will go to ER for any chest pressure or chest pain.   Continue losartan and propranolol.       Endocrine and Metabolic    Type 2 diabetes mellitus without complication, with long-term current use of insulin (CMS/[facility]) - Primary  Chronic issue stable requiring medication management and monitoring. Will eat well balanced heart healthy diabetic diet, drink adequate water, increase physical activity, and get adequate rest. Will monitor blood sugars daily and keep log for next appt. Will contact office earlier if blood sugars are averaging above 250.   Continue Metformin and Januvia.      Relevant Orders    POCT glycated hemoglobin, total (Completed)      Other Visit Diagnoses     Psoriasiform eruption      Chronic issue unstable requiring medication management.  Will eat a well-balanced diet, increase water intake, and get adequate rest.  Discussed skin hygiene and importance of moisture. Suggested daily anti-histamine such as claritin or zyrtec.   Getting Kenalog injection today.  Start betamethasone ointment.    Relevant Medications    triamcinolone acetonide (KENALOG-40) injection 40 mg    betamethasone valerate (VALISONE) 0.1 % ointment    Chronic bilateral thoracic back pain      Chronic issue unstable requiring medication management and monitoring. Will eat well balanced diet, increase water intake, increase physical activity as tolerated, and get adequate rest. Can use warmth or ice for discomfort in this area. Discussed stretching exercises.   We will get Kenalog shot today.  Continue meloxicam daily.    Relevant Medications    meloxicam (MOBIC) 15 MG tablet             Current Outpatient Medications:   •  busPIRone (BUSPAR) 10 MG tablet, Take 1 tablet by mouth 3 (Three) Times a Day., Disp: 270 tablet,  Rfl: 0  •  famotidine (PEPCID) 40 MG tablet, TAKE 1 TABLET TWICE A DAY AS NEEDED FOR HEARTBURN, Disp: 60 tablet, Rfl: 11  •  FLUoxetine (PROzac) 40 MG capsule, Take 2 capsules by mouth Daily., Disp: 180 capsule, Rfl: 0  •  glucose blood (Glucose Meter Test) test strip, Use as instructed to check blood glucose levels 3 times daily, Disp: 100 each, Rfl: 5  •  glucose monitor monitoring kit, 1 each As Needed (Check blood sugars 3 times daily PRN.)., Disp: 1 each, Rfl: 0  •  Januvia 50 MG tablet, TAKE 1 TABLET DAILY, Disp: 90 tablet, Rfl: 3  •  Lancets misc, Use as instructed to test blood glucose levels 3 times daily., Disp: 100 each, Rfl: 5  •  linaclotide (LINZESS) 145 MCG capsule capsule, Take 1 capsule by mouth Daily., Disp: 90 capsule, Rfl: 3  •  losartan (COZAAR) 25 MG tablet, Take 1 tablet by mouth Daily., Disp: , Rfl:   •  meloxicam (MOBIC) 15 MG tablet, Take 1 tablet by mouth Daily., Disp: 90 tablet, Rfl: 1  •  metFORMIN (GLUCOPHAGE) 1000 MG tablet, , Disp: , Rfl:   •  methocarbamol (ROBAXIN) 750 MG tablet, , Disp: , Rfl:   •  metoclopramide (Reglan) 10 MG tablet, Take 1 tablet by mouth 4 (Four) Times a Day Before Meals & at Bedtime As Needed (heartburn)., Disp: 60 tablet, Rfl: 0  •  montelukast (SINGULAIR) 10 MG tablet, Take 1 tablet by mouth Daily., Disp: 90 tablet, Rfl: 3  •  nabumetone (RELAFEN) 500 MG tablet, , Disp: , Rfl:   •  prazosin (MINIPRESS) 1 MG capsule, Take 1 capsule by mouth Every Night., Disp: 90 capsule, Rfl: 0  •  propranolol (INDERAL) 20 MG tablet, Take 1/2 to 1 tablet by mouth 3 times a day as needed for anxiety., Disp: 60 tablet, Rfl: 2  •  QUEtiapine (SEROquel) 25 MG tablet, Take 1-2 tablets by mouth At Night As Needed (sleep)., Disp: 180 tablet, Rfl: 0  •  RABEprazole (ACIPHEX) 20 MG EC tablet, Take 1 tablet by mouth 2 (Two) Times a Day., Disp: 90 tablet, Rfl: 3  •  sucralfate (CARAFATE) 1 g tablet, Take 4 tablets by mouth Daily., Disp: , Rfl:   •  betamethasone valerate (VALISONE) 0.1  % ointment, Apply  topically to the appropriate area as directed 2 (Two) Times a Day., Disp: 45 g, Rfl: 2  •  simvastatin (ZOCOR) 20 MG tablet, Take 1 tablet by mouth Daily., Disp: 90 tablet, Rfl: 1    Current Facility-Administered Medications:   •  triamcinolone acetonide (KENALOG-40) injection 40 mg, 40 mg, Intramuscular, Once, Amanda Katz APRN       Plan of care reviewed with the patient at the conclusion of today's visit.  Education was provided regarding diagnosis, management, and any prescribed or recommended OTC medications.  Patient verbalized understanding of and agreement with management plan.     Return in about 3 months (around 11/3/2021), or if symptoms worsen or fail to improve.      BHARAT Alonzo    Please note that portions of this note were completed with a voice recognition program. Efforts were made to edit the dictations, but occasionally words are mistranscribed.

## 2021-08-06 DIAGNOSIS — E78.5 HYPERLIPIDEMIA, UNSPECIFIED HYPERLIPIDEMIA TYPE: ICD-10-CM

## 2021-08-06 RX ORDER — SIMVASTATIN 20 MG
20 TABLET ORAL DAILY
Qty: 90 TABLET | Refills: 1 | Status: SHIPPED | OUTPATIENT
Start: 2021-08-06 | End: 2022-02-02

## 2021-08-06 NOTE — TELEPHONE ENCOUNTER
Caller: Mallika Brewster    Relationship: Self    Best call back number: [phone]  Medication needed:   Requested Prescriptions     Pending Prescriptions Disp Refills   • simvastatin (ZOCOR) 20 MG tablet       Sig: Take 1 tablet by mouth Daily.       When do you need the refill by: 08/10/21    What additional details did the patient provide when requesting the medication: PATIENT WAS SEEN 08/03/21    Does the patient have less than a 3 day supply:  [] Yes  [x] No    What is the patient's preferred pharmacy:      [facility] - [address] [phone] [facility] [phone]

## 2021-08-26 ENCOUNTER — TELEMEDICINE (OUTPATIENT)
Dept: PSYCHIATRY | Facility: CLINIC | Age: 56
End: 2021-08-26

## 2021-08-26 DIAGNOSIS — F33.0 MILD EPISODE OF RECURRENT MAJOR DEPRESSIVE DISORDER (HCC): Chronic | ICD-10-CM

## 2021-08-26 DIAGNOSIS — F51.5 NIGHTMARES: ICD-10-CM

## 2021-08-26 DIAGNOSIS — F43.10 POST TRAUMATIC STRESS DISORDER (PTSD): ICD-10-CM

## 2021-08-26 DIAGNOSIS — F41.0 PANIC DISORDER (EPISODIC PAROXYSMAL ANXIETY): ICD-10-CM

## 2021-08-26 DIAGNOSIS — G47.9 SLEEPING DIFFICULTIES: ICD-10-CM

## 2021-08-26 DIAGNOSIS — F41.1 GENERALIZED ANXIETY DISORDER: Primary | Chronic | ICD-10-CM

## 2021-08-26 DIAGNOSIS — E11.9 TYPE 2 DIABETES MELLITUS WITHOUT COMPLICATION, WITHOUT LONG-TERM CURRENT USE OF INSULIN (HCC): ICD-10-CM

## 2021-08-26 PROCEDURE — 99214 OFFICE O/P EST MOD 30 MIN: CPT | Performed by: NURSE PRACTITIONER

## 2021-08-26 NOTE — PROGRESS NOTES
"This provider is located at the [facility], [place] (through [facility]), [address], [place], [address] using a secure [employer] Video Visit through [employer]. Patient is being seen remotely via telehealth at their home address in [place], and stated they are in a secure environment for this session. The patient's condition being diagnosed/treated is appropriate for telemedicine. The provider identified herself as well as her credentials. The patient, and/or patients guardian, consent to be seen remotely, and when consent is given they understand that the consent allows for patient identifiable information to be sent to a third party as needed. They may refuse to be seen remotely at any time. The electronic data is encrypted and password protected, and the patient and/or guardian has been advised of the potential risks to privacy not withstanding such measures.    You have chosen to receive care through a telehealth visit.  Do you consent to use a video/audio connection for your medical care today? Yes     Subjective   Mallika Brewster is a 56 y.o. female who is here today for medication management follow up.    Chief Complaint: Depression, anxiety, sleeping difficulties    History of Present Illness: The patient describes her mood as \"better\" over the last few weeks.  Patient states that she is noticed an improvement in sleep and anxiety since last visit.  Patient states that since increasing the BuSpar she is noticed that she is less anxious and can relax easier.  The patient reports her appetite as good.  The patient reports her sleep as good.  Patient states that sleeping has been better over the past month. Patient states that nightmares have significantly decreased since beginning the Prazosin. Patient states that she was having multiple nightmares per week, but states that since beginning the prazosin she is only having 1-2 nightmares per month now.  Patient states that " this has resulted in a significant improvement in sleep.  The patient denies any new medical problems or changes in medications since last appointment with this facility.  The patient reports compliance with current medication regimen.  The patient denies any current side effects from her current medication regimen.  The patient denies any abnormal muscle movements or tics.  The patient rates her depression at a 3-4/10 on a 0-10 scale, with 10 being the worst.  The patient rates her anxiety at a 4-5/10 on a 0-10 scale, with 10 being the worst.  The patient rates hopelessness at a 0/10 on a 0-10 scale with 10 being the worst.  The patient would like to not adjust or change her medications at this visit.  The patient denies suicidal ideations and homicidal ideations, and is convincing.  The patient denies any auditory hallucinations or visual hallucinations.  The patient does not endorse significant symptoms consistent with bipolar disorder or a psychotic illness.                The patient denies any chance of pregnancy at this time.  The patient was educated that her prescribed medications can have potential risk to a developing fetus. The patient is advised to contact this APRN/this office if she becomes pregnant or plans to become pregnant.  Pt verbalizes understanding and acknowledged agreement with this plan in her own words.        The following portions of the patient's history were reviewed and updated as appropriate: allergies, current medications, past family history, past medical history, past social history, past surgical history and problem list.    Review of Systems   Constitutional: Positive for fatigue. Negative for activity change, appetite change and unexpected weight change.   Psychiatric/Behavioral: Negative for agitation, behavioral problems, confusion, decreased concentration, dysphoric mood, hallucinations, self-injury, sleep disturbance and suicidal ideas. The patient is nervous/anxious. The  patient is not hyperactive.        Objective   Physical Exam  Constitutional:       Appearance: Normal appearance.   Neurological:      Mental Status: She is alert.   Psychiatric:         Attention and Perception: Attention and perception normal.         Mood and Affect: Affect normal. Mood is anxious.         Speech: Speech normal.         Behavior: Behavior normal. Behavior is cooperative.         Thought Content: Thought content normal. Thought content does not include homicidal or suicidal ideation. Thought content does not include homicidal or suicidal plan.         Cognition and Memory: Cognition and memory normal.         Judgment: Judgment normal.       not currently breastfeeding.    The patient was seen remotely today via a MyChart Video Visit through [facility].  Unable to obtain vital signs due to nature of remote visit.  Height stated at 60 inches.  Weight stated at 151 pounds.     No Known Allergies    Recent Results (from the past 2016 hour(s))   POCT glycated hemoglobin, total    Collection Time: 08/03/21 10:55 AM    Specimen: Urine   Result Value Ref Range    Hemoglobin A1C 6.8 %       Current Medications:   Current Outpatient Medications   Medication Sig Dispense Refill   • betamethasone valerate (VALISONE) 0.1 % ointment Apply  topically to the appropriate area as directed 2 (Two) Times a Day. 45 g 2   • busPIRone (BUSPAR) 10 MG tablet Take 1 tablet by mouth 3 (Three) Times a Day. 270 tablet 0   • famotidine (PEPCID) 40 MG tablet TAKE 1 TABLET TWICE A DAY AS NEEDED FOR HEARTBURN 60 tablet 11   • FLUoxetine (PROzac) 40 MG capsule Take 2 capsules by mouth Daily. 180 capsule 0   • glucose blood (Glucose Meter Test) test strip Use as instructed to check blood glucose levels 3 times daily 100 each 5   • glucose monitor monitoring kit 1 each As Needed (Check blood sugars 3 times daily PRN.). 1 each 0   • Januvia 50 MG tablet TAKE 1 TABLET DAILY 90 tablet 3   • Lancets misc Use as instructed to test blood  glucose levels 3 times daily. 100 each 5   • linaclotide (LINZESS) 145 MCG capsule capsule Take 1 capsule by mouth Daily. 90 capsule 3   • losartan (COZAAR) 25 MG tablet Take 1 tablet by mouth Daily.     • meloxicam (MOBIC) 15 MG tablet Take 1 tablet by mouth Daily. 90 tablet 1   • metFORMIN (GLUCOPHAGE) 1000 MG tablet      • methocarbamol (ROBAXIN) 750 MG tablet      • metoclopramide (Reglan) 10 MG tablet Take 1 tablet by mouth 4 (Four) Times a Day Before Meals & at Bedtime As Needed (heartburn). 60 tablet 0   • montelukast (SINGULAIR) 10 MG tablet Take 1 tablet by mouth Daily. 90 tablet 3   • nabumetone (RELAFEN) 500 MG tablet      • prazosin (MINIPRESS) 1 MG capsule Take 1 capsule by mouth Every Night. 90 capsule 0   • propranolol (INDERAL) 20 MG tablet Take 1/2 to 1 tablet by mouth 3 times a day as needed for anxiety. 60 tablet 2   • QUEtiapine (SEROquel) 25 MG tablet Take 1-2 tablets by mouth At Night As Needed (sleep). 180 tablet 0   • RABEprazole (ACIPHEX) 20 MG EC tablet Take 1 tablet by mouth 2 (Two) Times a Day. 90 tablet 3   • simvastatin (ZOCOR) 20 MG tablet Take 1 tablet by mouth Daily. 90 tablet 1   • sucralfate (CARAFATE) 1 g tablet Take 4 tablets by mouth Daily.       Current Facility-Administered Medications   Medication Dose Route Frequency Provider Last Rate Last Admin   • triamcinolone acetonide (KENALOG-40) injection 40 mg  40 mg Intramuscular Once Amanda Katz APRN           Mental Status Exam:   Hygiene:   good  Cooperation:  Cooperative  Eye Contact:  Good  Psychomotor Behavior:  Appropriate  Affect:  Full range  Hopelessness: Denies  Speech:  Normal  Thought Process:  Goal directed  Thought Content:  Normal  Suicidal:  None  Homicidal:  None  Hallucinations:  None  Delusion:  None  Memory:  Intact  Orientation:  Person, Place, Time and Situation  Reliability:  good  Insight:  Good  Judgement:  Good  Impulse Control:  Good  Physical/Medical Issues:  Yes See medical  history      Assessment/Plan   Diagnoses and all orders for this visit:    1. Generalized anxiety disorder (Primary)    2. Panic disorder (episodic paroxysmal anxiety)    3. Post traumatic stress disorder (PTSD)    4. Mild episode of recurrent major depressive disorder (CMS/HCC)    5. Sleeping difficulties    6. Nightmares            Visit Diagnoses:    ICD-10-CM ICD-9-CM   1. Generalized anxiety disorder  F41.1 300.02   2. Panic disorder (episodic paroxysmal anxiety)  F41.0 300.01   3. Post traumatic stress disorder (PTSD)  F43.10 309.81   4. Mild episode of recurrent major depressive disorder (CMS/HCC)  F33.0 296.31   5. Sleeping difficulties  G47.9 780.50   6. Nightmares  F51.5 307.47       GOALS:  Short Term Goals: Patient will be compliant with medication, and patient will have no significant medication related side effects.  Patient will be engaged in psychotherapy as indicated.  Patient will report subjective improvement of symptoms.  Long term goals: To stabilize mood and treat/improve subjective symptoms, the patient will stay out of the hospital, the patient will be at an optimal level of functioning, and the patient will take all medications as prescribed.  The patient verbalized understanding and agreement with goals that were mutually set.      SUICIDE RISK ASSESSMENT: Unalterable demographics and a history of mental health intervention indicate this patient is in a high risk category compared to the general population. At present, the patient denies active SI/HI, intentions, or plans at this time and agrees to seek immediate care should such thoughts develop. The patient verbalizes understanding of how to access emergency care if needed and agrees to do so. Consideration of suicide risk and protective factors such as history, current presentation, individual strengths and weaknesses, psychosocial and environmental stressors and variables, psychiatric illness and symptoms, medical conditions and pain,  took place in this interview. Based on those considerations, the patient is determined: within individual baseline and presenting no imminent risk for suicide or homicide. Other recommendations: The patient does not meet the criteria for inpatient admission and is not a safety risk to self or others at today's visit. Inpatient treatment offers no significant advantages over outpatient treatment for this patient at today's visit.      SAFETY PLAN:  Patient was given ample time for questions and fully participated in treatment planning.  Patient was encouraged to call the clinic with any questions or concerns.  Patient was informed of access to emergency care. If patient were to develop any significant symptomatology, suicidal ideation, homicidal ideation, any concerns, or feel unsafe at any time they are to call the clinic and if unable to get immediate assistance should immediately call 911 or go to the nearest emergency room.  The patient is advised to remove or secure (lock away) all lethal weapons (including guns) and sharps (including razors, scissors, knives, etc.).  All medications (including any prescribed and any over the counter medications) should be stored in a safe and secured location that is not obtainable by children/adolescents.  Patient was given an opportunity and encouraged to ask questions about their medication, illness, and treatment. Patient contracted verbally for the following: If you are experiencing an emotional crisis or have thoughts of harming yourself or others, please go to your nearest local emergency room or call 911. Will continue to re-assess medication response and side effects frequently to establish efficacy and ensure safety. Risks, any black box warnings, side effects, off label usage, and benefits of medication and treatment discussed with patient, along with potential adverse side effects of current and/or newly prescribed medication, alternative treatment options, and OTC  medications.  Patient verbalized understanding of potential risks, any off label use of medication, any black box warnings, and any side effects in their own words. The patient verbalized understanding and agreed to comply with the safety plan discussed in their own words.  Patient given the number to the office. Number also available to the 24- hour suicide hotline.      TREATMENT PLAN/GOALS: Continue medications and treatment plan as indicated. Treatment and medication options discussed during today's visit. Patient acknowledged and verbally consented to continue with current treatment plan and was educated on the importance of compliance with treatment and follow-up appointments.      -Continue prazosin 1 mg nightly for nightmares  -Continue Buspar 10 mg TID   -Continue Prozac 80 mg daily  -Continue Seroquel 25-50 mg nightly as needed for sleep  -Continue Propanolol 10-20 mg three times daily as needed for anxiety  -90-day supply of medication sent in at last visit.  Refills not needed at this time.  Will refill medications when appropriate.  -Continue psychotherapy       MEDICATION ISSUES: Discussed medication options and treatment plan of prescribed medication, any off label use of medication, as well as the risks, benefits, any black box warnings including increased suicidality, and side effects including but not limited to potential falls, dizziness, possible impaired driving, GI side effects (change in appetite, abdominal discomfort, nausea, vomiting, diarrhea, and/or constipation), dry mouth, somnolence, sedation, insomnia, activation, agitation, irritation, tremors, abnormal muscle movements or disorders, tardive dyskinesia, akathisia, asthenia, headache, sweating, possible bruising or rare bleeding, electrolyte and/or fluid abnormalities, change in blood pressure/heart rate/and or heart rhythm, hypotension, sexual dysfunction, rare impulse control problems, rare seizures, rare neuroleptic malignant  syndrome, increased risk of death and cerebrovascular events, change in blood glucose and increased risk for diabetes, change in triglycerides and cholesterol and increased risk for dyslipidemia,  weight gain, weight gain that can become problematic to health, skin conditions and reactions, and metabolic adversities among others. Patient and/or guardian are agreeable to call the office with any worsening of symptoms or onset of side effects, or if any concerns or questions arise.  The contact information for the office is made available to the patient and/or guardian. Patient and/or guardian are agreeable to call 911 or go to the nearest ER should they begin having any SI/HI, or if any urgent concerns arise. No medication side effects or related complaints today.    This APRN has discussed with the patient/guardian about the possibility of serotonin syndrome when certain medications are taken together, as is the case with this patient.  This APRN has provided the patient/guardian with a list of symptoms of serotonin syndrome including symptoms of autonomic instability, altered sensorium, confusion, restlessness, agitation, myoclonus, hyperreflexia, hyperthermia, diaphoresis, tremor, chills, diarrhea and cramps, ataxia, headache, migraines, seizures, and insomnia; which could lead to permanent hyperthermic brain damage, cardiovascular collapse, coma, or even death.  The patient/guardian are instructed to stop medications immediately and either contact this APRN/this office during regular office hours, or go to the emergency department/call 911, if they begin to experience any of the symptoms discussed.  The benefits and risks of the current medication regimen are discussed with the patient/guardian, and they feel that the benefits out weigh the risks.  The patient/guardian verbalized understanding and agreement in their own words.      MEDS ORDERED DURING VISIT:  No orders of the defined types were placed in this  encounter.      Return in about 5 weeks (around 9/30/2021), or if symptoms worsen or fail to improve, for Recheck.        Patient will follow-up in 5 weeks, highly encouraged the patient if she had any questions or concerns to contact the behavioral health [facility] clinic for sooner appointment patient verbalized understanding.      Functional Status: Mild impairment     Prognosis: Fair with Ongoing Treatment             This document has been electronically signed by BHARAT Partida  August 26, 2021 08:09 EDT    Part of this note may be an electronic transcription/translation of spoken language to printed text using the Dragon Dictation System.

## 2021-08-26 NOTE — TELEPHONE ENCOUNTER
Caller: Mallika Brewster    Relationship: Self    Best call back number: [phone]    Medication needed:   Requested Prescriptions     Pending Prescriptions Disp Refills   • metFORMIN (GLUCOPHAGE) 1000 MG tablet         When do you need the refill by: 8/26/21    What additional details did the patient provide when requesting the medication: PATIENT IS OUT OF MEDICATION    Does the patient have less than a 3 day supply:  [x] Yes  [] No    What is the patient's preferred pharmacy: [facility] - [address] [phone] [facility] [phone]

## 2021-09-30 ENCOUNTER — TELEMEDICINE (OUTPATIENT)
Dept: PSYCHIATRY | Facility: CLINIC | Age: 56
End: 2021-09-30

## 2021-09-30 DIAGNOSIS — F41.0 PANIC DISORDER (EPISODIC PAROXYSMAL ANXIETY): ICD-10-CM

## 2021-09-30 DIAGNOSIS — F43.10 POST TRAUMATIC STRESS DISORDER (PTSD): ICD-10-CM

## 2021-09-30 DIAGNOSIS — F33.0 MILD EPISODE OF RECURRENT MAJOR DEPRESSIVE DISORDER (HCC): Chronic | ICD-10-CM

## 2021-09-30 DIAGNOSIS — F41.1 GENERALIZED ANXIETY DISORDER: Primary | Chronic | ICD-10-CM

## 2021-09-30 DIAGNOSIS — G47.9 SLEEPING DIFFICULTIES: ICD-10-CM

## 2021-09-30 DIAGNOSIS — F51.5 NIGHTMARES: ICD-10-CM

## 2021-09-30 PROCEDURE — 99214 OFFICE O/P EST MOD 30 MIN: CPT | Performed by: NURSE PRACTITIONER

## 2021-09-30 RX ORDER — PRAZOSIN HYDROCHLORIDE 1 MG/1
1 CAPSULE ORAL NIGHTLY
Qty: 90 CAPSULE | Refills: 0 | Status: SHIPPED | OUTPATIENT
Start: 2021-09-30 | End: 2022-01-24 | Stop reason: SDUPTHER

## 2021-09-30 RX ORDER — BUSPIRONE HYDROCHLORIDE 10 MG/1
10 TABLET ORAL 3 TIMES DAILY
Qty: 270 TABLET | Refills: 0 | Status: SHIPPED | OUTPATIENT
Start: 2021-09-30 | End: 2021-11-29 | Stop reason: SDUPTHER

## 2021-09-30 NOTE — PROGRESS NOTES
"This provider is located at the [facility] (through [facility]), [address], [place], [address] using a secure [employer] Video Visit through [employer]. Patient is being seen remotely via telehealth at their home address in [place], and stated they are in a secure environment for this session. The patient's condition being diagnosed/treated is appropriate for telemedicine. The provider identified herself as well as her credentials. The patient, and/or patients guardian, consent to be seen remotely, and when consent is given they understand that the consent allows for patient identifiable information to be sent to a third party as needed. They may refuse to be seen remotely at any time. The electronic data is encrypted and password protected, and the patient and/or guardian has been advised of the potential risks to privacy not withstanding such measures.    You have chosen to receive care through a telehealth visit.  Do you consent to use a video/audio connection for your medical care today? Yes     Subjective   Mallika Brewster is a 56 y.o. female who is here today for medication management follow up.    Chief Complaint: Depression, anxiety, sleeping difficulties    History of Present Illness:  The patient describes her mood as \"alright\" over the last few weeks.      The patient reports her appetite as good.  The patient reports her sleep as \"okay\".  Patient states that has been having nightmare approximately once per week.  Patient states that this has been going on for a few months now.  Patient states that she is unsure if her nightmares are worsening or becoming more frequent.  Patient states that overall her sleep has been \"okay\" and she is pleased with this.  Patient states that she does not think that she needs to adjust the medication for nightmares endorses that she is pleased with sleep.  Interview patient regarding current psychiatric medication regimen.  " Patient states that she has only been taking 40 mg of Prozac rather than 80 mg due to a misunderstanding.  Discussed with patient potentially increasing Prozac to 80 mg for further maintenance of depression and anxiety.  Patient states that she is currently pleased with her current mention of symptoms and psychiatric medication regimen.  Patient states that in the event of the depression and anxiety worsen to be willing to increase the medication, but endorses that for today she would prefer to leave it at the 40 mg daily.  The patient denies any new medical problems or changes in medications since last appointment with this facility.  The patient denies any current side effects from her current medication regimen.  The patient denies any abnormal muscle movements or tics.  The patient rates her depression at a 4-5/10 on a 0-10 scale, with 10 being the worst.  The patient rates her anxiety at a 4-5/10 on a 0-10 scale, with 10 being the worst.  The patient rates hopelessness at a 0/10 on a 0-10 scale with 10 being the worst.  The patient would like to not adjust or change her medications at this visit.  Patient endorses that she has been doing very well recently and is very pleased with this.  Patient states that she would prefer to schedule her visits every 2 months now rather than monthly.  Discussed with patient that since he has been doing well we can push visits out to 2 months, but to notify provider in the event that she needs in sooner and patient verbalizes understanding and is agreeable to this.  The patient denies suicidal ideations and homicidal ideations, and is convincing.  The patient denies any auditory hallucinations or visual hallucinations.  The patient does not endorse significant symptoms consistent with bipolar disorder or a psychotic illness.                    The patient denies any chance of pregnancy at this time.  The patient was educated that her prescribed medications can have potential  risk to a developing fetus. The patient is advised to contact this APRN/this office if she becomes pregnant or plans to become pregnant.  Pt verbalizes understanding and acknowledged agreement with this plan in her own words.        The following portions of the patient's history were reviewed and updated as appropriate: allergies, current medications, past family history, past medical history, past social history, past surgical history and problem list.    Review of Systems   Constitutional: Positive for fatigue. Negative for activity change, appetite change and unexpected weight change.   Psychiatric/Behavioral: Positive for dysphoric mood and sleep disturbance. Negative for agitation, behavioral problems, confusion, decreased concentration, hallucinations, self-injury and suicidal ideas. The patient is nervous/anxious. The patient is not hyperactive.        Objective   Physical Exam  Constitutional:       Appearance: Normal appearance.   Neurological:      Mental Status: She is alert.   Psychiatric:         Attention and Perception: Attention and perception normal.         Mood and Affect: Affect normal. Mood is anxious.         Speech: Speech normal.         Behavior: Behavior normal. Behavior is cooperative.         Thought Content: Thought content normal. Thought content does not include homicidal or suicidal ideation. Thought content does not include homicidal or suicidal plan.         Cognition and Memory: Cognition and memory normal.         Judgment: Judgment normal.       not currently breastfeeding.    The patient was seen remotely today via a MyChart Video Visit through [facility].  Unable to obtain vital signs due to nature of remote visit.  Height stated at 60 inches.  Weight stated at 151 pounds.     No Known Allergies    Recent Results (from the past 2016 hour(s))   POCT glycated hemoglobin, total    Collection Time: 08/03/21 10:55 AM    Specimen: Urine   Result Value Ref Range    Hemoglobin A1C 6.8 %        Current Medications:   Current Outpatient Medications   Medication Sig Dispense Refill   • betamethasone valerate (VALISONE) 0.1 % ointment Apply  topically to the appropriate area as directed 2 (Two) Times a Day. 45 g 2   • busPIRone (BUSPAR) 10 MG tablet Take 1 tablet by mouth 3 (Three) Times a Day. 270 tablet 0   • famotidine (PEPCID) 40 MG tablet TAKE 1 TABLET TWICE A DAY AS NEEDED FOR HEARTBURN 60 tablet 11   • FLUoxetine (PROzac) 40 MG capsule Take 2 capsules by mouth Daily. 180 capsule 0   • glucose blood (Glucose Meter Test) test strip Use as instructed to check blood glucose levels 3 times daily 100 each 5   • glucose monitor monitoring kit 1 each As Needed (Check blood sugars 3 times daily PRN.). 1 each 0   • Januvia 50 MG tablet TAKE 1 TABLET DAILY 90 tablet 3   • Lancets misc Use as instructed to test blood glucose levels 3 times daily. 100 each 5   • linaclotide (LINZESS) 145 MCG capsule capsule Take 1 capsule by mouth Daily. 90 capsule 3   • losartan (COZAAR) 25 MG tablet Take 1 tablet by mouth Daily.     • meloxicam (MOBIC) 15 MG tablet Take 1 tablet by mouth Daily. 90 tablet 1   • metFORMIN (GLUCOPHAGE) 1000 MG tablet Take 1 tablet by mouth 2 (Two) Times a Day With Meals. 60 tablet 3   • methocarbamol (ROBAXIN) 750 MG tablet      • metoclopramide (Reglan) 10 MG tablet Take 1 tablet by mouth 4 (Four) Times a Day Before Meals & at Bedtime As Needed (heartburn). 60 tablet 0   • montelukast (SINGULAIR) 10 MG tablet Take 1 tablet by mouth Daily. 90 tablet 3   • nabumetone (RELAFEN) 500 MG tablet      • prazosin (MINIPRESS) 1 MG capsule Take 1 capsule by mouth Every Night. 90 capsule 0   • propranolol (INDERAL) 20 MG tablet Take 1/2 to 1 tablet by mouth 3 times a day as needed for anxiety. 60 tablet 2   • QUEtiapine (SEROquel) 25 MG tablet Take 1-2 tablets by mouth At Night As Needed (sleep). 180 tablet 0   • RABEprazole (ACIPHEX) 20 MG EC tablet Take 1 tablet by mouth 2 (Two) Times a Day. 90 tablet  3   • simvastatin (ZOCOR) 20 MG tablet Take 1 tablet by mouth Daily. 90 tablet 1   • sucralfate (CARAFATE) 1 g tablet Take 4 tablets by mouth Daily.       Current Facility-Administered Medications   Medication Dose Route Frequency Provider Last Rate Last Admin   • triamcinolone acetonide (KENALOG-40) injection 40 mg  40 mg Intramuscular Once Ojeda Amanda Orourke APRN           Mental Status Exam:   Hygiene:   good  Cooperation:  Cooperative  Eye Contact:  Good  Psychomotor Behavior:  Appropriate  Affect:  Full range  Hopelessness: Denies  Speech:  Normal  Thought Process:  Goal directed  Thought Content:  Normal  Suicidal:  None  Homicidal:  None  Hallucinations:  None  Delusion:  None  Memory:  Intact  Orientation:  Person, Place, Time and Situation  Reliability:  good  Insight:  Good  Judgement:  Good  Impulse Control:  Good  Physical/Medical Issues:  Yes See medical history      Assessment/Plan   Diagnoses and all orders for this visit:    1. Generalized anxiety disorder (Primary)  -     busPIRone (BUSPAR) 10 MG tablet; Take 1 tablet by mouth 3 (Three) Times a Day.  Dispense: 270 tablet; Refill: 0    2. Panic disorder (episodic paroxysmal anxiety)  -     busPIRone (BUSPAR) 10 MG tablet; Take 1 tablet by mouth 3 (Three) Times a Day.  Dispense: 270 tablet; Refill: 0    3. Post traumatic stress disorder (PTSD)  -     prazosin (MINIPRESS) 1 MG capsule; Take 1 capsule by mouth Every Night.  Dispense: 90 capsule; Refill: 0    4. Mild episode of recurrent major depressive disorder (HCC)    5. Sleeping difficulties    6. Nightmares  -     prazosin (MINIPRESS) 1 MG capsule; Take 1 capsule by mouth Every Night.  Dispense: 90 capsule; Refill: 0            Visit Diagnoses:    ICD-10-CM ICD-9-CM   1. Generalized anxiety disorder  F41.1 300.02   2. Panic disorder (episodic paroxysmal anxiety)  F41.0 300.01   3. Post traumatic stress disorder (PTSD)  F43.10 309.81   4. Mild episode of recurrent major depressive disorder  ([facility])  F33.0 296.31   5. Sleeping difficulties  G47.9 780.50   6. Nightmares  F51.5 307.47       GOALS:  Short Term Goals: Patient will be compliant with medication, and patient will have no significant medication related side effects.  Patient will be engaged in psychotherapy as indicated.  Patient will report subjective improvement of symptoms.  Long term goals: To stabilize mood and treat/improve subjective symptoms, the patient will stay out of the hospital, the patient will be at an optimal level of functioning, and the patient will take all medications as prescribed.  The patient verbalized understanding and agreement with goals that were mutually set.      SUICIDE RISK ASSESSMENT: Unalterable demographics and a history of mental health intervention indicate this patient is in a high risk category compared to the general population. At present, the patient denies active SI/HI, intentions, or plans at this time and agrees to seek immediate care should such thoughts develop. The patient verbalizes understanding of how to access emergency care if needed and agrees to do so. Consideration of suicide risk and protective factors such as history, current presentation, individual strengths and weaknesses, psychosocial and environmental stressors and variables, psychiatric illness and symptoms, medical conditions and pain, took place in this interview. Based on those considerations, the patient is determined: within individual baseline and presenting no imminent risk for suicide or homicide. Other recommendations: The patient does not meet the criteria for inpatient admission and is not a safety risk to self or others at today's visit. Inpatient treatment offers no significant advantages over outpatient treatment for this patient at today's visit.      SAFETY PLAN:  Patient was given ample time for questions and fully participated in treatment planning.  Patient was encouraged to call the clinic with any questions or  concerns.  Patient was informed of access to emergency care. If patient were to develop any significant symptomatology, suicidal ideation, homicidal ideation, any concerns, or feel unsafe at any time they are to call the clinic and if unable to get immediate assistance should immediately call 911 or go to the nearest emergency room.  The patient is advised to remove or secure (lock away) all lethal weapons (including guns) and sharps (including razors, scissors, knives, etc.).  All medications (including any prescribed and any over the counter medications) should be stored in a safe and secured location that is not obtainable by children/adolescents.  Patient was given an opportunity and encouraged to ask questions about their medication, illness, and treatment. Patient contracted verbally for the following: If you are experiencing an emotional crisis or have thoughts of harming yourself or others, please go to your nearest local emergency room or call 911. Will continue to re-assess medication response and side effects frequently to establish efficacy and ensure safety. Risks, any black box warnings, side effects, off label usage, and benefits of medication and treatment discussed with patient, along with potential adverse side effects of current and/or newly prescribed medication, alternative treatment options, and OTC medications.  Patient verbalized understanding of potential risks, any off label use of medication, any black box warnings, and any side effects in their own words. The patient verbalized understanding and agreed to comply with the safety plan discussed in their own words.  Patient given the number to the office. Number also available to the 24- hour suicide hotline.      TREATMENT PLAN/GOALS: Continue medications and treatment plan as indicated. Treatment and medication options discussed during today's visit. Patient acknowledged and verbally consented to continue with current treatment plan and  was educated on the importance of compliance with treatment and follow-up appointments.        -Continue Prazosin 1 mg nightly for nightmares  -Continue Buspar 10 mg TID as adjunct for anxiety and depression  -Continue Prozac 40 mg daily for anxiety, depression, and PTSD  -Continue Seroquel 25-50 mg nightly as needed for sleeping difficulties  -Continue Propanolol 10-20 mg three times daily as needed for anxiety/panic  -Continue psychotherapy         MEDICATION ISSUES: Discussed medication options and treatment plan of prescribed medication, any off label use of medication, as well as the risks, benefits, any black box warnings including increased suicidality, and side effects including but not limited to potential falls, dizziness, possible impaired driving, GI side effects (change in appetite, abdominal discomfort, nausea, vomiting, diarrhea, and/or constipation), dry mouth, somnolence, sedation, insomnia, activation, agitation, irritation, tremors, abnormal muscle movements or disorders, tardive dyskinesia, akathisia, asthenia, headache, sweating, possible bruising or rare bleeding, electrolyte and/or fluid abnormalities, change in blood pressure/heart rate/and or heart rhythm, hypotension, sexual dysfunction, rare impulse control problems, rare seizures, rare neuroleptic malignant syndrome, increased risk of death and cerebrovascular events, change in blood glucose and increased risk for diabetes, change in triglycerides and cholesterol and increased risk for dyslipidemia,  weight gain, weight gain that can become problematic to health, skin conditions and reactions, and metabolic adversities among others. Patient and/or guardian are agreeable to call the office with any worsening of symptoms or onset of side effects, or if any concerns or questions arise.  The contact information for the office is made available to the patient and/or guardian. Patient and/or guardian are agreeable to call 911 or go to the  nearest ER should they begin having any SI/HI, or if any urgent concerns arise. No medication side effects or related complaints today.    This APRN has discussed with the patient/guardian about the possibility of serotonin syndrome when certain medications are taken together, as is the case with this patient.  This APRN has provided the patient/guardian with a list of symptoms of serotonin syndrome including symptoms of autonomic instability, altered sensorium, confusion, restlessness, agitation, myoclonus, hyperreflexia, hyperthermia, diaphoresis, tremor, chills, diarrhea and cramps, ataxia, headache, migraines, seizures, and insomnia; which could lead to permanent hyperthermic brain damage, cardiovascular collapse, coma, or even death.  The patient/guardian are instructed to stop medications immediately and either contact this APRN/this office during regular office hours, or go to the emergency department/call 911, if they begin to experience any of the symptoms discussed.  The benefits and risks of the current medication regimen are discussed with the patient/guardian, and they feel that the benefits out weigh the risks.  The patient/guardian verbalized understanding and agreement in their own words.      MEDS ORDERED DURING VISIT:  New Medications Ordered This Visit   Medications   • busPIRone (BUSPAR) 10 MG tablet     Sig: Take 1 tablet by mouth 3 (Three) Times a Day.     Dispense:  270 tablet     Refill:  0   • prazosin (MINIPRESS) 1 MG capsule     Sig: Take 1 capsule by mouth Every Night.     Dispense:  90 capsule     Refill:  0       Return in about 2 months (around 11/30/2021), or if symptoms worsen or fail to improve, for Recheck.        Patient will follow-up in 2 months, highly encouraged the patient if she had any questions or concerns to contact the [facility] clinic for sooner appointment patient verbalized understanding.      Functional Status: Mild impairment     Prognosis: Fair with  Ongoing Treatment             This document has been electronically signed by BHARAT Partida  September 30, 2021 08:07 EDT    Part of this note may be an electronic transcription/translation of spoken language to printed text using the Dragon [employer] System.

## 2021-10-07 ENCOUNTER — OFFICE VISIT (OUTPATIENT)
Dept: GASTROENTEROLOGY | Facility: CLINIC | Age: 56
End: 2021-10-07

## 2021-10-07 VITALS
BODY MASS INDEX: 30.27 KG/M2 | HEIGHT: 60 IN | WEIGHT: 154.2 LBS | DIASTOLIC BLOOD PRESSURE: 83 MMHG | SYSTOLIC BLOOD PRESSURE: 124 MMHG | HEART RATE: 83 BPM | TEMPERATURE: 97.3 F

## 2021-10-07 DIAGNOSIS — R13.19 ESOPHAGEAL DYSPHAGIA: ICD-10-CM

## 2021-10-07 DIAGNOSIS — K21.9 GASTROESOPHAGEAL REFLUX DISEASE WITHOUT ESOPHAGITIS: Primary | ICD-10-CM

## 2021-10-07 DIAGNOSIS — R14.2 ERUCTATION: ICD-10-CM

## 2021-10-07 PROCEDURE — 99214 OFFICE O/P EST MOD 30 MIN: CPT | Performed by: NURSE PRACTITIONER

## 2021-10-07 RX ORDER — DEXLANSOPRAZOLE 60 MG/1
60 CAPSULE, DELAYED RELEASE ORAL DAILY
Qty: 90 CAPSULE | Refills: 3 | Status: SHIPPED | OUTPATIENT
Start: 2021-10-07 | End: 2021-11-06

## 2021-10-07 NOTE — PROGRESS NOTES
GASTROENTEROLOGY OFFICE NOTE  Mallika Brewster  [id number]  1965    CARE TEAM  Patient Care Team:  Amanda Katz APRN as PCP - General (Nurse Practitioner)    Referring Provider: Amanda Katz APRN    Chief Complaint   Patient presents with   • Heartburn        HISTORY OF PRESENT ILLNESS:  Ms. Brewster is seen in follow-up with chronic heartburn.  She has had therapeutic failure to omeprazole, lansoprazole, esomeprazole, and AcipHex all taken daily and even increased to twice daily she has continual heartburn.  The addition of Pepcid 40 mg twice daily has also not provided any relief.  She states that she has a burning in her chest all day every day.  She complains further of very frequent burping.  She has been taking FDgard, a nutritional supplement for functional dyspepsia that has helped with bloating and does provide some benefit for her belching.    PAST MEDICAL HISTORY  Past Medical History:   Diagnosis Date   • Acid reflux    • Arthritis    • Back pain    • Bronchitis    • Diabetes mellitus (HCC)    • High cholesterol    • Left hip pain         PAST SURGICAL HISTORY  Past Surgical History:   Procedure Laterality Date   • CARPAL TUNNEL RELEASE Bilateral    • CHOLECYSTECTOMY     • HYSTERECTOMY     • LIPOMA EXCISION  11/05/2020    neck   • OOPHORECTOMY     • SHOULDER ROTATOR CUFF REPAIR Left         MEDICATIONS:    Current Outpatient Medications:   •  betamethasone valerate (VALISONE) 0.1 % ointment, Apply  topically to the appropriate area as directed 2 (Two) Times a Day., Disp: 45 g, Rfl: 2  •  busPIRone (BUSPAR) 10 MG tablet, Take 1 tablet by mouth 3 (Three) Times a Day., Disp: 270 tablet, Rfl: 0  •  famotidine (PEPCID) 40 MG tablet, TAKE 1 TABLET TWICE A DAY AS NEEDED FOR HEARTBURN, Disp: 60 tablet, Rfl: 11  •  FLUoxetine (PROzac) 40 MG capsule, Take 2 capsules by mouth Daily., Disp: 180 capsule, Rfl: 0  •  glucose blood (Glucose Meter Test) test strip, Use as instructed to  check blood glucose levels 3 times daily, Disp: 100 each, Rfl: 5  •  glucose monitor monitoring kit, 1 each As Needed (Check blood sugars 3 times daily PRN.)., Disp: 1 each, Rfl: 0  •  Januvia 50 MG tablet, TAKE 1 TABLET DAILY, Disp: 90 tablet, Rfl: 3  •  Lancets misc, Use as instructed to test blood glucose levels 3 times daily., Disp: 100 each, Rfl: 5  •  linaclotide (LINZESS) 145 MCG capsule capsule, Take 1 capsule by mouth Daily., Disp: 90 capsule, Rfl: 3  •  losartan (COZAAR) 25 MG tablet, Take 1 tablet by mouth Daily., Disp: , Rfl:   •  meloxicam (MOBIC) 15 MG tablet, Take 1 tablet by mouth Daily., Disp: 90 tablet, Rfl: 1  •  metFORMIN (GLUCOPHAGE) 1000 MG tablet, Take 1 tablet by mouth 2 (Two) Times a Day With Meals., Disp: 60 tablet, Rfl: 3  •  methocarbamol (ROBAXIN) 750 MG tablet, , Disp: , Rfl:   •  metoclopramide (Reglan) 10 MG tablet, Take 1 tablet by mouth 4 (Four) Times a Day Before Meals & at Bedtime As Needed (heartburn)., Disp: 60 tablet, Rfl: 0  •  montelukast (SINGULAIR) 10 MG tablet, Take 1 tablet by mouth Daily., Disp: 90 tablet, Rfl: 3  •  nabumetone (RELAFEN) 500 MG tablet, , Disp: , Rfl:   •  prazosin (MINIPRESS) 1 MG capsule, Take 1 capsule by mouth Every Night., Disp: 90 capsule, Rfl: 0  •  propranolol (INDERAL) 20 MG tablet, Take 1/2 to 1 tablet by mouth 3 times a day as needed for anxiety., Disp: 60 tablet, Rfl: 2  •  QUEtiapine (SEROquel) 25 MG tablet, Take 1-2 tablets by mouth At Night As Needed (sleep)., Disp: 180 tablet, Rfl: 0  •  simvastatin (ZOCOR) 20 MG tablet, Take 1 tablet by mouth Daily., Disp: 90 tablet, Rfl: 1  •  sucralfate (CARAFATE) 1 g tablet, Take 4 tablets by mouth Daily., Disp: , Rfl:   •  dexlansoprazole (DEXILANT) 60 MG capsule, Take 1 capsule by mouth Daily for 30 days., Disp: 90 capsule, Rfl: 3    Current Facility-Administered Medications:   •  triamcinolone acetonide (KENALOG-40) injection 40 mg, 40 mg, Intramuscular, Once, Amanda Katz,  "APRN    ALLERGIES  No Known Allergies    FAMILY HISTORY:  Family History   Adopted: Yes   Problem Relation Age of Onset   • Mental illness Other    • Schizophrenia Son        SOCIAL HISTORY  Social History     Socioeconomic History   • Marital status:      Spouse name: Not on file   • Number of children: Not on file   • Years of education: Not on file   • Highest education level: Not on file   Tobacco Use   • Smoking status: Former Smoker     Packs/day: 0.50     Start date: 9/22/1987     Quit date: 2/1/2018     Years since quitting: 3.6   • Smokeless tobacco: Never Used   Vaping Use   • Vaping Use: Never used   Substance and Sexual Activity   • Alcohol use: No   • Drug use: No   • Sexual activity: Defer       REVIEW OF SYSTEMS  Review of Systems   Constitutional: Negative for activity change, appetite change, chills, diaphoresis, fatigue, fever, unexpected weight gain and unexpected weight loss.   HENT: Positive for trouble swallowing. Negative for voice change.    Gastrointestinal: Positive for GERD and indigestion. Negative for abdominal distention, abdominal pain, anal bleeding, blood in stool, constipation, diarrhea, nausea, rectal pain and vomiting.         PHYSICAL EXAM   /83 (BP Location: Left arm, Patient Position: Sitting, Cuff Size: Adult)   Pulse 83   Temp 97.3 °F (36.3 °C) (Temporal)   Ht 152.4 cm (60\")   Wt 69.9 kg (154 lb 3.2 oz)   BMI 30.12 kg/m²   Physical Exam  Constitutional:       General: She is not in acute distress.     Appearance: She is well-developed.   HENT:      Head: Normocephalic and atraumatic.      Nose: Nose normal.   Eyes:      Conjunctiva/sclera: Conjunctivae normal.      Pupils: Pupils are equal, round, and reactive to light.   Pulmonary:      Effort: Pulmonary effort is normal.   Abdominal:      General: Bowel sounds are normal. There is no distension.      Palpations: Abdomen is soft.   Neurological:      Mental Status: She is alert and oriented to person, " place, and time.   Psychiatric:         Behavior: Behavior normal.         Judgment: Judgment normal.         ASSESSMENT / PLAN  1.  GERD  2.  Eructation  3.  Dysphagia  -Discontinue AcipHex  -Start Dexilant 60 mg daily  -FODMAP diet  -FDgard-additional samples provided  -EGD    Return in about 6 months (around 4/7/2022).    I discussed the patients findings and my recommendations with patient    BHARAT Cruz

## 2021-11-09 ENCOUNTER — OFFICE VISIT (OUTPATIENT)
Dept: INTERNAL MEDICINE | Facility: CLINIC | Age: 56
End: 2021-11-09

## 2021-11-09 VITALS
SYSTOLIC BLOOD PRESSURE: 124 MMHG | HEIGHT: 60 IN | TEMPERATURE: 96.2 F | RESPIRATION RATE: 16 BRPM | HEART RATE: 93 BPM | OXYGEN SATURATION: 98 % | WEIGHT: 153 LBS | DIASTOLIC BLOOD PRESSURE: 78 MMHG | BODY MASS INDEX: 30.04 KG/M2

## 2021-11-09 DIAGNOSIS — E78.2 MIXED HYPERLIPIDEMIA: ICD-10-CM

## 2021-11-09 DIAGNOSIS — E11.9 TYPE 2 DIABETES MELLITUS WITHOUT COMPLICATION, WITHOUT LONG-TERM CURRENT USE OF INSULIN (HCC): Primary | ICD-10-CM

## 2021-11-09 DIAGNOSIS — Z13.29 THYROID DISORDER SCREENING: ICD-10-CM

## 2021-11-09 DIAGNOSIS — Z13.21 ENCOUNTER FOR VITAMIN DEFICIENCY SCREENING: ICD-10-CM

## 2021-11-09 DIAGNOSIS — I10 ESSENTIAL HYPERTENSION: ICD-10-CM

## 2021-11-09 DIAGNOSIS — E55.9 VITAMIN D DEFICIENCY: ICD-10-CM

## 2021-11-09 DIAGNOSIS — F41.8 DEPRESSION WITH ANXIETY: ICD-10-CM

## 2021-11-09 LAB — HBA1C MFR BLD: 6.9 %

## 2021-11-09 PROCEDURE — 99214 OFFICE O/P EST MOD 30 MIN: CPT | Performed by: NURSE PRACTITIONER

## 2021-11-09 PROCEDURE — 83036 HEMOGLOBIN GLYCOSYLATED A1C: CPT | Performed by: NURSE PRACTITIONER

## 2021-11-09 RX ORDER — PANTOPRAZOLE SODIUM 40 MG/1
TABLET, DELAYED RELEASE ORAL
COMMUNITY
Start: 2021-10-13

## 2021-11-09 NOTE — PROGRESS NOTES
Subjective   Chief Complaint   Patient presents with   • Diabetes     f/u      Mallika Brewster is a 56 y.o. female here today for hypertension, diabetes, depression, and anxiety.  Blood pressure stable and at goal.  No headaches, changes in vision, shortness of breath, or chest pain.  Blood sugar has been well controlled.  A1c is 6.9 today.  She is following a heart healthy low-cholesterol diabetic diet.  She is trying to be physically active.  She is getting a breast reduction on December 13 that will help with back pain.  She continues to have increased GERD symptoms and has EGD ordered by gastroenterology.  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  She feels that symptoms are currently manageable with medications.  She is seeing a psychiatrist and therapist. Yearly screening labs and fasting lipid panel due after 1/12/22    I have reviewed the following portions of the patient's history and confirmed they are accurate: allergies, current medications, past family history, past medical history, past social history, past surgical history and problem list    I have personally completed the patient's review of systems.    Review of Systems   Constitutional: Positive for fatigue. Negative for activity change, appetite change, chills, diaphoresis, fever, unexpected weight gain and unexpected weight loss.   HENT: Negative for congestion, ear discharge, ear pain, mouth sores, nosebleeds, sinus pressure, sneezing and sore throat.    Eyes: Negative for pain, discharge and itching.   Respiratory: Negative for cough, chest tightness, shortness of breath and wheezing.    Cardiovascular: Negative for chest pain, palpitations and leg swelling.   Gastrointestinal: Negative for abdominal pain, constipation, diarrhea, nausea, vomiting, GERD and indigestion.        Colonoscopy - 2017 - normal   Endocrine: Negative for heat intolerance, polydipsia,  polyphagia and polyuria.   Genitourinary: Negative for dysuria, flank pain, frequency, hematuria and urgency.   Musculoskeletal: Positive for arthralgias, back pain, myalgias, neck pain and neck stiffness. Negative for gait problem and joint swelling.   Skin: Negative for color change, pallor and rash.   Allergic/Immunologic: Negative for environmental allergies and immunocompromised state.   Neurological: Negative for seizures, speech difficulty, weakness and numbness.   Hematological: Negative for adenopathy.   Psychiatric/Behavioral: Positive for depressed mood and stress. Negative for agitation, decreased concentration, sleep disturbance and suicidal ideas. The patient is nervous/anxious.        Current Outpatient Medications on File Prior to Visit   Medication Sig   • betamethasone valerate (VALISONE) 0.1 % ointment Apply  topically to the appropriate area as directed 2 (Two) Times a Day.   • busPIRone (BUSPAR) 10 MG tablet Take 1 tablet by mouth 3 (Three) Times a Day.   • famotidine (PEPCID) 40 MG tablet TAKE 1 TABLET TWICE A DAY AS NEEDED FOR HEARTBURN   • FLUoxetine (PROzac) 40 MG capsule Take 2 capsules by mouth Daily.   • glucose blood (Glucose Meter Test) test strip Use as instructed to check blood glucose levels 3 times daily   • glucose monitor monitoring kit 1 each As Needed (Check blood sugars 3 times daily PRN.).   • Januvia 50 MG tablet TAKE 1 TABLET DAILY   • Lancets misc Use as instructed to test blood glucose levels 3 times daily.   • linaclotide (LINZESS) 145 MCG capsule capsule Take 1 capsule by mouth Daily.   • losartan (COZAAR) 25 MG tablet Take 1 tablet by mouth Daily.   • meloxicam (MOBIC) 15 MG tablet Take 1 tablet by mouth Daily.   • metFORMIN (GLUCOPHAGE) 1000 MG tablet Take 1 tablet by mouth 2 (Two) Times a Day With Meals.   • methocarbamol (ROBAXIN) 750 MG tablet    • metoclopramide (Reglan) 10 MG tablet Take 1 tablet by mouth 4 (Four) Times a Day Before Meals & at Bedtime As Needed  "(heartburn).   • montelukast (SINGULAIR) 10 MG tablet Take 1 tablet by mouth Daily.   • nabumetone (RELAFEN) 500 MG tablet    • pantoprazole (PROTONIX) 40 MG EC tablet    • prazosin (MINIPRESS) 1 MG capsule Take 1 capsule by mouth Every Night.   • propranolol (INDERAL) 20 MG tablet Take 1/2 to 1 tablet by mouth 3 times a day as needed for anxiety.   • QUEtiapine (SEROquel) 25 MG tablet Take 1-2 tablets by mouth At Night As Needed (sleep).   • simvastatin (ZOCOR) 20 MG tablet Take 1 tablet by mouth Daily.   • sucralfate (CARAFATE) 1 g tablet Take 4 tablets by mouth Daily.     Current Facility-Administered Medications on File Prior to Visit   Medication   • triamcinolone acetonide (KENALOG-40) injection 40 mg       Objective   Vitals:    11/09/21 0847   BP: 124/78   Pulse: 93   Resp: 16   Temp: 96.2 °F (35.7 °C)   TempSrc: Temporal   SpO2: 98%   Weight: 69.4 kg (153 lb)   Height: 152.4 cm (60\")     Body mass index is 29.88 kg/m².    Physical Exam  Vitals reviewed.   Constitutional:       Appearance: Normal appearance. She is well-developed.   HENT:      Head: Normocephalic and atraumatic.      Nose: Nose normal.   Eyes:      General: Lids are normal.      Conjunctiva/sclera: Conjunctivae normal.      Pupils: Pupils are equal, round, and reactive to light.   Neck:      Thyroid: No thyromegaly.      Trachea: Trachea normal.   Pulmonary:      Effort: Pulmonary effort is normal. No respiratory distress.   Musculoskeletal:      Cervical back: Spasms and tenderness present.      Thoracic back: Spasms and tenderness present.      Lumbar back: Spasms and tenderness present.   Skin:     General: Skin is warm and dry.      Comments: Red inflamed patches on arms and legs bilaterally.   Neurological:      Mental Status: She is alert and oriented to person, place, and time.      GCS: GCS eye subscore is 4. GCS verbal subscore is 5. GCS motor subscore is 6.   Psychiatric:         Attention and Perception: Attention normal.         " Mood and Affect: Affect normal. Mood is anxious.         Speech: Speech normal.         Behavior: Behavior normal. Behavior is cooperative.         Assessment/Plan   Problem List Items Addressed This Visit        Cardiac and Vasculature    Essential hypertension  Chronic issue stable requiring medication management and monitoring. Will eat well balanced heart healthy diet, drink adequate water, increase physical activity, and get adequate rest. Monitor blood pressures daily and contact office for any readings consistently above 140/90. Patient will report any associated symptoms such as headaches, blurry vision, or nausea. Patient will go to ER for any chest pressure or chest pain.   Continue losartan and propranolol      Relevant Orders    CBC (No Diff)    Comprehensive Metabolic Panel    Lipid Panel    Hyperlipidemia  Chronic stable requiring medication management, lifestyle changes, and monitoring. Discussed how this being unstable increases risk of cardiovascular disease and adverse events. Will follow a hearth healthy diet low in cholesterol and fat. Discussed increasing fiber intake. Suggested fish oil or omega 3 supplement of 1200mg twice daily. Educated on how these help lower triglycerides and LDL. Will drink adequate water and increase physical activity as tolerated. Discussed importance of medication compliance.   Continue zocor      Relevant Orders    Lipid Panel       Endocrine and Metabolic    Vitamin D deficiency  Chronic issue requiring diet changes, monitoring, and dietary supplement. Will increase dietary intake of Vitamin D through dairy milk, plant/nut based milk, and fortified foods such as juice and cereal. Will start dietary supplement as directed.       Relevant Orders    Vitamin D 25 Hydroxy      Other Visit Diagnoses     Type 2 diabetes mellitus without complication, without long-term current use of insulin (HCC)    -  Primary  Chronic issue stable requiring medication management and  monitoring. Will eat well balanced heart healthy diabetic diet, drink adequate water, increase physical activity, and get adequate rest. Will monitor blood sugars daily and keep log for next appt. Will contact office earlier if blood sugars are averaging above 250.   Continue metformin and januvia.       Relevant Orders    POCT glycated hemoglobin, total (Completed)    CBC (No Diff)    Comprehensive Metabolic Panel    Hemoglobin A1c    Depression with anxiety      Chronic issue unstable requiring medication management and monitoring. Will eat well balanced diet, increase water intake, increase physical activity during the day, and get adequate rest. Discussed relaxation and coping skills and exercises.   Continue buspar prozac, prazosin, propranolol, and seroquel. Continue follow ups with psychiatrist and therapist.      Encounter for vitamin deficiency screening        Relevant Orders    Vitamin B12 & Folate    Thyroid disorder screening        Relevant Orders    TSH Rfx On Abnormal To Free T4         Will come by for fasting labs after 1/12/22    Current Outpatient Medications:   •  betamethasone valerate (VALISONE) 0.1 % ointment, Apply  topically to the appropriate area as directed 2 (Two) Times a Day., Disp: 45 g, Rfl: 2  •  busPIRone (BUSPAR) 10 MG tablet, Take 1 tablet by mouth 3 (Three) Times a Day., Disp: 270 tablet, Rfl: 0  •  famotidine (PEPCID) 40 MG tablet, TAKE 1 TABLET TWICE A DAY AS NEEDED FOR HEARTBURN, Disp: 60 tablet, Rfl: 11  •  FLUoxetine (PROzac) 40 MG capsule, Take 2 capsules by mouth Daily., Disp: 180 capsule, Rfl: 0  •  glucose blood (Glucose Meter Test) test strip, Use as instructed to check blood glucose levels 3 times daily, Disp: 100 each, Rfl: 5  •  glucose monitor monitoring kit, 1 each As Needed (Check blood sugars 3 times daily PRN.)., Disp: 1 each, Rfl: 0  •  Januvia 50 MG tablet, TAKE 1 TABLET DAILY, Disp: 90 tablet, Rfl: 3  •  Lancets misc, Use as instructed to test blood glucose  levels 3 times daily., Disp: 100 each, Rfl: 5  •  linaclotide (LINZESS) 145 MCG capsule capsule, Take 1 capsule by mouth Daily., Disp: 90 capsule, Rfl: 3  •  losartan (COZAAR) 25 MG tablet, Take 1 tablet by mouth Daily., Disp: , Rfl:   •  meloxicam (MOBIC) 15 MG tablet, Take 1 tablet by mouth Daily., Disp: 90 tablet, Rfl: 1  •  metFORMIN (GLUCOPHAGE) 1000 MG tablet, Take 1 tablet by mouth 2 (Two) Times a Day With Meals., Disp: 60 tablet, Rfl: 3  •  methocarbamol (ROBAXIN) 750 MG tablet, , Disp: , Rfl:   •  metoclopramide (Reglan) 10 MG tablet, Take 1 tablet by mouth 4 (Four) Times a Day Before Meals & at Bedtime As Needed (heartburn)., Disp: 60 tablet, Rfl: 0  •  montelukast (SINGULAIR) 10 MG tablet, Take 1 tablet by mouth Daily., Disp: 90 tablet, Rfl: 3  •  nabumetone (RELAFEN) 500 MG tablet, , Disp: , Rfl:   •  pantoprazole (PROTONIX) 40 MG EC tablet, , Disp: , Rfl:   •  prazosin (MINIPRESS) 1 MG capsule, Take 1 capsule by mouth Every Night., Disp: 90 capsule, Rfl: 0  •  propranolol (INDERAL) 20 MG tablet, Take 1/2 to 1 tablet by mouth 3 times a day as needed for anxiety., Disp: 60 tablet, Rfl: 2  •  QUEtiapine (SEROquel) 25 MG tablet, Take 1-2 tablets by mouth At Night As Needed (sleep)., Disp: 180 tablet, Rfl: 0  •  simvastatin (ZOCOR) 20 MG tablet, Take 1 tablet by mouth Daily., Disp: 90 tablet, Rfl: 1  •  sucralfate (CARAFATE) 1 g tablet, Take 4 tablets by mouth Daily., Disp: , Rfl:     Current Facility-Administered Medications:   •  triamcinolone acetonide (KENALOG-40) injection 40 mg, 40 mg, Intramuscular, Once, Amanda Katz APRN       Plan of care reviewed with the patient at the conclusion of today's visit.  Education was provided regarding diagnosis, management, and any prescribed or recommended OTC medications.  Patient verbalized understanding of and agreement with management plan.     Return in about 3 months (around 2/9/2022), or if symptoms worsen or fail to improve.      Amanda  Rusty Lagos, APRN

## 2021-11-16 DIAGNOSIS — Z01.812 ENCOUNTER FOR PREPROCEDURE SCREENING LABORATORY TESTING FOR COVID-19: Primary | ICD-10-CM

## 2021-11-16 DIAGNOSIS — Z20.822 ENCOUNTER FOR PREPROCEDURE SCREENING LABORATORY TESTING FOR COVID-19: Primary | ICD-10-CM

## 2021-11-17 ENCOUNTER — APPOINTMENT (OUTPATIENT)
Dept: PREADMISSION TESTING | Facility: HOSPITAL | Age: 56
End: 2021-11-17

## 2021-11-17 DIAGNOSIS — Z20.822 ENCOUNTER FOR PREPROCEDURE SCREENING LABORATORY TESTING FOR COVID-19: ICD-10-CM

## 2021-11-17 DIAGNOSIS — Z01.812 ENCOUNTER FOR PREPROCEDURE SCREENING LABORATORY TESTING FOR COVID-19: ICD-10-CM

## 2021-11-17 LAB — SARS-COV-2 RNA PNL SPEC NAA+PROBE: NOT DETECTED

## 2021-11-17 PROCEDURE — C9803 HOPD COVID-19 SPEC COLLECT: HCPCS

## 2021-11-17 PROCEDURE — U0004 COV-19 TEST NON-CDC HGH THRU: HCPCS

## 2021-11-19 ENCOUNTER — OUTSIDE FACILITY SERVICE (OUTPATIENT)
Dept: GASTROENTEROLOGY | Facility: CLINIC | Age: 56
End: 2021-11-19

## 2021-11-19 PROCEDURE — 43249 ESOPH EGD DILATION <30 MM: CPT | Performed by: INTERNAL MEDICINE

## 2021-11-19 PROCEDURE — 43239 EGD BIOPSY SINGLE/MULTIPLE: CPT | Performed by: INTERNAL MEDICINE

## 2021-11-19 PROCEDURE — 88305 TISSUE EXAM BY PATHOLOGIST: CPT | Performed by: INTERNAL MEDICINE

## 2021-11-22 ENCOUNTER — LAB REQUISITION (OUTPATIENT)
Dept: LAB | Facility: HOSPITAL | Age: 56
End: 2021-11-22

## 2021-11-22 DIAGNOSIS — R12 HEARTBURN: ICD-10-CM

## 2021-11-22 DIAGNOSIS — K44.9 DIAPHRAGMATIC HERNIA WITHOUT OBSTRUCTION OR GANGRENE: ICD-10-CM

## 2021-11-22 DIAGNOSIS — R13.10 DYSPHAGIA, UNSPECIFIED: ICD-10-CM

## 2021-11-23 LAB
CYTO UR: NORMAL
LAB AP CASE REPORT: NORMAL
LAB AP CLINICAL INFORMATION: NORMAL
PATH REPORT.FINAL DX SPEC: NORMAL
PATH REPORT.GROSS SPEC: NORMAL

## 2021-11-24 DIAGNOSIS — E11.9 TYPE 2 DIABETES MELLITUS WITHOUT COMPLICATION, WITHOUT LONG-TERM CURRENT USE OF INSULIN (HCC): ICD-10-CM

## 2021-11-29 ENCOUNTER — TELEMEDICINE (OUTPATIENT)
Dept: PSYCHIATRY | Facility: CLINIC | Age: 56
End: 2021-11-29

## 2021-11-29 DIAGNOSIS — F41.1 GENERALIZED ANXIETY DISORDER: Primary | Chronic | ICD-10-CM

## 2021-11-29 DIAGNOSIS — F41.0 PANIC DISORDER (EPISODIC PAROXYSMAL ANXIETY): ICD-10-CM

## 2021-11-29 DIAGNOSIS — F33.0 MILD EPISODE OF RECURRENT MAJOR DEPRESSIVE DISORDER (HCC): Chronic | ICD-10-CM

## 2021-11-29 DIAGNOSIS — G47.9 SLEEPING DIFFICULTIES: ICD-10-CM

## 2021-11-29 DIAGNOSIS — F43.10 POST TRAUMATIC STRESS DISORDER (PTSD): ICD-10-CM

## 2021-11-29 DIAGNOSIS — F51.5 NIGHTMARES: ICD-10-CM

## 2021-11-29 PROCEDURE — 99214 OFFICE O/P EST MOD 30 MIN: CPT | Performed by: NURSE PRACTITIONER

## 2021-11-29 RX ORDER — QUETIAPINE FUMARATE 25 MG/1
25-50 TABLET, FILM COATED ORAL NIGHTLY PRN
Qty: 180 TABLET | Refills: 0 | Status: SHIPPED | OUTPATIENT
Start: 2021-11-29 | End: 2022-01-24 | Stop reason: SDUPTHER

## 2021-11-29 RX ORDER — BUSPIRONE HYDROCHLORIDE 15 MG/1
15 TABLET ORAL 3 TIMES DAILY
Qty: 270 TABLET | Refills: 0 | Status: SHIPPED | OUTPATIENT
Start: 2021-11-29 | End: 2022-01-24 | Stop reason: SDUPTHER

## 2021-11-29 NOTE — PROGRESS NOTES
"This provider is located at the [facility] (through [facility]), [address], [place], [address] using a secure [employer] Video Visit through [employer]. Patient is being seen remotely via telehealth at their home address in [place], and stated they are in a secure environment for this session. The patient's condition being diagnosed/treated is appropriate for telemedicine. The provider identified herself as well as her credentials. The patient, and/or patients guardian, consent to be seen remotely, and when consent is given they understand that the consent allows for patient identifiable information to be sent to a third party as needed. They may refuse to be seen remotely at any time. The electronic data is encrypted and password protected, and the patient and/or guardian has been advised of the potential risks to privacy not withstanding such measures.    You have chosen to receive care through a telehealth visit.  Do you consent to use a video/audio connection for your medical care today? Yes     Subjective   Mallika Brewster is a 56 y.o. female who is here today for medication management follow up.    Chief Complaint: Depression, anxiety, sleeping difficulties    History of Present Illness:  The patient describes her mood as \"okay\" over the last few weeks.  The patient states that she has been doing okay recently, but states that she has noticed that her mood has been worse recently.  The patient states that she has noticed that her depression has been a little worse recently, but states that she has had some situational stressors that have contributed to this.  The patient explains in detail the situational stressors that have contributed to her worsening mood.  The patient explains that she doesn't feel like she has any support with her mentally ill son and .  The patient states that she would like to discuss medication changes to help with her mood at today's " "encounter.  The patient reports her appetite as good.  The patient reports her sleep as fluctuating.  The patient states that she sleep well \"half of the time\".  The patient states that this is due to her son and him waking her up in the nighttime.  The patient states that overall she is pleased with her sleep and doesn't want to make any changes to her sleep medications.  The patient denies any nightmares or bad dreams.  The patient denies any new medical problems or changes in medications since last appointment with this facility.  The patient reports compliance with current medication regimen.  The patient denies any current side effects from her current medication regimen.  The patient denies any abnormal muscle movements or tics.  The patient rates her depression at a 5-6/10 on a 0-10 scale, with 10 being the worst.  The patient rates her anxiety at a 5-6/10 on a 0-10 scale, with 10 being the worst.  The patient rates hopelessness at a 0/10 on a 0-10 scale with 10 being the worst.  The patient denies suicidal ideations and homicidal ideations, and is convincing.  The patient denies any auditory hallucinations or visual hallucinations.  The patient does not endorse significant symptoms consistent with bipolar disorder or a psychotic illness.                  The patient denies any chance of pregnancy at this time.  The patient was educated that her prescribed medications can have potential risk to a developing fetus. The patient is advised to contact this APRN/this office if she becomes pregnant or plans to become pregnant.  Pt verbalizes understanding and acknowledged agreement with this plan in her own words.        The following portions of the patient's history were reviewed and updated as appropriate: allergies, current medications, past family history, past medical history, past social history, past surgical history and problem list.    Review of Systems   Constitutional: Positive for activity change and " fatigue. Negative for appetite change and unexpected weight change.   Psychiatric/Behavioral: Positive for dysphoric mood and sleep disturbance. Negative for agitation, behavioral problems, confusion, decreased concentration, hallucinations, self-injury and suicidal ideas. The patient is nervous/anxious. The patient is not hyperactive.        Objective   Physical Exam  Constitutional:       Appearance: Normal appearance.   Neurological:      Mental Status: She is alert.   Psychiatric:         Attention and Perception: Attention and perception normal.         Mood and Affect: Affect normal. Mood is anxious and depressed.         Speech: Speech normal.         Behavior: Behavior normal. Behavior is cooperative.         Thought Content: Thought content normal. Thought content does not include homicidal or suicidal ideation. Thought content does not include homicidal or suicidal plan.         Cognition and Memory: Cognition and memory normal.         Judgment: Judgment normal.       not currently breastfeeding.    The patient was seen remotely today via a MyChart Video Visit through [facility].  Unable to obtain vital signs due to nature of remote visit.  Height stated at 60 inches.  Weight stated at 153 pounds.     No Known Allergies    Recent Results (from the past 2016 hour(s))   POCT glycated hemoglobin, total    Collection Time: 11/09/21  9:01 AM    Specimen: Urine   Result Value Ref Range    Hemoglobin A1C 6.9 %   COVID-19, APTIMA PANTHER EDIN IN-HOUSE NP/OP SWAB IN UTM/VTM/SALINE TRANSPORT MEDIA 24HR TAT - Swab, Nasopharynx    Collection Time: 11/17/21 10:39 AM    Specimen: Nasopharynx; Swab   Result Value Ref Range    COVID19 Not Detected Not Detected - Ref. Range   Tissue Pathology Exam    Collection Time: 11/19/21  2:45 PM    Specimen: Esophagus; Tissue   Result Value Ref Range    Case Report       Surgical Pathology Report                         Case: [id number]                                  Authorizing  Provider:  Axel Saab     Collected:           11/19/2021 02:45 PM                                 MD Artur                                                                    Ordering Location:     [facility]   Received:            11/22/2021 09:08 AM                                 LABORATORY                                                                   Pathologist:           Aneudy Landaverde MD                                                           Specimen:    Esophagus                                                                                  Clinical Information       Dysphagia, heartburn, diaphragmatic hernia without obstruction or gangrene      Final Diagnosis       ESOPHAGUS, DISTAL, BIOPSY:                Gastroesophageal type mucosa with reactive epithelial changes and underlying mixed inflammatory infiltrate                 Negative for eosinophilic esophagitis (0-1 eosinophils/hpf)                Negative for intestinal metaplasia, dysplasia and malignancy      Gross Description          Specimen 1 received in formalin labeled esophagus are multiple white soft tissue fragments aggregating 1 x 0.8 x 0.2 cm submitted entirely in a single cassette.  HM      Microscopic Description       The slides are reviewed and demonstrate histopathologic features supporting the above rendered diagnosis.           Current Medications:   Current Outpatient Medications   Medication Sig Dispense Refill   • betamethasone valerate (VALISONE) 0.1 % ointment Apply  topically to the appropriate area as directed 2 (Two) Times a Day. 45 g 2   • busPIRone (BUSPAR) 15 MG tablet Take 1 tablet by mouth 3 (Three) Times a Day. 270 tablet 0   • famotidine (PEPCID) 40 MG tablet TAKE 1 TABLET TWICE A DAY AS NEEDED FOR HEARTBURN 60 tablet 11   • FLUoxetine (PROzac) 40 MG capsule Take 2 capsules by mouth Daily. 180 capsule 0   • glucose blood (Glucose Meter Test) test strip Use as instructed to check  blood glucose levels 3 times daily 100 each 5   • glucose monitor monitoring kit 1 each As Needed (Check blood sugars 3 times daily PRN.). 1 each 0   • Januvia 50 MG tablet TAKE 1 TABLET DAILY 90 tablet 3   • Lancets misc Use as instructed to test blood glucose levels 3 times daily. 100 each 5   • linaclotide (LINZESS) 145 MCG capsule capsule Take 1 capsule by mouth Daily. 90 capsule 3   • losartan (COZAAR) 25 MG tablet Take 1 tablet by mouth Daily.     • meloxicam (MOBIC) 15 MG tablet Take 1 tablet by mouth Daily. 90 tablet 1   • metFORMIN (GLUCOPHAGE) 1000 MG tablet TAKE 1 TABLET TWICE A DAY WITH MEALS 60 tablet 11   • methocarbamol (ROBAXIN) 750 MG tablet      • metoclopramide (Reglan) 10 MG tablet Take 1 tablet by mouth 4 (Four) Times a Day Before Meals & at Bedtime As Needed (heartburn). 60 tablet 0   • montelukast (SINGULAIR) 10 MG tablet Take 1 tablet by mouth Daily. 90 tablet 3   • nabumetone (RELAFEN) 500 MG tablet      • pantoprazole (PROTONIX) 40 MG EC tablet      • prazosin (MINIPRESS) 1 MG capsule Take 1 capsule by mouth Every Night. 90 capsule 0   • propranolol (INDERAL) 20 MG tablet Take 1/2 to 1 tablet by mouth 3 times a day as needed for anxiety. 60 tablet 2   • QUEtiapine (SEROquel) 25 MG tablet Take 1-2 tablets by mouth At Night As Needed (sleep). 180 tablet 0   • simvastatin (ZOCOR) 20 MG tablet Take 1 tablet by mouth Daily. 90 tablet 1   • sucralfate (CARAFATE) 1 g tablet Take 4 tablets by mouth Daily.       Current Facility-Administered Medications   Medication Dose Route Frequency Provider Last Rate Last Admin   • triamcinolone acetonide (KENALOG-40) injection 40 mg  40 mg Intramuscular Once Amanda Katz APRN           Mental Status Exam:   Hygiene:   good  Cooperation:  Cooperative  Eye Contact:  Good  Psychomotor Behavior:  Appropriate  Affect:  Full range  Hopelessness: Denies  Speech:  Normal  Thought Process:  Goal directed  Thought Content:  Normal  Suicidal:   None  Homicidal:  None  Hallucinations:  None  Delusion:  None  Memory:  Intact  Orientation:  Person, Place, Time and Situation  Reliability:  good  Insight:  Good  Judgement:  Good  Impulse Control:  Good  Physical/Medical Issues:  Yes See medical history      Assessment/Plan   Diagnoses and all orders for this visit:    1. Generalized anxiety disorder (Primary)  -     busPIRone (BUSPAR) 15 MG tablet; Take 1 tablet by mouth 3 (Three) Times a Day.  Dispense: 270 tablet; Refill: 0    2. Panic disorder (episodic paroxysmal anxiety)  -     busPIRone (BUSPAR) 15 MG tablet; Take 1 tablet by mouth 3 (Three) Times a Day.  Dispense: 270 tablet; Refill: 0    3. Post traumatic stress disorder (PTSD)    4. Mild episode of recurrent major depressive disorder (HCC)  -     busPIRone (BUSPAR) 15 MG tablet; Take 1 tablet by mouth 3 (Three) Times a Day.  Dispense: 270 tablet; Refill: 0    5. Sleeping difficulties  -     QUEtiapine (SEROquel) 25 MG tablet; Take 1-2 tablets by mouth At Night As Needed (sleep).  Dispense: 180 tablet; Refill: 0    6. Nightmares            Visit Diagnoses:    ICD-10-CM ICD-9-CM   1. Generalized anxiety disorder  F41.1 300.02   2. Panic disorder (episodic paroxysmal anxiety)  F41.0 300.01   3. Post traumatic stress disorder (PTSD)  F43.10 309.81   4. Mild episode of recurrent major depressive disorder (HCC)  F33.0 296.31   5. Sleeping difficulties  G47.9 780.50   6. Nightmares  F51.5 307.47       GOALS:  Short Term Goals: Patient will be compliant with medication, and patient will have no significant medication related side effects.  Patient will be engaged in psychotherapy as indicated.  Patient will report subjective improvement of symptoms.  Long term goals: To stabilize mood and treat/improve subjective symptoms, the patient will stay out of the hospital, the patient will be at an optimal level of functioning, and the patient will take all medications as prescribed.  The patient verbalized  understanding and agreement with goals that were mutually set.      SUICIDE RISK ASSESSMENT: Unalterable demographics and a history of mental health intervention indicate this patient is in a high risk category compared to the general population. At present, the patient denies active SI/HI, intentions, or plans at this time and agrees to seek immediate care should such thoughts develop. The patient verbalizes understanding of how to access emergency care if needed and agrees to do so. Consideration of suicide risk and protective factors such as history, current presentation, individual strengths and weaknesses, psychosocial and environmental stressors and variables, psychiatric illness and symptoms, medical conditions and pain, took place in this interview. Based on those considerations, the patient is determined: within individual baseline and presenting no imminent risk for suicide or homicide. Other recommendations: The patient does not meet the criteria for inpatient admission and is not a safety risk to self or others at today's visit. Inpatient treatment offers no significant advantages over outpatient treatment for this patient at today's visit.      SAFETY PLAN:  Patient was given ample time for questions and fully participated in treatment planning.  Patient was encouraged to call the clinic with any questions or concerns.  Patient was informed of access to emergency care. If patient were to develop any significant symptomatology, suicidal ideation, homicidal ideation, any concerns, or feel unsafe at any time they are to call the clinic and if unable to get immediate assistance should immediately call 911 or go to the nearest emergency room.  The patient is advised to remove or secure (lock away) all lethal weapons (including guns) and sharps (including razors, scissors, knives, etc.).  All medications (including any prescribed and any over the counter medications) should be stored in a safe and secured  location that is not obtainable by children/adolescents.  Patient was given an opportunity and encouraged to ask questions about their medication, illness, and treatment. Patient contracted verbally for the following: If you are experiencing an emotional crisis or have thoughts of harming yourself or others, please go to your nearest local emergency room or call 911. Will continue to re-assess medication response and side effects frequently to establish efficacy and ensure safety. Risks, any black box warnings, side effects, off label usage, and benefits of medication and treatment discussed with patient, along with potential adverse side effects of current and/or newly prescribed medication, alternative treatment options, and OTC medications.  Patient verbalized understanding of potential risks, any off label use of medication, any black box warnings, and any side effects in their own words. The patient verbalized understanding and agreed to comply with the safety plan discussed in their own words.  Patient given the number to the office. Number also available to the 24- hour suicide hotline.      TREATMENT PLAN/GOALS: Continue medications and treatment plan as indicated. Treatment and medication options discussed during today's visit. Patient acknowledged and verbally consented to continue with current treatment plan and was educated on the importance of compliance with treatment and follow-up appointments.        -Increase Buspar to 15 mg three time daily as adjunct for anxiety and depression  -Continue Prozac 40 mg daily for anxiety, depression, and PTSD  -Continue Prazosin 1 mg nightly for nightmares/sleeping difficulties  -Continue Seroquel 25-50 mg nightly as needed for sleeping difficulties  -Continue Propanolol 10-20 mg three times daily as needed for anxiety/panic        MEDICATION ISSUES: Discussed medication options and treatment plan of prescribed medication, any off label use of medication, as well as  the risks, benefits, any black box warnings including increased suicidality, and side effects including but not limited to potential falls, dizziness, possible impaired driving, GI side effects (change in appetite, abdominal discomfort, nausea, vomiting, diarrhea, and/or constipation), dry mouth, somnolence, sedation, insomnia, activation, agitation, irritation, tremors, abnormal muscle movements or disorders, tardive dyskinesia, akathisia, asthenia, headache, sweating, possible bruising or rare bleeding, electrolyte and/or fluid abnormalities, change in blood pressure/heart rate/and or heart rhythm, hypotension, sexual dysfunction, rare impulse control problems, rare seizures, rare neuroleptic malignant syndrome, increased risk of death and cerebrovascular events, change in blood glucose and increased risk for diabetes, change in triglycerides and cholesterol and increased risk for dyslipidemia,  weight gain, weight gain that can become problematic to health, skin conditions and reactions, and metabolic adversities among others. Patient and/or guardian are agreeable to call the office with any worsening of symptoms or onset of side effects, or if any concerns or questions arise.  The contact information for the office is made available to the patient and/or guardian. Patient and/or guardian are agreeable to call 911 or go to the nearest ER should they begin having any SI/HI, or if any urgent concerns arise. No medication side effects or related complaints today.    This APRN has discussed with the patient/guardian about the possibility of serotonin syndrome when certain medications are taken together, as is the case with this patient.  This APRN has provided the patient/guardian with a list of symptoms of serotonin syndrome including symptoms of autonomic instability, altered sensorium, confusion, restlessness, agitation, myoclonus, hyperreflexia, hyperthermia, diaphoresis, tremor, chills, diarrhea and cramps,  ataxia, headache, migraines, seizures, and insomnia; which could lead to permanent hyperthermic brain damage, cardiovascular collapse, coma, or even death.  The patient/guardian are instructed to stop medications immediately and either contact this APRN/this office during regular office hours, or go to the emergency department/call 911, if they begin to experience any of the symptoms discussed.  The benefits and risks of the current medication regimen are discussed with the patient/guardian, and they feel that the benefits out weigh the risks.  The patient/guardian verbalized understanding and agreement in their own words.                    [facility] No Show Policy:  We understand unexpected circumstances arise; however, anytime you miss your appointment we are unable to provide you appropriate care.  In addition, each appointment missed could have been used to provide care for others.  We ask that you call at least 24 hours in advance to cancel or reschedule an appointment.  We would like to take this opportunity to remind you of our policy stating patients who miss THREE or more appointments without cancelling or rescheduling 24 hours in advance of the appointment may be subject to cancellation of any further visits with our clinic and recommendation to seek in-person services/visits.    Please call [phone] to reschedule your appointment. If there are reasons that make it difficult for you to keep the appointments, please call and let us know how we can help.  Please understand that medication prescribing will not continue without seeing your provider.      [facility]'s No Show Policy reviewed with patient at today's visit. Patient verbalized understanding of this policy. Discussed with patient that in the event that there are three or more no show visits, it will be recommended that they pursue in-person services/visits as noncompliance with telehealth visits  indicates that patient is not an appropriate candidate for telemedicine and would likely be more appropriate for in-person services/visits. Patient verbalizes understanding and is agreeable to this.          MEDS ORDERED DURING VISIT:  New Medications Ordered This Visit   Medications   • QUEtiapine (SEROquel) 25 MG tablet     Sig: Take 1-2 tablets by mouth At Night As Needed (sleep).     Dispense:  180 tablet     Refill:  0   • busPIRone (BUSPAR) 15 MG tablet     Sig: Take 1 tablet by mouth 3 (Three) Times a Day.     Dispense:  270 tablet     Refill:  0       Return in about 4 weeks (around 12/27/2021), or if symptoms worsen or fail to improve, for Recheck.        Patient will follow-up in 4 weeks, highly encouraged the patient if she had any questions or concerns to contact the behavioral health [facility] clinic for sooner appointment patient verbalized understanding.      Functional Status: Moderate impairment     Prognosis: Fair with Ongoing Treatment             This document has been electronically signed by BHARAT Partida  November 29, 2021 08:11 EST    Part of this note may be an electronic transcription/translation of spoken language to printed text using the Dragon Dictation System.

## 2021-12-06 ENCOUNTER — OFFICE VISIT (OUTPATIENT)
Dept: GASTROENTEROLOGY | Facility: CLINIC | Age: 56
End: 2021-12-06

## 2021-12-06 VITALS
HEART RATE: 88 BPM | HEIGHT: 60 IN | SYSTOLIC BLOOD PRESSURE: 129 MMHG | TEMPERATURE: 97.3 F | BODY MASS INDEX: 30.51 KG/M2 | WEIGHT: 155.4 LBS | DIASTOLIC BLOOD PRESSURE: 101 MMHG

## 2021-12-06 DIAGNOSIS — R10.13 DYSPEPSIA: ICD-10-CM

## 2021-12-06 DIAGNOSIS — K21.9 GASTROESOPHAGEAL REFLUX DISEASE WITHOUT ESOPHAGITIS: Primary | ICD-10-CM

## 2021-12-06 PROCEDURE — 99213 OFFICE O/P EST LOW 20 MIN: CPT | Performed by: NURSE PRACTITIONER

## 2021-12-06 NOTE — PROGRESS NOTES
GASTROENTEROLOGY OFFICE NOTE  Mallika Brewster  [id number]  1965    CARE TEAM  Patient Care Team:  Amanda Katz APRN as PCP - General (Nurse Practitioner)    Referring Provider: Amanda Katz APRN    Chief Complaint   Patient presents with   • Heartburn   • Abdominal Pain     bloating        HISTORY OF PRESENT ILLNESS:  Ms. Brewster returns in follow-up status post EGD for complaints of intermittent dysphagia to solids, heartburn, and most significantly abdominal bloating.  EGD was normal with the exception of a small hiatal hernia.  Biopsies of the esophagus were unremarkable, specifically negative for Smith's esophagus.    She is currently taking pantoprazole 40 mg twice daily and Pepcid 20 mg twice daily with fairly good control of her heartburn.  She reports she has problems maybe once a week.  She continues with significant complaints of bloating which does not occur on a daily basis but is very uncomfortable to her when she does have it.    PAST MEDICAL HISTORY  Past Medical History:   Diagnosis Date   • Acid reflux    • Arthritis    • Back pain    • Bronchitis    • Diabetes mellitus (HCC)    • High cholesterol    • Left hip pain         PAST SURGICAL HISTORY  Past Surgical History:   Procedure Laterality Date   • CARPAL TUNNEL RELEASE Bilateral    • CHOLECYSTECTOMY     • HYSTERECTOMY     • LIPOMA EXCISION  11/05/2020    neck   • OOPHORECTOMY     • SHOULDER ROTATOR CUFF REPAIR Left         MEDICATIONS:    Current Outpatient Medications:   •  betamethasone valerate (VALISONE) 0.1 % ointment, Apply  topically to the appropriate area as directed 2 (Two) Times a Day., Disp: 45 g, Rfl: 2  •  busPIRone (BUSPAR) 15 MG tablet, Take 1 tablet by mouth 3 (Three) Times a Day., Disp: 270 tablet, Rfl: 0  •  famotidine (PEPCID) 40 MG tablet, TAKE 1 TABLET TWICE A DAY AS NEEDED FOR HEARTBURN, Disp: 60 tablet, Rfl: 11  •  FLUoxetine (PROzac) 40 MG capsule, Take 2 capsules by mouth Daily., Disp:  180 capsule, Rfl: 0  •  glucose blood (Glucose Meter Test) test strip, Use as instructed to check blood glucose levels 3 times daily, Disp: 100 each, Rfl: 5  •  glucose monitor monitoring kit, 1 each As Needed (Check blood sugars 3 times daily PRN.)., Disp: 1 each, Rfl: 0  •  Januvia 50 MG tablet, TAKE 1 TABLET DAILY, Disp: 90 tablet, Rfl: 3  •  Lancets misc, Use as instructed to test blood glucose levels 3 times daily., Disp: 100 each, Rfl: 5  •  linaclotide (LINZESS) 145 MCG capsule capsule, Take 1 capsule by mouth Daily., Disp: 90 capsule, Rfl: 3  •  losartan (COZAAR) 25 MG tablet, Take 1 tablet by mouth Daily., Disp: , Rfl:   •  meloxicam (MOBIC) 15 MG tablet, Take 1 tablet by mouth Daily., Disp: 90 tablet, Rfl: 1  •  metFORMIN (GLUCOPHAGE) 1000 MG tablet, TAKE 1 TABLET TWICE A DAY WITH MEALS, Disp: 60 tablet, Rfl: 11  •  methocarbamol (ROBAXIN) 750 MG tablet, , Disp: , Rfl:   •  metoclopramide (Reglan) 10 MG tablet, Take 1 tablet by mouth 4 (Four) Times a Day Before Meals & at Bedtime As Needed (heartburn)., Disp: 60 tablet, Rfl: 0  •  montelukast (SINGULAIR) 10 MG tablet, Take 1 tablet by mouth Daily., Disp: 90 tablet, Rfl: 3  •  pantoprazole (PROTONIX) 40 MG EC tablet, , Disp: , Rfl:   •  prazosin (MINIPRESS) 1 MG capsule, Take 1 capsule by mouth Every Night., Disp: 90 capsule, Rfl: 0  •  propranolol (INDERAL) 20 MG tablet, Take 1/2 to 1 tablet by mouth 3 times a day as needed for anxiety., Disp: 60 tablet, Rfl: 2  •  QUEtiapine (SEROquel) 25 MG tablet, Take 1-2 tablets by mouth At Night As Needed (sleep)., Disp: 180 tablet, Rfl: 0  •  simvastatin (ZOCOR) 20 MG tablet, Take 1 tablet by mouth Daily., Disp: 90 tablet, Rfl: 1  •  sucralfate (CARAFATE) 1 g tablet, Take 4 tablets by mouth Daily., Disp: , Rfl:   •  nabumetone (RELAFEN) 500 MG tablet, , Disp: , Rfl:     Current Facility-Administered Medications:   •  triamcinolone acetonide (KENALOG-40) injection 40 mg, 40 mg, Intramuscular, Once, Rusty Orourke,  "BHARAT Patel    ALLERGIES  No Known Allergies    FAMILY HISTORY:  Family History   Adopted: Yes   Problem Relation Age of Onset   • Mental illness Other    • Schizophrenia Son        SOCIAL HISTORY  Social History     Socioeconomic History   • Marital status:    Tobacco Use   • Smoking status: Former Smoker     Packs/day: 0.50     Start date: 9/22/1987     Quit date: 2/1/2018     Years since quitting: 3.8   • Smokeless tobacco: Never Used   Vaping Use   • Vaping Use: Never used   Substance and Sexual Activity   • Alcohol use: No   • Drug use: No   • Sexual activity: Defer           PHYSICAL EXAM   BP (!) 129/101 (BP Location: Left arm, Patient Position: Sitting, Cuff Size: Adult)   Pulse 88   Temp 97.3 °F (36.3 °C) (Temporal)   Ht 152.4 cm (60\")   Wt 70.5 kg (155 lb 6.4 oz)   BMI 30.35 kg/m²   Physical Exam  Constitutional:       General: She is not in acute distress.     Appearance: She is well-developed.   HENT:      Head: Normocephalic and atraumatic.      Nose: Nose normal.   Eyes:      Conjunctiva/sclera: Conjunctivae normal.      Pupils: Pupils are equal, round, and reactive to light.   Pulmonary:      Effort: Pulmonary effort is normal.   Abdominal:      General: There is no distension.      Palpations: Abdomen is soft.   Neurological:      Mental Status: She is alert and oriented to person, place, and time.   Psychiatric:         Behavior: Behavior normal.         Judgment: Judgment normal.           Results Review:    Clinical Information    Dysphagia, heartburn, diaphragmatic hernia without obstruction or gangrene   Final Diagnosis   ESOPHAGUS, DISTAL, BIOPSY:                Gastroesophageal type mucosa with reactive epithelial changes and underlying mixed inflammatory infiltrate                 Negative for eosinophilic esophagitis (0-1 eosinophils/hpf)                Negative for intestinal metaplasia, dysplasia and malignancy   Electronically signed by Aneudy Landaverde MD on 11/23/2021 at " 0857       ASSESSMENT / PLAN  1.  GERD  2.  Dyspepsia  -Pantoprazole 40 mg twice daily  -FODMAP diet    Return if symptoms worsen or fail to improve.    I discussed the patients findings and my recommendations with patient    BHARAT Cruz

## 2022-01-24 ENCOUNTER — TELEMEDICINE (OUTPATIENT)
Dept: PSYCHIATRY | Facility: CLINIC | Age: 57
End: 2022-01-24

## 2022-01-24 DIAGNOSIS — G47.9 SLEEPING DIFFICULTIES: ICD-10-CM

## 2022-01-24 DIAGNOSIS — F41.1 GENERALIZED ANXIETY DISORDER: Primary | Chronic | ICD-10-CM

## 2022-01-24 DIAGNOSIS — F51.5 NIGHTMARES: ICD-10-CM

## 2022-01-24 DIAGNOSIS — F41.0 PANIC DISORDER (EPISODIC PAROXYSMAL ANXIETY): ICD-10-CM

## 2022-01-24 DIAGNOSIS — F43.10 POST TRAUMATIC STRESS DISORDER (PTSD): ICD-10-CM

## 2022-01-24 DIAGNOSIS — F33.0 MILD EPISODE OF RECURRENT MAJOR DEPRESSIVE DISORDER: Chronic | ICD-10-CM

## 2022-01-24 PROCEDURE — 99214 OFFICE O/P EST MOD 30 MIN: CPT | Performed by: NURSE PRACTITIONER

## 2022-01-24 RX ORDER — FLUOXETINE HYDROCHLORIDE 40 MG/1
80 CAPSULE ORAL DAILY
Qty: 180 CAPSULE | Refills: 0 | Status: SHIPPED | OUTPATIENT
Start: 2022-01-24 | End: 2022-04-27 | Stop reason: SDUPTHER

## 2022-01-24 RX ORDER — BUSPIRONE HYDROCHLORIDE 15 MG/1
15 TABLET ORAL 3 TIMES DAILY
Qty: 270 TABLET | Refills: 0 | Status: SHIPPED | OUTPATIENT
Start: 2022-01-24 | End: 2022-02-09 | Stop reason: SDUPTHER

## 2022-01-24 RX ORDER — FLUOXETINE HYDROCHLORIDE 40 MG/1
80 CAPSULE ORAL DAILY
Qty: 180 CAPSULE | Refills: 0 | Status: SHIPPED | OUTPATIENT
Start: 2022-01-24 | End: 2022-01-24 | Stop reason: SDUPTHER

## 2022-01-24 RX ORDER — QUETIAPINE FUMARATE 25 MG/1
25-50 TABLET, FILM COATED ORAL NIGHTLY PRN
Qty: 180 TABLET | Refills: 0 | Status: SHIPPED | OUTPATIENT
Start: 2022-01-24 | End: 2022-04-25

## 2022-01-24 RX ORDER — PRAZOSIN HYDROCHLORIDE 1 MG/1
1 CAPSULE ORAL NIGHTLY
Qty: 90 CAPSULE | Refills: 0 | Status: SHIPPED | OUTPATIENT
Start: 2022-01-24 | End: 2022-04-27 | Stop reason: SDUPTHER

## 2022-01-24 RX ORDER — BUSPIRONE HYDROCHLORIDE 15 MG/1
15 TABLET ORAL 3 TIMES DAILY
Qty: 270 TABLET | Refills: 0 | Status: SHIPPED | OUTPATIENT
Start: 2022-01-24 | End: 2022-01-24 | Stop reason: SDUPTHER

## 2022-01-24 NOTE — PROGRESS NOTES
"This provider is located at the [facility] (through [facility]), [address], [facility], [address] using a secure [employer] Video Visit through [employer]. Patient is being seen remotely via telehealth at their home address in [place], and stated they are in a secure environment for this session. The patient's condition being diagnosed/treated is appropriate for telemedicine. The provider identified herself as well as her credentials. The patient, and/or patients guardian, consent to be seen remotely, and when consent is given they understand that the consent allows for patient identifiable information to be sent to a third party as needed. They may refuse to be seen remotely at any time. The electronic data is encrypted and password protected, and the patient and/or guardian has been advised of the potential risks to privacy not withstanding such measures.    You have chosen to receive care through a telehealth visit.  Do you consent to use a video/audio connection for your medical care today? Yes     Subjective   Mallika Brewster is a 57 y.o. female who is here today for medication management follow up.    Chief Complaint: Depression, anxiety, PTSD, nightmares, sleeping difficulties    History of Present Illness:  The patient describes her mood as \"good\" over the last few weeks.  Patient states that she has been doing well over the past few weeks.  The patient states that she feels that her symptoms have continued to be very well controlled with her current psychiatric medication regimen.  The patient states that she is pleased with current management symptoms and endorses that she would like to continue her current psychiatric medication regimen without any changes today.  The patient reports her appetite as good.  The patient reports her sleep as good.  The patient states that she will occasionally have a bad dream, but states that this is very infrequent and that this " has continued to be well-controlled with the Prazosin.  The patient denies any new medical problems or changes in medications since last appointment with this facility.  The patient reports compliance with current medication regimen.  The patient denies any current side effects from her current medication regimen.  The patient denies any abnormal muscle movements or tics.  The patient rates her depression at a 3-4/10 on a 0-10 scale, with 10 being the worst.  The patient rates her anxiety at a 5/10 on a 0-10 scale, with 10 being the worst.  The patient rates hopelessness at a 0/10 on a 0-10 scale with 10 being the worst.  The patient would like to not adjust or change her medications at this visit.  The patient denies suicidal ideations and homicidal ideations, and is convincing.  The patient denies any auditory hallucinations or visual hallucinations.  The patient does not endorse significant symptoms consistent with bipolar disorder or a psychotic illness.                  The patient denies any chance of pregnancy at this time.  The patient was educated that her prescribed medications can have potential risk to a developing fetus. The patient is advised to contact this APRN/this office if she becomes pregnant or plans to become pregnant.  Pt verbalizes understanding and acknowledged agreement with this plan in her own words.        The following portions of the patient's history were reviewed and updated as appropriate: allergies, current medications, past family history, past medical history, past social history, past surgical history and problem list.    Review of Systems   Constitutional: Negative for activity change, appetite change, fatigue and unexpected weight change.   Psychiatric/Behavioral: Positive for dysphoric mood. Negative for agitation, behavioral problems, confusion, decreased concentration, hallucinations, self-injury, sleep disturbance and suicidal ideas. The patient is nervous/anxious. The  patient is not hyperactive.        Objective   Physical Exam  Constitutional:       Appearance: Normal appearance.   Neurological:      Mental Status: She is alert.   Psychiatric:         Attention and Perception: Attention and perception normal.         Mood and Affect: Affect normal. Mood is anxious.         Speech: Speech normal.         Behavior: Behavior normal. Behavior is cooperative.         Thought Content: Thought content normal. Thought content does not include homicidal or suicidal ideation. Thought content does not include homicidal or suicidal plan.         Cognition and Memory: Cognition and memory normal.         Judgment: Judgment normal.       not currently breastfeeding.    The patient was seen remotely today via a MyChart Video Visit through [facility].  Unable to obtain vital signs due to nature of remote visit.  Height stated at 60 inches.  Weight stated at 155 pounds.     No Known Allergies    Recent Results (from the past 2016 hour(s))   POCT glycated hemoglobin, total    Collection Time: 11/09/21  9:01 AM    Specimen: Urine   Result Value Ref Range    Hemoglobin A1C 6.9 %   COVID-19, APTIMA PANTHER EDIN IN-HOUSE NP/OP SWAB IN UTM/VTM/SAL[facility] TRANSPORT MEDIA 24HR TAT - Swab, Nasopharynx    Collection Time: 11/17/21 10:39 AM    Specimen: Nasopharynx; Swab   Result Value Ref Range    COVID19 Not Detected Not Detected - Ref. Range   Tissue Pathology Exam    Collection Time: 11/19/21  2:45 PM    Specimen: Esophagus; Tissue   Result Value Ref Range    Case Report       Surgical Pathology Report                         Case: [id number]                                  Authorizing Provider:  Axel Saab     Collected:           11/19/2021 02:45 PM                                 MD Artur                                                                    Ordering Location:     [facility]   Received:            11/22/2021 09:08 AM                                 LABORATORY                                                                    Pathologist:           Aneudy Landaverde MD                                                           Specimen:    Esophagus                                                                                  Clinical Information       Dysphagia, heartburn, diaphragmatic hernia without obstruction or gangrene      Final Diagnosis       ESOPHAGUS, DISTAL, BIOPSY:                Gastroesophageal type mucosa with reactive epithelial changes and underlying mixed inflammatory infiltrate                 Negative for eosinophilic esophagitis (0-1 eosinophils/hpf)                Negative for intestinal metaplasia, dysplasia and malignancy      Gross Description          Specimen 1 received in formalin labeled esophagus are multiple white soft tissue fragments aggregating 1 x 0.8 x 0.2 cm submitted entirely in a single cassette.  HM      Microscopic Description       The slides are reviewed and demonstrate histopathologic features supporting the above rendered diagnosis.           Current Medications:   Current Outpatient Medications   Medication Sig Dispense Refill   • betamethasone valerate (VALISONE) 0.1 % ointment Apply  topically to the appropriate area as directed 2 (Two) Times a Day. 45 g 2   • busPIRone (BUSPAR) 15 MG tablet Take 1 tablet by mouth 3 (Three) Times a Day. 270 tablet 0   • famotidine (PEPCID) 40 MG tablet TAKE 1 TABLET TWICE A DAY AS NEEDED FOR HEARTBURN 60 tablet 11   • FLUoxetine (PROzac) 40 MG capsule Take 2 capsules by mouth Daily. 180 capsule 0   • glucose blood (Glucose Meter Test) test strip Use as instructed to check blood glucose levels 3 times daily 100 each 5   • glucose monitor monitoring kit 1 each As Needed (Check blood sugars 3 times daily PRN.). 1 each 0   • Januvia 50 MG tablet TAKE 1 TABLET DAILY 90 tablet 3   • Lancets misc Use as instructed to test blood glucose levels 3 times daily. 100 each 5   • linaclotide (LINZESS) 145 MCG  capsule capsule Take 1 capsule by mouth Daily. 90 capsule 3   • losartan (COZAAR) 25 MG tablet Take 1 tablet by mouth Daily.     • meloxicam (MOBIC) 15 MG tablet Take 1 tablet by mouth Daily. 90 tablet 1   • metFORMIN (GLUCOPHAGE) 1000 MG tablet TAKE 1 TABLET TWICE A DAY WITH MEALS 60 tablet 11   • methocarbamol (ROBAXIN) 750 MG tablet      • metoclopramide (Reglan) 10 MG tablet Take 1 tablet by mouth 4 (Four) Times a Day Before Meals & at Bedtime As Needed (heartburn). 60 tablet 0   • montelukast (SINGULAIR) 10 MG tablet Take 1 tablet by mouth Daily. 90 tablet 3   • nabumetone (RELAFEN) 500 MG tablet      • pantoprazole (PROTONIX) 40 MG EC tablet      • prazosin (MINIPRESS) 1 MG capsule Take 1 capsule by mouth Every Night. 90 capsule 0   • propranolol (INDERAL) 20 MG tablet Take 1/2 to 1 tablet by mouth 3 times a day as needed for anxiety. 60 tablet 2   • QUEtiapine (SEROquel) 25 MG tablet Take 1-2 tablets by mouth At Night As Needed (sleep). 180 tablet 0   • simvastatin (ZOCOR) 20 MG tablet Take 1 tablet by mouth Daily. 90 tablet 1   • sucralfate (CARAFATE) 1 g tablet Take 4 tablets by mouth Daily.       Current Facility-Administered Medications   Medication Dose Route Frequency Provider Last Rate Last Admin   • triamcinolone acetonide (KENALOG-40) injection 40 mg  40 mg Intramuscular Once Amanda Katz APRN           Mental Status Exam:   Hygiene:   good  Cooperation:  Cooperative  Eye Contact:  Good  Psychomotor Behavior:  Appropriate  Affect:  Full range  Hopelessness: Denies  Speech:  Normal  Thought Process:  Goal directed  Thought Content:  Normal  Suicidal:  None  Homicidal:  None  Hallucinations:  None  Delusion:  None  Memory:  Intact  Orientation:  Person, Place, Time and Situation  Reliability:  good  Insight:  Good  Judgement:  Good  Impulse Control:  Good  Physical/Medical Issues:  Yes See medical history      PHQ-2 Depression Screening  Little interest or pleasure in doing things? 1    Feeling down, depressed, or hopeless? 1   PHQ-2 Total Score 2         Assessment/Plan   Diagnoses and all orders for this visit:    1. Generalized anxiety disorder (Primary)  -     busPIRone (BUSPAR) 15 MG tablet; Take 1 tablet by mouth 3 (Three) Times a Day.  Dispense: 270 tablet; Refill: 0  -     FLUoxetine (PROzac) 40 MG capsule; Take 2 capsules by mouth Daily.  Dispense: 180 capsule; Refill: 0    2. Panic disorder (episodic paroxysmal anxiety)  -     busPIRone (BUSPAR) 15 MG tablet; Take 1 tablet by mouth 3 (Three) Times a Day.  Dispense: 270 tablet; Refill: 0  -     FLUoxetine (PROzac) 40 MG capsule; Take 2 capsules by mouth Daily.  Dispense: 180 capsule; Refill: 0    3. Post traumatic stress disorder (PTSD)  -     busPIRone (BUSPAR) 15 MG tablet; Take 1 tablet by mouth 3 (Three) Times a Day.  Dispense: 270 tablet; Refill: 0  -     FLUoxetine (PROzac) 40 MG capsule; Take 2 capsules by mouth Daily.  Dispense: 180 capsule; Refill: 0  -     prazosin (MINIPRESS) 1 MG capsule; Take 1 capsule by mouth Every Night.  Dispense: 90 capsule; Refill: 0    4. Mild episode of recurrent major depressive disorder (HCC)  -     busPIRone (BUSPAR) 15 MG tablet; Take 1 tablet by mouth 3 (Three) Times a Day.  Dispense: 270 tablet; Refill: 0  -     FLUoxetine (PROzac) 40 MG capsule; Take 2 capsules by mouth Daily.  Dispense: 180 capsule; Refill: 0    5. Sleeping difficulties  -     prazosin (MINIPRESS) 1 MG capsule; Take 1 capsule by mouth Every Night.  Dispense: 90 capsule; Refill: 0  -     QUEtiapine (SEROquel) 25 MG tablet; Take 1-2 tablets by mouth At Night As Needed (sleep).  Dispense: 180 tablet; Refill: 0    6. Nightmares  -     prazosin (MINIPRESS) 1 MG capsule; Take 1 capsule by mouth Every Night.  Dispense: 90 capsule; Refill: 0            Visit Diagnoses:    ICD-10-CM ICD-9-CM   1. Generalized anxiety disorder  F41.1 300.02   2. Panic disorder (episodic paroxysmal anxiety)  F41.0 300.01   3. Post traumatic stress  disorder (PTSD)  F43.10 309.81   4. Mild episode of recurrent major depressive disorder (HCC)  F33.0 296.31   5. Sleeping difficulties  G47.9 780.50   6. Nightmares  F51.5 307.47       GOALS:  Short Term Goals: Patient will be compliant with medication, and patient will have no significant medication related side effects.  Patient will be engaged in psychotherapy as indicated.  Patient will report subjective improvement of symptoms.  Long term goals: To stabilize mood and treat/improve subjective symptoms, the patient will stay out of the hospital, the patient will be at an optimal level of functioning, and the patient will take all medications as prescribed.  The patient verbalized understanding and agreement with goals that were mutually set.      SUICIDE RISK ASSESSMENT: Unalterable demographics and a history of mental health intervention indicate this patient is in a high risk category compared to the general population. At present, the patient denies active SI/HI, intentions, or plans at this time and agrees to seek immediate care should such thoughts develop. The patient verbalizes understanding of how to access emergency care if needed and agrees to do so. Consideration of suicide risk and protective factors such as history, current presentation, individual strengths and weaknesses, psychosocial and environmental stressors and variables, psychiatric illness and symptoms, medical conditions and pain, took place in this interview. Based on those considerations, the patient is determined: within individual baseline and presenting no imminent risk for suicide or homicide. Other recommendations: The patient does not meet the criteria for inpatient admission and is not a safety risk to self or others at today's visit. Inpatient treatment offers no significant advantages over outpatient treatment for this patient at today's visit.      SAFETY PLAN:  Patient was given ample time for questions and fully participated  in treatment planning.  Patient was encouraged to call the clinic with any questions or concerns.  Patient was informed of access to emergency care. If patient were to develop any significant symptomatology, suicidal ideation, homicidal ideation, any concerns, or feel unsafe at any time they are to call the clinic and if unable to get immediate assistance should immediately call 911 or go to the nearest emergency room.  The patient is advised to remove or secure (lock away) all lethal weapons (including guns) and sharps (including razors, scissors, knives, etc.).  All medications (including any prescribed and any over the counter medications) should be stored in a safe and secured location that is not obtainable by children/adolescents.  Patient was given an opportunity and encouraged to ask questions about their medication, illness, and treatment. Patient contracted verbally for the following: If you are experiencing an emotional crisis or have thoughts of harming yourself or others, please go to your nearest local emergency room or call 911. Will continue to re-assess medication response and side effects frequently to establish efficacy and ensure safety. Risks, any black box warnings, side effects, off label usage, and benefits of medication and treatment discussed with patient, along with potential adverse side effects of current and/or newly prescribed medication, alternative treatment options, and OTC medications.  Patient verbalized understanding of potential risks, any off label use of medication, any black box warnings, and any side effects in their own words. The patient verbalized understanding and agreed to comply with the safety plan discussed in their own words.  Patient given the number to the office. Number also available to the 24- hour suicide hotline.      TREATMENT PLAN/GOALS: Continue medications and treatment plan as indicated. Treatment and medication options discussed during today's visit.  Patient acknowledged and verbally consented to continue with current treatment plan and was educated on the importance of compliance with treatment and follow-up appointments.        -Continue Prozac 40 mg daily for anxiety, depression, and PTSD  -Continue Buspar 15 mg three time daily as adjunct for anxiety and depression  -Continue Prazosin 1 mg nightly for nightmares/sleeping difficulties  -Continue Seroquel 25-50 mg nightly as needed for sleeping difficulties  -Continue Propanolol 10-20 mg three times daily as needed for anxiety/panic        MEDICATION ISSUES: Discussed medication options and treatment plan of prescribed medication, any off label use of medication, as well as the risks, benefits, any black box warnings including increased suicidality, and side effects including but not limited to potential falls, dizziness, possible impaired driving, GI side effects (change in appetite, abdominal discomfort, nausea, vomiting, diarrhea, and/or constipation), dry mouth, somnolence, sedation, insomnia, activation, agitation, irritation, tremors, abnormal muscle movements or disorders, tardive dyskinesia, akathisia, asthenia, headache, sweating, possible bruising or rare bleeding, electrolyte and/or fluid abnormalities, change in blood pressure/heart rate/and or heart rhythm, hypotension, sexual dysfunction, rare impulse control problems, rare seizures, rare neuroleptic malignant syndrome, increased risk of death and cerebrovascular events, change in blood glucose and increased risk for diabetes, change in triglycerides and cholesterol and increased risk for dyslipidemia,  weight gain, weight gain that can become problematic to health, skin conditions and reactions, and metabolic adversities among others. Patient and/or guardian are agreeable to call the office with any worsening of symptoms or onset of side effects, or if any concerns or questions arise.  The contact information for the office is made available to  the patient and/or guardian. Patient and/or guardian are agreeable to call 911 or go to the nearest ER should they begin having any SI/HI, or if any urgent concerns arise. No medication side effects or related complaints today.    This APRN has discussed with the patient/guardian about the possibility of serotonin syndrome when certain medications are taken together, as is the case with this patient.  This APRN has provided the patient/guardian with a list of symptoms of serotonin syndrome including symptoms of autonomic instability, altered sensorium, confusion, restlessness, agitation, myoclonus, hyperreflexia, hyperthermia, diaphoresis, tremor, chills, diarrhea and cramps, ataxia, headache, migraines, seizures, and insomnia; which could lead to permanent hyperthermic brain damage, cardiovascular collapse, coma, or even death.  The patient/guardian are instructed to stop medications immediately and either contact this APRN/this office during regular office hours, or go to the emergency department/call 911, if they begin to experience any of the symptoms discussed.  The benefits and risks of the current medication regimen are discussed with the patient/guardian, and they feel that the benefits out weigh the risks.  The patient/guardian verbalized understanding and agreement in their own words.                    [facility] No Show Policy:  We understand unexpected circumstances arise; however, anytime you miss your appointment we are unable to provide you appropriate care.  In addition, each appointment missed could have been used to provide care for others.  We ask that you call at least 24 hours in advance to cancel or reschedule an appointment.  We would like to take this opportunity to remind you of our policy stating patients who miss THREE or more appointments without cancelling or rescheduling 24 hours in advance of the appointment may be subject to cancellation of any further visits  with our clinic and recommendation to seek in-person services/visits.    Please call [phone] to reschedule your appointment. If there are reasons that make it difficult for you to keep the appointments, please call and let us know how we can help.  Please understand that medication prescribing will not continue without seeing your provider.      [facility]'s No Show Policy reviewed with patient at today's visit. Patient verbalized understanding of this policy. Discussed with patient that in the event that there are three or more no show visits, it will be recommended that they pursue in-person services/visits as noncompliance with telehealth visits indicates that patient is not an appropriate candidate for telemedicine and would likely be more appropriate for in-person services/visits. Patient verbalizes understanding and is agreeable to this.          MEDS ORDERED DURING VISIT:  New Medications Ordered This Visit   Medications   • busPIRone (BUSPAR) 15 MG tablet     Sig: Take 1 tablet by mouth 3 (Three) Times a Day.     Dispense:  270 tablet     Refill:  0   • FLUoxetine (PROzac) 40 MG capsule     Sig: Take 2 capsules by mouth Daily.     Dispense:  180 capsule     Refill:  0   • prazosin (MINIPRESS) 1 MG capsule     Sig: Take 1 capsule by mouth Every Night.     Dispense:  90 capsule     Refill:  0   • QUEtiapine (SEROquel) 25 MG tablet     Sig: Take 1-2 tablets by mouth At Night As Needed (sleep).     Dispense:  180 tablet     Refill:  0       Return in about 3 months (around 4/24/2022), or if symptoms worsen or fail to improve, for Recheck.        Patient will follow-up in 3 months, highly encouraged the patient if she had any questions or concerns to contact the behavioral health virtual clinic for sooner appointment patient verbalized understanding.      Functional Status: Mild impairment     Prognosis: Good with Ongoing Treatment             This document has been electronically  signed by BHARAT Partida  January 24, 2022 10:21 EST       Some of the data in this electronic note has been brought forward from a previous encounter, any necessary changes have been made, it has been reviewed by this APRN, and it is accurate.      Part of this note may be an electronic transcription/translation of spoken language to printed text using the Dragon Dictation System.

## 2022-02-02 DIAGNOSIS — M54.6 CHRONIC BILATERAL THORACIC BACK PAIN: ICD-10-CM

## 2022-02-02 DIAGNOSIS — G89.29 CHRONIC BILATERAL THORACIC BACK PAIN: ICD-10-CM

## 2022-02-02 DIAGNOSIS — E78.5 HYPERLIPIDEMIA, UNSPECIFIED HYPERLIPIDEMIA TYPE: ICD-10-CM

## 2022-02-02 RX ORDER — SIMVASTATIN 20 MG
TABLET ORAL
Qty: 90 TABLET | Refills: 3 | Status: SHIPPED | OUTPATIENT
Start: 2022-02-02

## 2022-02-03 RX ORDER — MELOXICAM 15 MG/1
TABLET ORAL
Qty: 90 TABLET | Refills: 3 | Status: SHIPPED | OUTPATIENT
Start: 2022-02-03

## 2022-02-09 DIAGNOSIS — F43.10 POST TRAUMATIC STRESS DISORDER (PTSD): ICD-10-CM

## 2022-02-09 DIAGNOSIS — I10 ESSENTIAL HYPERTENSION: ICD-10-CM

## 2022-02-09 DIAGNOSIS — F41.1 GENERALIZED ANXIETY DISORDER: Chronic | ICD-10-CM

## 2022-02-09 DIAGNOSIS — F33.0 MILD EPISODE OF RECURRENT MAJOR DEPRESSIVE DISORDER: Chronic | ICD-10-CM

## 2022-02-09 DIAGNOSIS — F41.0 PANIC DISORDER (EPISODIC PAROXYSMAL ANXIETY): ICD-10-CM

## 2022-02-09 RX ORDER — BUSPIRONE HYDROCHLORIDE 15 MG/1
15 TABLET ORAL 3 TIMES DAILY
Qty: 270 TABLET | Refills: 0 | Status: SHIPPED | OUTPATIENT
Start: 2022-02-09

## 2022-02-09 RX ORDER — LOSARTAN POTASSIUM 25 MG/1
25 TABLET ORAL DAILY
Qty: 90 TABLET | Refills: 1 | Status: SHIPPED | OUTPATIENT
Start: 2022-02-09 | End: 2022-03-02 | Stop reason: SDUPTHER

## 2022-03-02 DIAGNOSIS — I10 ESSENTIAL HYPERTENSION: ICD-10-CM

## 2022-03-02 RX ORDER — LOSARTAN POTASSIUM 25 MG/1
25 TABLET ORAL DAILY
Qty: 90 TABLET | Refills: 1 | Status: SHIPPED | OUTPATIENT
Start: 2022-03-02

## 2022-03-11 ENCOUNTER — HOSPITAL ENCOUNTER (OUTPATIENT)
Dept: GENERAL RADIOLOGY | Facility: HOSPITAL | Age: 57
Discharge: HOME | End: 2022-03-11
Admitting: NURSE PRACTITIONER

## 2022-03-11 ENCOUNTER — OFFICE VISIT (OUTPATIENT)
Dept: INTERNAL MEDICINE | Facility: CLINIC | Age: 57
End: 2022-03-11

## 2022-03-11 VITALS
WEIGHT: 150 LBS | DIASTOLIC BLOOD PRESSURE: 82 MMHG | RESPIRATION RATE: 16 BRPM | TEMPERATURE: 98.4 F | BODY MASS INDEX: 29.45 KG/M2 | OXYGEN SATURATION: 98 % | SYSTOLIC BLOOD PRESSURE: 122 MMHG | HEIGHT: 60 IN | HEART RATE: 94 BPM

## 2022-03-11 DIAGNOSIS — G89.29 CHRONIC RIGHT EAR PAIN: ICD-10-CM

## 2022-03-11 DIAGNOSIS — M25.559 HIP PAIN: ICD-10-CM

## 2022-03-11 DIAGNOSIS — G89.29 CHRONIC PAIN OF RIGHT KNEE: ICD-10-CM

## 2022-03-11 DIAGNOSIS — E11.9 TYPE 2 DIABETES MELLITUS WITHOUT COMPLICATION, WITHOUT LONG-TERM CURRENT USE OF INSULIN: Primary | ICD-10-CM

## 2022-03-11 DIAGNOSIS — G89.29 CHRONIC BILATERAL LOW BACK PAIN WITH BILATERAL SCIATICA: ICD-10-CM

## 2022-03-11 DIAGNOSIS — M54.41 CHRONIC BILATERAL LOW BACK PAIN WITH BILATERAL SCIATICA: ICD-10-CM

## 2022-03-11 DIAGNOSIS — M25.561 CHRONIC PAIN OF RIGHT KNEE: ICD-10-CM

## 2022-03-11 DIAGNOSIS — M54.42 CHRONIC BILATERAL LOW BACK PAIN WITH BILATERAL SCIATICA: ICD-10-CM

## 2022-03-11 DIAGNOSIS — I10 ESSENTIAL HYPERTENSION: ICD-10-CM

## 2022-03-11 DIAGNOSIS — H92.01 CHRONIC RIGHT EAR PAIN: ICD-10-CM

## 2022-03-11 LAB — HBA1C MFR BLD: 7 %

## 2022-03-11 PROCEDURE — 83036 HEMOGLOBIN GLYCOSYLATED A1C: CPT | Performed by: NURSE PRACTITIONER

## 2022-03-11 PROCEDURE — 73562 X-RAY EXAM OF KNEE 3: CPT

## 2022-03-11 PROCEDURE — 99214 OFFICE O/P EST MOD 30 MIN: CPT | Performed by: NURSE PRACTITIONER

## 2022-03-11 PROCEDURE — 73522 X-RAY EXAM HIPS BI 3-4 VIEWS: CPT

## 2022-03-11 PROCEDURE — 72110 X-RAY EXAM L-2 SPINE 4/>VWS: CPT

## 2022-03-11 NOTE — PROGRESS NOTES
Subjective   Chief Complaint   Patient presents with   • Hip Pain     Bilateral     • Knee Pain     Right       • Diabetes      Mallika Brewster is a 57 y.o. female here today for hypertension, diabetes, knee pain, and hip pain. Blood pressure is stable and at goal. No shortness of breath or chest pain. Blood sugar is doing well. A1C is 7 today. Is she is working to improve diet and physical activity. Had breast reduction about 3 months ago and having some new onset tenderness and firmness under left breast. Has contacted plastic surgeon's office to schedule appt. Right knee pain has worsened. If she hits her knee on anything pain is severe and takes a couple of days to improve. Has some discomfort with walking. Has low back pain that radiates into her hips and legs but also has isolated hip pain within the joint that worsens with walking. Meloxicam and relafen help some with pain.     I have reviewed the following portions of the patient's history and confirmed they are accurate: allergies, current medications, past family history, past medical history, past social history, past surgical history and problem list    I have personally completed the patient's review of systems.    Review of Systems   Constitutional: Positive for fatigue. Negative for activity change, appetite change, chills, diaphoresis, fever, unexpected weight gain and unexpected weight loss.   HENT: Negative for congestion, ear discharge, ear pain, mouth sores, nosebleeds, sinus pressure, sneezing and sore throat.    Eyes: Negative for pain, discharge and itching.   Respiratory: Negative for cough, chest tightness, shortness of breath and wheezing.    Cardiovascular: Negative for chest pain, palpitations and leg swelling.   Gastrointestinal: Negative for abdominal pain, constipation, diarrhea, nausea, vomiting, GERD and indigestion.        Colonoscopy - 2017 - normal   Endocrine: Negative for heat intolerance, polydipsia, polyphagia and polyuria.    Genitourinary: Negative for dysuria, flank pain, frequency, hematuria and urgency.   Musculoskeletal: Positive for arthralgias, back pain and myalgias. Negative for gait problem, joint swelling, neck pain and neck stiffness.   Skin: Negative for color change, pallor and rash.   Allergic/Immunologic: Negative for environmental allergies and immunocompromised state.   Neurological: Negative for seizures, speech difficulty, weakness and numbness.   Hematological: Negative for adenopathy.   Psychiatric/Behavioral: Positive for stress. Negative for agitation, decreased concentration, sleep disturbance, suicidal ideas and depressed mood. The patient is nervous/anxious.        Current Outpatient Medications on File Prior to Visit   Medication Sig   • betamethasone valerate (VALISONE) 0.1 % ointment Apply  topically to the appropriate area as directed 2 (Two) Times a Day.   • busPIRone (BUSPAR) 15 MG tablet Take 1 tablet by mouth 3 (Three) Times a Day.   • famotidine (PEPCID) 40 MG tablet TAKE 1 TABLET TWICE A DAY AS NEEDED FOR HEARTBURN   • FLUoxetine (PROzac) 40 MG capsule Take 2 capsules by mouth Daily.   • glucose blood (Glucose Meter Test) test strip Use as instructed to check blood glucose levels 3 times daily   • glucose monitor monitoring kit 1 each As Needed (Check blood sugars 3 times daily PRN.).   • Januvia 50 MG tablet TAKE 1 TABLET DAILY   • Lancets misc Use as instructed to test blood glucose levels 3 times daily.   • linaclotide (LINZESS) 145 MCG capsule capsule Take 1 capsule by mouth Daily.   • losartan (COZAAR) 25 MG tablet Take 1 tablet by mouth Daily.   • meloxicam (MOBIC) 15 MG tablet TAKE 1 TABLET DAILY   • metFORMIN (GLUCOPHAGE) 1000 MG tablet TAKE 1 TABLET TWICE A DAY WITH MEALS   • methocarbamol (ROBAXIN) 750 MG tablet    • metoclopramide (Reglan) 10 MG tablet Take 1 tablet by mouth 4 (Four) Times a Day Before Meals & at Bedtime As Needed (heartburn).   • montelukast (SINGULAIR) 10 MG tablet Take  "1 tablet by mouth Daily.   • nabumetone (RELAFEN) 500 MG tablet    • pantoprazole (PROTONIX) 40 MG EC tablet    • prazosin (MINIPRESS) 1 MG capsule Take 1 capsule by mouth Every Night.   • propranolol (INDERAL) 20 MG tablet Take 1/2 to 1 tablet by mouth 3 times a day as needed for anxiety.   • QUEtiapine (SEROquel) 25 MG tablet Take 1-2 tablets by mouth At Night As Needed (sleep).   • simvastatin (ZOCOR) 20 MG tablet TAKE 1 TABLET DAILY   • sucralfate (CARAFATE) 1 g tablet Take 4 tablets by mouth Daily.     Current Facility-Administered Medications on File Prior to Visit   Medication   • triamcinolone acetonide (KENALOG-40) injection 40 mg       Objective   Vitals:    03/11/22 0907   BP: 122/82   Pulse: 94   Resp: 16   Temp: 98.4 °F (36.9 °C)   TempSrc: Temporal   SpO2: 98%   Weight: 68 kg (150 lb)   Height: 152.4 cm (60\")   PainSc:   6   PainLoc: Hip  Comment: bilateral     Body mass index is 29.29 kg/m².    Physical Exam  Vitals reviewed.   Constitutional:       Appearance: Normal appearance. She is well-developed.   HENT:      Head: Normocephalic and atraumatic.      Nose: Nose normal.   Eyes:      General: Lids are normal.      Conjunctiva/sclera: Conjunctivae normal.      Pupils: Pupils are equal, round, and reactive to light.   Neck:      Thyroid: No thyromegaly.      Trachea: Trachea normal.   Cardiovascular:      Rate and Rhythm: Normal rate and regular rhythm.      Heart sounds: Normal heart sounds.   Pulmonary:      Effort: Pulmonary effort is normal. No respiratory distress.      Breath sounds: Normal breath sounds.   Musculoskeletal:      Right knee: Swelling present. Tenderness present.   Skin:     General: Skin is warm and dry.   Neurological:      Mental Status: She is alert and oriented to person, place, and time.      GCS: GCS eye subscore is 4. GCS verbal subscore is 5. GCS motor subscore is 6.   Psychiatric:         Attention and Perception: Attention normal.         Mood and Affect: Mood normal.  "        Speech: Speech normal.         Behavior: Behavior normal. Behavior is cooperative.         Thought Content: Thought content normal.         Assessment/Plan   Problem List Items Addressed This Visit        Cardiac and Vasculature    Essential hypertension  Chronic issue stable requiring medication management and monitoring. Will eat well balanced heart healthy diet, drink adequate water, increase physical activity, and get adequate rest. Monitor blood pressures daily and contact office for any readings consistently above 140/90. Patient will report any associated symptoms such as headaches, blurry vision, or nausea. Patient will go to ER for any chest pressure or chest pain.   Continue losartan.        Other Visit Diagnoses     Type 2 diabetes mellitus without complication, without long-term current use of insulin (HCC)    -  Primary  Chronic issue stable requiring medication management and monitoring. Will eat well balanced heart healthy diabetic diet, drink adequate water, increase physical activity, and get adequate rest. Will monitor blood sugars daily and keep log for next appt. Will contact office earlier if blood sugars are averaging above 250.   Continue januvia and metformin      Relevant Orders    POCT glycated hemoglobin, total (Completed)    Chronic pain of right knee      Chronic issue unstable requiring medication management and monitoring. Will eat well balanced diet, increase water intake, increase physical activity as tolerated, and get adequate rest. Can use warmth or ice for discomfort in this area. Discussed stretching exercises.   Continue meloxicam.      Relevant Orders    XR Knee 3 View Right (Completed)    Chronic bilateral low back pain with bilateral sciatica      Chronic issue unstable requiring medication management and monitoring. Will eat well balanced diet, increase water intake, increase physical activity as tolerated, and get adequate rest. Can use warmth or ice for discomfort  in this area. Discussed stretching exercises.   Continue meloxicam.      Relevant Orders    XR Spine Lumbar Complete 4+VW (Completed)    Hip pain      Chronic issue unstable requiring medication management and monitoring. Will eat well balanced diet, increase water intake, increase physical activity as tolerated, and get adequate rest. Can use warmth or ice for discomfort in this area. Discussed stretching exercises.   Continue meloxicam.      Relevant Orders    XR Hips Bilateral With or Without Pelvis 3-4 View (Completed)             Current Outpatient Medications:   •  betamethasone valerate (VALISONE) 0.1 % ointment, Apply  topically to the appropriate area as directed 2 (Two) Times a Day., Disp: 45 g, Rfl: 2  •  busPIRone (BUSPAR) 15 MG tablet, Take 1 tablet by mouth 3 (Three) Times a Day., Disp: 270 tablet, Rfl: 0  •  famotidine (PEPCID) 40 MG tablet, TAKE 1 TABLET TWICE A DAY AS NEEDED FOR HEARTBURN, Disp: 60 tablet, Rfl: 11  •  FLUoxetine (PROzac) 40 MG capsule, Take 2 capsules by mouth Daily., Disp: 180 capsule, Rfl: 0  •  glucose blood (Glucose Meter Test) test strip, Use as instructed to check blood glucose levels 3 times daily, Disp: 100 each, Rfl: 5  •  glucose monitor monitoring kit, 1 each As Needed (Check blood sugars 3 times daily PRN.)., Disp: 1 each, Rfl: 0  •  Januvia 50 MG tablet, TAKE 1 TABLET DAILY, Disp: 90 tablet, Rfl: 3  •  Lancets misc, Use as instructed to test blood glucose levels 3 times daily., Disp: 100 each, Rfl: 5  •  linaclotide (LINZESS) 145 MCG capsule capsule, Take 1 capsule by mouth Daily., Disp: 90 capsule, Rfl: 3  •  losartan (COZAAR) 25 MG tablet, Take 1 tablet by mouth Daily., Disp: 90 tablet, Rfl: 1  •  meloxicam (MOBIC) 15 MG tablet, TAKE 1 TABLET DAILY, Disp: 90 tablet, Rfl: 3  •  metFORMIN (GLUCOPHAGE) 1000 MG tablet, TAKE 1 TABLET TWICE A DAY WITH MEALS, Disp: 60 tablet, Rfl: 11  •  methocarbamol (ROBAXIN) 750 MG tablet, , Disp: , Rfl:   •  metoclopramide (Reglan) 10 MG  tablet, Take 1 tablet by mouth 4 (Four) Times a Day Before Meals & at Bedtime As Needed (heartburn)., Disp: 60 tablet, Rfl: 0  •  montelukast (SINGULAIR) 10 MG tablet, Take 1 tablet by mouth Daily., Disp: 90 tablet, Rfl: 3  •  nabumetone (RELAFEN) 500 MG tablet, , Disp: , Rfl:   •  pantoprazole (PROTONIX) 40 MG EC tablet, , Disp: , Rfl:   •  prazosin (MINIPRESS) 1 MG capsule, Take 1 capsule by mouth Every Night., Disp: 90 capsule, Rfl: 0  •  propranolol (INDERAL) 20 MG tablet, Take 1/2 to 1 tablet by mouth 3 times a day as needed for anxiety., Disp: 60 tablet, Rfl: 2  •  QUEtiapine (SEROquel) 25 MG tablet, Take 1-2 tablets by mouth At Night As Needed (sleep)., Disp: 180 tablet, Rfl: 0  •  simvastatin (ZOCOR) 20 MG tablet, TAKE 1 TABLET DAILY, Disp: 90 tablet, Rfl: 3  •  sucralfate (CARAFATE) 1 g tablet, Take 4 tablets by mouth Daily., Disp: , Rfl:     Current Facility-Administered Medications:   •  triamcinolone acetonide (KENALOG-40) injection 40 mg, 40 mg, Intramuscular, Once, Amanda Katz APRN       Plan of care reviewed with the patient at the conclusion of today's visit.  Education was provided regarding diagnosis, management, and any prescribed or recommended OTC medications.  Patient verbalized understanding of and agreement with management plan.     Return if symptoms worsen or fail to improve.      BHARAT Alonzo

## 2022-03-15 ENCOUNTER — OFFICE VISIT (OUTPATIENT)
Dept: GASTROENTEROLOGY | Facility: CLINIC | Age: 57
End: 2022-03-15

## 2022-03-15 DIAGNOSIS — K21.9 GASTROESOPHAGEAL REFLUX DISEASE WITHOUT ESOPHAGITIS: ICD-10-CM

## 2022-03-15 DIAGNOSIS — K31.84 GASTROPARESIS: Primary | ICD-10-CM

## 2022-03-15 PROCEDURE — 99213 OFFICE O/P EST LOW 20 MIN: CPT | Performed by: NURSE PRACTITIONER

## 2022-03-15 NOTE — PROGRESS NOTES
GASTROENTEROLOGY OFFICE NOTE  Mallika Brewster  [id number]  1965    CARE TEAM  Patient Care Team:  Amanda Katz APRN as PCP - General (Nurse Practitioner)    Referring Provider: Amanda Katz APRN    Chief Complaint   Patient presents with   • Heartburn   • Abdominal Pain        HISTORY OF PRESENT ILLNESS:  Patient returns in follow-up with continued complaints of bloating and heartburn but with additional complaints today of abdominal pain that is like an aching, fullness type sensation that typically occurs at nighttime when she is lying down.  She is also experiencing early satiety and further states that when she feels like she is hungry it just takes a few bites are a little more to fill her up but she knows she is hungry and wants to eat.    PAST MEDICAL HISTORY  Past Medical History:   Diagnosis Date   • Acid reflux    • Arthritis    • Back pain    • Bronchitis    • Diabetes mellitus (HCC)    • High cholesterol    • Left hip pain         PAST SURGICAL HISTORY  Past Surgical History:   Procedure Laterality Date   • BILATERAL BREAST REDUCTION  12/13/2021   • CARPAL TUNNEL RELEASE Bilateral    • CHOLECYSTECTOMY     • HYSTERECTOMY     • LIPOMA EXCISION  11/05/2020    neck   • OOPHORECTOMY     • SHOULDER ROTATOR CUFF REPAIR Left         MEDICATIONS:    Current Outpatient Medications:   •  betamethasone valerate (VALISONE) 0.1 % ointment, Apply  topically to the appropriate area as directed 2 (Two) Times a Day., Disp: 45 g, Rfl: 2  •  busPIRone (BUSPAR) 15 MG tablet, Take 1 tablet by mouth 3 (Three) Times a Day., Disp: 270 tablet, Rfl: 0  •  famotidine (PEPCID) 40 MG tablet, TAKE 1 TABLET TWICE A DAY AS NEEDED FOR HEARTBURN, Disp: 60 tablet, Rfl: 11  •  FLUoxetine (PROzac) 40 MG capsule, Take 2 capsules by mouth Daily., Disp: 180 capsule, Rfl: 0  •  glucose blood (Glucose Meter Test) test strip, Use as instructed to check blood glucose levels 3 times daily, Disp: 100 each, Rfl: 5  •   glucose monitor monitoring kit, 1 each As Needed (Check blood sugars 3 times daily PRN.)., Disp: 1 each, Rfl: 0  •  Januvia 50 MG tablet, TAKE 1 TABLET DAILY, Disp: 90 tablet, Rfl: 3  •  Lancets misc, Use as instructed to test blood glucose levels 3 times daily., Disp: 100 each, Rfl: 5  •  linaclotide (LINZESS) 145 MCG capsule capsule, Take 1 capsule by mouth Daily., Disp: 90 capsule, Rfl: 3  •  losartan (COZAAR) 25 MG tablet, Take 1 tablet by mouth Daily., Disp: 90 tablet, Rfl: 1  •  meloxicam (MOBIC) 15 MG tablet, TAKE 1 TABLET DAILY, Disp: 90 tablet, Rfl: 3  •  metFORMIN (GLUCOPHAGE) 1000 MG tablet, TAKE 1 TABLET TWICE A DAY WITH MEALS, Disp: 60 tablet, Rfl: 11  •  methocarbamol (ROBAXIN) 750 MG tablet, , Disp: , Rfl:   •  metoclopramide (Reglan) 10 MG tablet, Take 1 tablet by mouth 4 (Four) Times a Day Before Meals & at Bedtime As Needed (heartburn)., Disp: 60 tablet, Rfl: 0  •  montelukast (SINGULAIR) 10 MG tablet, Take 1 tablet by mouth Daily., Disp: 90 tablet, Rfl: 3  •  pantoprazole (PROTONIX) 40 MG EC tablet, , Disp: , Rfl:   •  prazosin (MINIPRESS) 1 MG capsule, Take 1 capsule by mouth Every Night., Disp: 90 capsule, Rfl: 0  •  propranolol (INDERAL) 20 MG tablet, Take 1/2 to 1 tablet by mouth 3 times a day as needed for anxiety., Disp: 60 tablet, Rfl: 2  •  QUEtiapine (SEROquel) 25 MG tablet, Take 1-2 tablets by mouth At Night As Needed (sleep)., Disp: 180 tablet, Rfl: 0  •  simvastatin (ZOCOR) 20 MG tablet, TAKE 1 TABLET DAILY, Disp: 90 tablet, Rfl: 3  •  sucralfate (CARAFATE) 1 g tablet, Take 4 tablets by mouth Daily., Disp: , Rfl:   •  nabumetone (RELAFEN) 500 MG tablet, , Disp: , Rfl:     Current Facility-Administered Medications:   •  triamcinolone acetonide (KENALOG-40) injection 40 mg, 40 mg, Intramuscular, Once, Ojeda Amanda Orourke APRN    ALLERGIES  No Known Allergies    FAMILY HISTORY:  Family History   Adopted: Yes   Problem Relation Age of Onset   • Mental illness Other    • Schizophrenia  Son        SOCIAL HISTORY  Social History     Socioeconomic History   • Marital status:    Tobacco Use   • Smoking status: Former Smoker     Packs/day: 0.50     Years: 20.00     Pack years: 10.00     Start date: 1987     Quit date: 2018     Years since quittin.1   • Smokeless tobacco: Never Used   Vaping Use   • Vaping Use: Never used   Substance and Sexual Activity   • Alcohol use: No   • Drug use: No   • Sexual activity: Defer         PHYSICAL EXAM   There were no vitals taken for this visit.  Physical Exam  Vitals and nursing note reviewed.   Constitutional:       Appearance: Normal appearance. She is well-developed.   HENT:      Head: Normocephalic and atraumatic.      Nose: Nose normal.      Mouth/Throat:      Mouth: Mucous membranes are moist.      Pharynx: Oropharynx is clear.   Eyes:      Pupils: Pupils are equal, round, and reactive to light.   Cardiovascular:      Rate and Rhythm: Normal rate and regular rhythm.   Pulmonary:      Effort: Pulmonary effort is normal.      Breath sounds: Normal breath sounds. No wheezing or rales.   Abdominal:      General: Bowel sounds are normal.      Palpations: Abdomen is soft. There is no mass.      Tenderness: There is no abdominal tenderness. There is no guarding or rebound.      Hernia: No hernia is present.   Musculoskeletal:         General: Normal range of motion.      Cervical back: Normal range of motion and neck supple.   Skin:     General: Skin is warm and dry.   Neurological:      Mental Status: She is alert and oriented to person, place, and time.      Cranial Nerves: No cranial nerve deficit.   Psychiatric:         Behavior: Behavior normal.         Judgment: Judgment normal.           ASSESSMENT / PLAN  1.  Gastroparesis-clinically  Gastroparesis nutrition therapy discussed with patient:   -Eat small, frequent meals.    -Avoid foods that are high in fat.    -Do not eat foods high in fiber or take fiber supplements or fiber bulking  agents.  -Do not eat foods that increase acid reflux such as acidic, spicy, fried or greasy foods.    -Do not drink alcohol or smoke.    -Do not drink carbonated beverages, as they increase bloating.    -If symptoms are severe, semisolid foods or liquids may need to be your main food sources.  Choose liquid nutritional supplements that have less than or equal to 2 g of fiber per serving.    -Sit upright while eating and sit upright or walk after meals.    -Do not lie down for 3 to 4 hours after eating to avoid reflux or regurgitation.  -Educational handout provided to patient with diet tips, foods recommended, foods not recommended and a sample diet  -Refer to dietitian for further teaching    2.  GERD  -Continue pantoprazole 40 mg twice daily    Return if symptoms worsen or fail to improve.    I discussed the patients findings and my recommendations with patient    BHARAT Cruz

## 2022-03-17 DIAGNOSIS — M54.42 CHRONIC BILATERAL LOW BACK PAIN WITH BILATERAL SCIATICA: Primary | ICD-10-CM

## 2022-03-17 DIAGNOSIS — R29.898 WEAKNESS OF BOTH LOWER EXTREMITIES: ICD-10-CM

## 2022-03-17 DIAGNOSIS — M54.41 CHRONIC BILATERAL LOW BACK PAIN WITH BILATERAL SCIATICA: Primary | ICD-10-CM

## 2022-03-17 DIAGNOSIS — G89.29 CHRONIC BILATERAL LOW BACK PAIN WITH BILATERAL SCIATICA: Primary | ICD-10-CM

## 2022-03-23 ENCOUNTER — TELEPHONE (OUTPATIENT)
Dept: INTERNAL MEDICINE | Facility: CLINIC | Age: 57
End: 2022-03-23

## 2022-03-23 NOTE — TELEPHONE ENCOUNTER
PATIENT NEEDS ANOTHER LETTER FOR SERVICE DOG IN APARTMENT.  MOVING.  PATIENT WILL PICK WHEN READY.      PLEASE CALL [phone]

## 2022-03-24 NOTE — TELEPHONE ENCOUNTER
I normally don't do these but due to seeing her for so long I went ahead and wrote her a letter in her chart. She can access this through [employer] or come by office and  copy.

## 2022-04-04 ENCOUNTER — OFFICE VISIT (OUTPATIENT)
Dept: INTERNAL MEDICINE | Facility: CLINIC | Age: 57
End: 2022-04-04

## 2022-04-04 VITALS
HEART RATE: 69 BPM | RESPIRATION RATE: 16 BRPM | BODY MASS INDEX: 29.64 KG/M2 | HEIGHT: 60 IN | DIASTOLIC BLOOD PRESSURE: 84 MMHG | SYSTOLIC BLOOD PRESSURE: 126 MMHG | TEMPERATURE: 98.2 F | WEIGHT: 151 LBS | OXYGEN SATURATION: 98 %

## 2022-04-04 DIAGNOSIS — I10 ESSENTIAL HYPERTENSION: ICD-10-CM

## 2022-04-04 DIAGNOSIS — L40.9 PSORIASIS: Primary | ICD-10-CM

## 2022-04-04 DIAGNOSIS — K58.1 IRRITABLE BOWEL SYNDROME WITH CONSTIPATION: ICD-10-CM

## 2022-04-04 PROCEDURE — 99214 OFFICE O/P EST MOD 30 MIN: CPT | Performed by: NURSE PRACTITIONER

## 2022-04-04 RX ORDER — PREDNISONE 1 MG/1
TABLET ORAL
Qty: 21 TABLET | Refills: 0 | Status: SHIPPED | OUTPATIENT
Start: 2022-04-04

## 2022-04-08 ENCOUNTER — TELEPHONE (OUTPATIENT)
Dept: INTERNAL MEDICINE | Facility: CLINIC | Age: 57
End: 2022-04-08

## 2022-04-08 DIAGNOSIS — E11.9 TYPE 2 DIABETES MELLITUS WITHOUT COMPLICATION, WITHOUT LONG-TERM CURRENT USE OF INSULIN: ICD-10-CM

## 2022-04-08 RX ORDER — LANCETS 30 GAUGE
EACH MISCELLANEOUS
Qty: 100 EACH | Refills: 5 | Status: CANCELLED | OUTPATIENT
Start: 2022-04-08

## 2022-04-08 RX ORDER — LANCETS 30 GAUGE
EACH MISCELLANEOUS
Qty: 100 EACH | Refills: 5 | Status: SHIPPED | OUTPATIENT
Start: 2022-04-08

## 2022-04-08 NOTE — TELEPHONE ENCOUNTER
Caller: Ney Mallika Keith    Relationship: Self    Best call back number: [phone]    Requested Prescriptions:   Requested Prescriptions     Pending Prescriptions Disp Refills   • Lancets misc 100 each 5     Sig: Use as instructed to test blood glucose levels 3 times daily.        Additional details provided by patient: PATIENT STATES THAT SHE NEEDS A PRIOR AUTHORIZATION SENT INTO [facility].    Does the patient have less than a 3 day supply:  [x] Yes  [] No    Judith Barahona Rep   04/08/22 13:27 EDT

## 2022-04-15 ENCOUNTER — TELEPHONE (OUTPATIENT)
Dept: INTERNAL MEDICINE | Facility: CLINIC | Age: 57
End: 2022-04-15

## 2022-04-15 NOTE — TELEPHONE ENCOUNTER
PT STATES SHE NEEDS PA FOR HER LINZESS 145MG ALSO B/C SHE IS COMPLETELY OUT, I TOLD HER I'D ASK FOR HER, BUT SINCE SHE DOESN'T LIVE HERE THEY MIGHT NOT BE ABLE TO DO IT, PLEASE ADVISE

## 2022-04-18 DIAGNOSIS — J30.89 ENVIRONMENTAL AND SEASONAL ALLERGIES: ICD-10-CM

## 2022-04-18 RX ORDER — MONTELUKAST SODIUM 10 MG/1
TABLET ORAL
Qty: 90 TABLET | Refills: 3 | Status: SHIPPED | OUTPATIENT
Start: 2022-04-18

## 2022-04-21 ENCOUNTER — TELEPHONE (OUTPATIENT)
Dept: INTERNAL MEDICINE | Facility: CLINIC | Age: 57
End: 2022-04-21

## 2022-04-22 DIAGNOSIS — G47.9 SLEEPING DIFFICULTIES: ICD-10-CM

## 2022-04-25 RX ORDER — QUETIAPINE FUMARATE 25 MG/1
TABLET, FILM COATED ORAL
Qty: 180 TABLET | Refills: 3 | Status: SHIPPED | OUTPATIENT
Start: 2022-04-25

## 2022-04-25 NOTE — PROGRESS NOTES
"This provider is located at the [facility] (through [facility]), [address], [place], [address] using a secure [employer] Video Visit through [employer]. Patient is being seen remotely via telehealth at their home address in [place], and stated they are in a secure environment for this session. The patient's condition being diagnosed/treated is appropriate for telemedicine. The provider identified herself as well as her credentials. The patient, and/or patients guardian, consent to be seen remotely, and when consent is given they understand that the consent allows for patient identifiable information to be sent to a third party as needed. They may refuse to be seen remotely at any time. The electronic data is encrypted and password protected, and the patient and/or guardian has been advised of the potential risks to privacy not withstanding such measures.    You have chosen to receive care through a telehealth visit.  Do you consent to use a video/audio connection for your medical care today? Yes     Subjective   Mallika Brewster is a 57 y.o. female who is here today for medication management follow up.    Chief Complaint: Depression, anxiety, PTSD, nightmares, insomnia    History of Present Illness:  The patient describes her mood as \"okay\" over the last few weeks.  The patient endorses that overall she has been doing well over the past few months.  The patient states that she has been anxious over the past few weeks due to situational stressors.  The patient states that she is stressed about an upcoming move.  The patient states that she is going to be moving to [place] to be closer to her daughter and son.  The patient states that she is eager to move near to them, but endorses that actually getting everything moved and getting settled in is going to be stressful for her and has increased her anxiety somewhat.  The patient endorses that overall she is pleased with " management of symptoms with her current psychiatric medication regimen at this time.  The patient states that she feels that she is coping with everything well and that symptoms have continued to be well managed over the past several months.  The patient reports her appetite as good.  The patient reports her sleep as good.  The patient states that she will occasionally have a bad dream, but states that this is very infrequent and that this has continued to be well-controlled with the Prazosin.  The patient denies any new medical problems or changes in medications since last appointment with this facility.  The patient reports compliance with current medication regimen.  The patient denies any current side effects from her current medication regimen.  The patient denies any abnormal muscle movements or tics.  The patient rates her depression at a 4/10 on a 0-10 scale, with 10 being the worst.  The patient rates her anxiety at a 6-7/10 on a 0-10 scale, with 10 being the worst.  The patient rates hopelessness at a 0/10 on a 0-10 scale with 10 being the worst.  The patient would like to not adjust or change her medications at this visit.  The patient denies suicidal ideations and homicidal ideations, and is convincing.  The patient denies any auditory hallucinations or visual hallucinations.  The patient does not endorse significant symptoms consistent with bipolar disorder or a psychotic illness.                    The patient denies any chance of pregnancy at this time.  The patient was educated that her prescribed medications can have potential risk to a developing fetus. The patient is advised to contact this APRN/this office if she becomes pregnant or plans to become pregnant.  Pt verbalizes understanding and acknowledged agreement with this plan in her own words.        The following portions of the patient's history were reviewed and updated as appropriate: allergies, current medications, past family history,  past medical history, past social history, past surgical history and problem list.    Review of Systems   Constitutional: Negative for activity change, appetite change, fatigue and unexpected weight change.   Psychiatric/Behavioral: Positive for dysphoric mood. Negative for agitation, behavioral problems, confusion, decreased concentration, hallucinations, self-injury, sleep disturbance and suicidal ideas. The patient is nervous/anxious. The patient is not hyperactive.        Objective   Physical Exam  Constitutional:       Appearance: Normal appearance.   Neurological:      Mental Status: She is alert.   Psychiatric:         Attention and Perception: Attention and perception normal.         Mood and Affect: Affect normal. Mood is anxious. Mood is not depressed.         Speech: Speech normal.         Behavior: Behavior normal. Behavior is cooperative.         Thought Content: Thought content normal. Thought content does not include homicidal or suicidal ideation. Thought content does not include homicidal or suicidal plan.         Cognition and Memory: Cognition and memory normal.         Judgment: Judgment normal.       not currently breastfeeding.    The patient was seen remotely today via a MyChart Video Visit through [facility].  Unable to obtain vital signs due to nature of remote visit.  Height stated at 60 inches.  Weight stated at 151 pounds.     No Known Allergies    Recent Results (from the past 2016 hour(s))   POCT glycated hemoglobin, total    Collection Time: 03/11/22  9:15 AM    Specimen: Urine   Result Value Ref Range    Hemoglobin A1C 7.0 %       Current Medications:   Current Outpatient Medications   Medication Sig Dispense Refill   • betamethasone valerate (VALISONE) 0.1 % ointment Apply  topically to the appropriate area as directed 2 (Two) Times a Day. 45 g 2   • busPIRone (BUSPAR) 15 MG tablet Take 1 tablet by mouth 3 (Three) Times a Day. 270 tablet 0   • famotidine (PEPCID) 40 MG tablet TAKE 1 TABLET  TWICE A DAY AS NEEDED FOR HEARTBURN 60 tablet 11   • FLUoxetine (PROzac) 40 MG capsule Take 2 capsules by mouth Daily. 180 capsule 1   • glucose blood (Glucose Meter Test) test strip Use as instructed to check blood glucose levels 3 times daily 100 each 5   • glucose monitor monitoring kit 1 each As Needed (Check blood sugars 3 times daily PRN.). 1 each 0   • Januvia 50 MG tablet TAKE 1 TABLET DAILY 90 tablet 3   • Lancets misc Use as instructed to test blood glucose levels 3 times daily. 100 each 5   • linaclotide (LINZESS) 145 MCG capsule capsule Take 1 capsule by mouth Daily. 90 capsule 3   • losartan (COZAAR) 25 MG tablet Take 1 tablet by mouth Daily. 90 tablet 1   • meloxicam (MOBIC) 15 MG tablet TAKE 1 TABLET DAILY 90 tablet 3   • metFORMIN (GLUCOPHAGE) 1000 MG tablet TAKE 1 TABLET TWICE A DAY WITH MEALS 60 tablet 11   • methocarbamol (ROBAXIN) 750 MG tablet      • metoclopramide (Reglan) 10 MG tablet Take 1 tablet by mouth 4 (Four) Times a Day Before Meals & at Bedtime As Needed (heartburn). 60 tablet 0   • montelukast (SINGULAIR) 10 MG tablet TAKE 1 TABLET DAILY 90 tablet 3   • nabumetone (RELAFEN) 500 MG tablet      • pantoprazole (PROTONIX) 40 MG EC tablet      • prazosin (MINIPRESS) 1 MG capsule Take 1 capsule by mouth Every Night. 90 capsule 1   • predniSONE (DELTASONE) 5 MG tablet Take as directed on package instruction - 6 day taper. 21 tablet 0   • propranolol (INDERAL) 20 MG tablet Take 1/2 to 1 tablet by mouth 3 times a day as needed for anxiety. 60 tablet 2   • QUEtiapine (SEROquel) 25 MG tablet TAKE 1 TO 2 TABLETS AT NIGHT AS NEEDED FOR SLEEP 180 tablet 3   • simvastatin (ZOCOR) 20 MG tablet TAKE 1 TABLET DAILY 90 tablet 3   • sucralfate (CARAFATE) 1 g tablet Take 4 tablets by mouth Daily.       No current facility-administered medications for this visit.       Mental Status Exam:   Hygiene:   good  Cooperation:  Cooperative  Eye Contact:  Good  Psychomotor Behavior:  Appropriate  Affect:  Full  range  Hopelessness: Denies  Speech:  Normal  Thought Process:  Goal directed  Thought Content:  Normal  Suicidal:  None  Homicidal:  None  Hallucinations:  None  Delusion:  None  Memory:  Intact  Orientation:  Person, Place, Time and Situation  Reliability:  good  Insight:  Good  Judgement:  Good  Impulse Control:  Good  Physical/Medical Issues:  Yes See medical history        Assessment/Plan   Diagnoses and all orders for this visit:    1. Generalized anxiety disorder (Primary)  -     FLUoxetine (PROzac) 40 MG capsule; Take 2 capsules by mouth Daily.  Dispense: 180 capsule; Refill: 1    2. Panic disorder (episodic paroxysmal anxiety)  -     FLUoxetine (PROzac) 40 MG capsule; Take 2 capsules by mouth Daily.  Dispense: 180 capsule; Refill: 1    3. Post traumatic stress disorder (PTSD)  -     FLUoxetine (PROzac) 40 MG capsule; Take 2 capsules by mouth Daily.  Dispense: 180 capsule; Refill: 1  -     prazosin (MINIPRESS) 1 MG capsule; Take 1 capsule by mouth Every Night.  Dispense: 90 capsule; Refill: 1    4. Mild episode of recurrent major depressive disorder (HCC)  -     FLUoxetine (PROzac) 40 MG capsule; Take 2 capsules by mouth Daily.  Dispense: 180 capsule; Refill: 1    5. Psychophysiological insomnia  -     prazosin (MINIPRESS) 1 MG capsule; Take 1 capsule by mouth Every Night.  Dispense: 90 capsule; Refill: 1    6. Nightmares  -     prazosin (MINIPRESS) 1 MG capsule; Take 1 capsule by mouth Every Night.  Dispense: 90 capsule; Refill: 1            Visit Diagnoses:    ICD-10-CM ICD-9-CM   1. Generalized anxiety disorder  F41.1 300.02   2. Panic disorder (episodic paroxysmal anxiety)  F41.0 300.01   3. Post traumatic stress disorder (PTSD)  F43.10 309.81   4. Mild episode of recurrent major depressive disorder (HCC)  F33.0 296.31   5. Psychophysiological insomnia  F51.04 307.42   6. Nightmares  F51.5 307.47       GOALS:  Short Term Goals: Patient will be compliant with medication, and patient will have no  significant medication related side effects.  Patient will be engaged in psychotherapy as indicated.  Patient will report subjective improvement of symptoms.  Long term goals: To stabilize mood and treat/improve subjective symptoms, the patient will stay out of the hospital, the patient will be at an optimal level of functioning, and the patient will take all medications as prescribed.  The patient verbalized understanding and agreement with goals that were mutually set.      SUICIDE RISK ASSESSMENT: Unalterable demographics and a history of mental health intervention indicate this patient is in a high risk category compared to the general population. At present, the patient denies active SI/HI, intentions, or plans at this time and agrees to seek immediate care should such thoughts develop. The patient verbalizes understanding of how to access emergency care if needed and agrees to do so. Consideration of suicide risk and protective factors such as history, current presentation, individual strengths and weaknesses, psychosocial and environmental stressors and variables, psychiatric illness and symptoms, medical conditions and pain, took place in this interview. Based on those considerations, the patient is determined: within individual baseline and presenting no imminent risk for suicide or homicide. Other recommendations: The patient does not meet the criteria for inpatient admission and is not a safety risk to self or others at today's visit. Inpatient treatment offers no significant advantages over outpatient treatment for this patient at today's visit.      SAFETY PLAN:  Patient was given ample time for questions and fully participated in treatment planning.  Patient was encouraged to call the clinic with any questions or concerns.  Patient was informed of access to emergency care. If patient were to develop any significant symptomatology, suicidal ideation, homicidal ideation, any concerns, or feel unsafe at  any time they are to call the clinic and if unable to get immediate assistance should immediately call 911 or go to the nearest emergency room.  The patient is advised to remove or secure (lock away) all lethal weapons (including guns) and sharps (including razors, scissors, knives, etc.).  All medications (including any prescribed and any over the counter medications) should be stored in a safe and secured location that is not obtainable by children/adolescents.  Patient was given an opportunity and encouraged to ask questions about their medication, illness, and treatment. Patient contracted verbally for the following: If you are experiencing an emotional crisis or have thoughts of harming yourself or others, please go to your nearest local emergency room or call 911. Will continue to re-assess medication response and side effects frequently to establish efficacy and ensure safety. Risks, any black box warnings, side effects, off label usage, and benefits of medication and treatment discussed with patient, along with potential adverse side effects of current and/or newly prescribed medication, alternative treatment options, and OTC medications.  Patient verbalized understanding of potential risks, any off label use of medication, any black box warnings, and any side effects in their own words. The patient verbalized understanding and agreed to comply with the safety plan discussed in their own words.  Patient given the number to the office. Number also available to the 24- hour suicide hotline.      TREATMENT PLAN/GOALS: Continue medications and treatment plan as indicated. Treatment and medication options discussed during today's visit. Patient acknowledged and verbally consented to continue with current treatment plan and was educated on the importance of compliance with treatment and follow-up appointments.        -Continue Prozac 40 mg daily for anxiety, depression, and PTSD  -Continue Buspar 15 mg three times  daily as adjunct for anxiety and depression  -Continue Prazosin 1 mg nightly for nightmares/sleep  -Continue Seroquel 25-50 mg nightly as needed for sleep  -Continue Propanolol 10-20 mg three times daily as needed for anxiety/panic  -The patient reported that she will be moving to [place] within the next few weeks so endorses that she will be unable to follow-up with this APRN.  The patient states that she is planning to establish with a new psychiatric provider to continue managing her psychotropic medications when she relocates to [place].  No follow-up scheduled at this time, but encouraged the patient to notify this APRN if she has any questions, concerns, or side effects.  Also discussed with the patient that if she relocates back to [place] would like to continue care with this APRN to notify the clinic for a follow-up appointment.  The patient verbalizes understanding and endorses that she is agreeable to this.        MEDICATION ISSUES: Discussed medication options and treatment plan of prescribed medication, any off label use of medication, as well as the risks, benefits, any black box warnings including increased suicidality, and side effects including but not limited to potential falls, dizziness, possible impaired driving, GI side effects (change in appetite, abdominal discomfort, nausea, vomiting, diarrhea, and/or constipation), dry mouth, somnolence, sedation, insomnia, activation, agitation, irritation, tremors, abnormal muscle movements or disorders, tardive dyskinesia, akathisia, asthenia, headache, sweating, possible bruising or rare bleeding, electrolyte and/or fluid abnormalities, change in blood pressure/heart rate/and or heart rhythm, hypotension, sexual dysfunction, rare impulse control problems, rare seizures, rare neuroleptic malignant syndrome, increased risk of death and cerebrovascular events, change in blood glucose and increased risk for diabetes, change in triglycerides and  cholesterol and increased risk for dyslipidemia,  weight gain, weight gain that can become problematic to health, skin conditions and reactions, and metabolic adversities among others. Patient and/or guardian are agreeable to call the office with any worsening of symptoms or onset of side effects, or if any concerns or questions arise.  The contact information for the office is made available to the patient and/or guardian. Patient and/or guardian are agreeable to call 911 or go to the nearest ER should they begin having any SI/HI, or if any urgent concerns arise. No medication side effects or related complaints today.    This APRN has discussed with the patient/guardian about the possibility of serotonin syndrome when certain medications are taken together, as is the case with this patient.  This APRN has provided the patient/guardian with a list of symptoms of serotonin syndrome including symptoms of autonomic instability, altered sensorium, confusion, restlessness, agitation, myoclonus, hyperreflexia, hyperthermia, diaphoresis, tremor, chills, diarrhea and cramps, ataxia, headache, migraines, seizures, and insomnia; which could lead to permanent hyperthermic brain damage, cardiovascular collapse, coma, or even death.  The patient/guardian are instructed to stop medications immediately and either contact this APRN/this office during regular office hours, or go to the emergency department/call 911, if they begin to experience any of the symptoms discussed.  The benefits and risks of the current medication regimen are discussed with the patient/guardian, and they feel that the benefits out weigh the risks.  The patient/guardian verbalized understanding and agreement in their own words.                    [facility] No Show Policy:  We understand unexpected circumstances arise; however, anytime you miss your appointment we are unable to provide you appropriate care.  In addition, each  appointment missed could have been used to provide care for others.  We ask that you call at least 24 hours in advance to cancel or reschedule an appointment.  We would like to take this opportunity to remind you of our policy stating patients who miss THREE or more appointments without cancelling or rescheduling 24 hours in advance of the appointment may be subject to cancellation of any further visits with our clinic and recommendation to seek in-person services/visits.    Please call [phone] to reschedule your appointment. If there are reasons that make it difficult for you to keep the appointments, please call and let us know how we can help.  Please understand that medication prescribing will not continue without seeing your provider.      [facility]'s No Show Policy reviewed with patient at today's visit. Patient verbalized understanding of this policy. Discussed with patient that in the event that there are three or more no show visits, it will be recommended that they pursue in-person services/visits as noncompliance with telehealth visits indicates that patient is not an appropriate candidate for telemedicine and would likely be more appropriate for in-person services/visits. Patient verbalizes understanding and is agreeable to this.          MEDS ORDERED DURING VISIT:  New Medications Ordered This Visit   Medications   • FLUoxetine (PROzac) 40 MG capsule     Sig: Take 2 capsules by mouth Daily.     Dispense:  180 capsule     Refill:  1   • prazosin (MINIPRESS) 1 MG capsule     Sig: Take 1 capsule by mouth Every Night.     Dispense:  90 capsule     Refill:  1       No follow-ups on file.          Functional Status: Mild impairment     Prognosis: Good with Ongoing Treatment             This document has been electronically signed by BHARAT Partida  April 27, 2022 13:52 EDT       Some of the data in this electronic note has been brought forward from a previous encounter, any  necessary changes have been made, it has been reviewed by this APRN, and it is accurate.      Part of this note may be an electronic transcription/translation of spoken language to printed text using the Dragon Dictation System.

## 2022-04-27 ENCOUNTER — TELEMEDICINE (OUTPATIENT)
Dept: PSYCHIATRY | Facility: CLINIC | Age: 57
End: 2022-04-27

## 2022-04-27 DIAGNOSIS — F51.04 PSYCHOPHYSIOLOGICAL INSOMNIA: ICD-10-CM

## 2022-04-27 DIAGNOSIS — F51.5 NIGHTMARES: ICD-10-CM

## 2022-04-27 DIAGNOSIS — F33.0 MILD EPISODE OF RECURRENT MAJOR DEPRESSIVE DISORDER: Chronic | ICD-10-CM

## 2022-04-27 DIAGNOSIS — F41.1 GENERALIZED ANXIETY DISORDER: Primary | Chronic | ICD-10-CM

## 2022-04-27 DIAGNOSIS — F41.0 PANIC DISORDER (EPISODIC PAROXYSMAL ANXIETY): ICD-10-CM

## 2022-04-27 DIAGNOSIS — F43.10 POST TRAUMATIC STRESS DISORDER (PTSD): Chronic | ICD-10-CM

## 2022-04-27 PROCEDURE — 99214 OFFICE O/P EST MOD 30 MIN: CPT | Performed by: NURSE PRACTITIONER

## 2022-04-27 RX ORDER — FLUOXETINE HYDROCHLORIDE 40 MG/1
80 CAPSULE ORAL DAILY
Qty: 180 CAPSULE | Refills: 1 | Status: SHIPPED | OUTPATIENT
Start: 2022-04-27

## 2022-04-27 RX ORDER — PRAZOSIN HYDROCHLORIDE 1 MG/1
1 CAPSULE ORAL NIGHTLY
Qty: 90 CAPSULE | Refills: 1 | Status: SHIPPED | OUTPATIENT
Start: 2022-04-27

## 2024-01-23 ENCOUNTER — OFFICE (OUTPATIENT)
Dept: URBAN - METROPOLITAN AREA CLINIC 128 | Facility: CLINIC | Age: 59
End: 2024-01-23

## 2024-01-23 VITALS
WEIGHT: 135 LBS | HEART RATE: 89 BPM | DIASTOLIC BLOOD PRESSURE: 72 MMHG | HEIGHT: 60 IN | SYSTOLIC BLOOD PRESSURE: 100 MMHG

## 2024-01-23 DIAGNOSIS — R12 HEARTBURN: ICD-10-CM

## 2024-01-23 DIAGNOSIS — K92.1 MELENA: ICD-10-CM

## 2024-01-23 DIAGNOSIS — R14.0 ABDOMINAL BLOATING: ICD-10-CM

## 2024-01-23 PROCEDURE — 99203 OFFICE O/P NEW LOW 30 MIN: CPT | Performed by: INTERNAL MEDICINE

## 2024-01-23 RX ORDER — DEXLANSOPRAZOLE 30 MG/1
30 CAPSULE, DELAYED RELEASE ORAL
Qty: 90 | Status: ACTIVE
Start: 2024-01-23

## 2024-01-23 NOTE — SERVICENOTES
The doctor employed the use of a scribe and all parties consented to conducting the visit in this manner. Documentation assistance provided by Ashkawn Beheshtian., 30 minutes were spent in dedicated E/M time during the date of service, including preparation of the medical chart, review of previous information, data analysis/discussion, and/or discussion with the patient/family/caregiver

## 2024-01-23 NOTE — SERVICEHPINOTES
This is a   58   year old  female   seen   in consultation at the request of Dr. BAR OTERO  . She comes in due to acid reflux. She has had issues with acid reflux for many years but it has worsened over the past 6 months. Along with acid reflux she has been having abdominal pain, heartburn and gas/bloating. She was prescribed famotidine for relief but it hasn't been able to provide relief. She has tried PPI's for relief such as pantoprazole, omeprazole, lansoprazole, esomeprazole and it experienced minor relief. About a week ago she noticed she was having black loose stools similar to melena. The acid reflux interferes with her sleep at night. br
hernandez
She also has a history of IBS-C. She takes Linzess for this problem and it provides relief. hernandez

## 2024-02-09 ENCOUNTER — AMBULATORY SURGICAL CENTER (OUTPATIENT)
Dept: URBAN - METROPOLITAN AREA SURGERY 78 | Facility: SURGERY | Age: 59
End: 2024-02-09

## 2024-02-09 VITALS
TEMPERATURE: 97.1 F | HEIGHT: 60 IN | RESPIRATION RATE: 16 BRPM | SYSTOLIC BLOOD PRESSURE: 106 MMHG | HEART RATE: 79 BPM | OXYGEN SATURATION: 97 % | DIASTOLIC BLOOD PRESSURE: 71 MMHG

## 2024-02-09 DIAGNOSIS — K92.1 MELENA: ICD-10-CM

## 2024-02-09 DIAGNOSIS — R14.0 ABDOMINAL DISTENSION (GASEOUS): ICD-10-CM

## 2024-02-09 DIAGNOSIS — R12 HEARTBURN: ICD-10-CM

## 2024-02-09 DIAGNOSIS — T18.2XXA FOREIGN BODY IN STOMACH, INITIAL ENCOUNTER: ICD-10-CM

## 2024-02-09 PROCEDURE — 43239 EGD BIOPSY SINGLE/MULTIPLE: CPT | Performed by: INTERNAL MEDICINE

## 2024-02-09 NOTE — SERVICEHPINOTES
This is a 58 year old female seen in consultation at the request of Dr. BAR OTERO. She comes in due to acid reflux. She has had issues with acid reflux for many years but it has worsened over the past 6 months. Along with acid reflux she has been having abdominal pain, heartburn and gas/bloating. She was prescribed famotidine for relief but it hasn't been able to provide relief. She has tried PPI's for relief such as pantoprazole, omeprazole, lansoprazole, esomeprazole and it experienced minor relief. About a week ago she noticed she was having black loose stools similar to melena. The acid reflux interferes with her sleep at night. She also has a history of IBS-C. She takes Linzess for this problem and it provides relief.

## 2024-02-29 ENCOUNTER — OFFICE (OUTPATIENT)
Dept: URBAN - METROPOLITAN AREA CLINIC 128 | Facility: CLINIC | Age: 59
End: 2024-02-29

## 2024-02-29 VITALS
HEIGHT: 60 IN | SYSTOLIC BLOOD PRESSURE: 95 MMHG | DIASTOLIC BLOOD PRESSURE: 57 MMHG | HEART RATE: 88 BPM | WEIGHT: 135 LBS

## 2024-02-29 DIAGNOSIS — R11.0 NAUSEA: ICD-10-CM

## 2024-02-29 DIAGNOSIS — R14.0 ABDOMINAL BLOATING: ICD-10-CM

## 2024-02-29 DIAGNOSIS — T18.2XXA FOREIGN BODY IN STOMACH, INITIAL ENCOUNTER: ICD-10-CM

## 2024-02-29 DIAGNOSIS — R12 HEARTBURN: ICD-10-CM

## 2024-02-29 PROCEDURE — 99213 OFFICE O/P EST LOW 20 MIN: CPT

## 2024-02-29 NOTE — SERVICENOTES
I am following the supervising physician's established plan of care, but no physician was present at the time of the encounter. Note send to Dr. Guardado for review. , 23 minutes were spent in dedicated E/M time during the date of service, including preparation of the medical chart, review of previous information, data analysis/discussion, and/or discussion with the patient/family/caregiver

## 2024-02-29 NOTE — SERVICEHPINOTES
ALTON WATKINS   returns today for follow-up from last visit on   2/9/2024  .    Recall she is a 59 year old female previously seen for acid reflux, abdominal pain, gas/bloating, melena. She was prescribed famotidine for relief but it hasn't been able to provide relief. She has tried PPI's for relief such as pantoprazole, omeprazole, lansoprazole, esomeprazole and it experienced minor relief. 
br She is known taking the Ozempic for DM and to lose weight. She reports fasting for 8 hours, but she only stopped ozempic for a week before procedure. However, she reports having those symptoms far back before she even starts Ozempic.  She also has a history of IBS-C. She takes Linzess for this problem and it provides relief.
br
brShe reports no more black stool. Patient denies fever, nausea, vomiting, dysphagia, change in bowel habits, diarrhea, rectal bleeding, melena, and significant change in weight. Denies shortness of breath and chest pain.  
br
br

## 2024-12-30 NOTE — PROGRESS NOTES
My chart message:    Your hand xray is normal. Please let me know if you have any questions.   pt's parent will self schedule follow up 12/30 - DK (PCP Referral)